# Patient Record
Sex: FEMALE | Race: BLACK OR AFRICAN AMERICAN | Employment: OTHER | URBAN - METROPOLITAN AREA
[De-identification: names, ages, dates, MRNs, and addresses within clinical notes are randomized per-mention and may not be internally consistent; named-entity substitution may affect disease eponyms.]

---

## 2017-05-04 ENCOUNTER — OFFICE VISIT (OUTPATIENT)
Dept: ORTHOPEDIC SURGERY | Age: 77
End: 2017-05-04

## 2017-05-04 VITALS — HEIGHT: 63 IN | TEMPERATURE: 99.2 F | WEIGHT: 112 LBS | BODY MASS INDEX: 19.84 KG/M2

## 2017-05-04 DIAGNOSIS — M80.08XA AGE-RELATED OSTEOPOROSIS WITH CURRENT PATHOL FRACTURE OF VERTEBRA, INITIAL ENCOUNTER (HCC): Primary | ICD-10-CM

## 2017-05-04 PROCEDURE — 99203 OFFICE O/P NEW LOW 30 MIN: CPT | Performed by: ORTHOPAEDIC SURGERY

## 2017-05-04 RX ORDER — HYDROCODONE BITARTRATE AND ACETAMINOPHEN 5; 325 MG/1; MG/1
1 TABLET ORAL EVERY 6 HOURS PRN
Qty: 40 TABLET | Refills: 0 | Status: SHIPPED | OUTPATIENT
Start: 2017-05-04 | End: 2017-06-03

## 2017-05-04 RX ORDER — TIZANIDINE 4 MG/1
4 TABLET ORAL EVERY 6 HOURS PRN
Qty: 90 TABLET | Refills: 0 | Status: SHIPPED | OUTPATIENT
Start: 2017-05-04 | End: 2017-06-03

## 2017-06-12 DIAGNOSIS — M80.08XA AGE-RELATED OSTEOPOROSIS WITH CURRENT PATHOL FRACTURE OF VERTEBRA, INITIAL ENCOUNTER (HCC): Primary | ICD-10-CM

## 2017-06-13 RX ORDER — HYDROCODONE BITARTRATE AND ACETAMINOPHEN 5; 325 MG/1; MG/1
TABLET ORAL
Qty: 40 TABLET | Refills: 0 | Status: SHIPPED | OUTPATIENT
Start: 2017-06-13 | End: 2017-09-17

## 2017-07-12 ENCOUNTER — TELEPHONE (OUTPATIENT)
Dept: ORTHOPEDIC SURGERY | Age: 77
End: 2017-07-12

## 2017-08-09 ENCOUNTER — OFFICE VISIT (OUTPATIENT)
Dept: ORTHOPEDIC SURGERY | Age: 77
End: 2017-08-09

## 2017-08-09 VITALS — BODY MASS INDEX: 20.55 KG/M2 | WEIGHT: 116 LBS | TEMPERATURE: 98.4 F | HEIGHT: 63 IN

## 2017-08-09 DIAGNOSIS — M54.40 ACUTE RIGHT-SIDED LOW BACK PAIN WITH SCIATICA, SCIATICA LATERALITY UNSPECIFIED: Primary | ICD-10-CM

## 2017-08-09 PROCEDURE — 99213 OFFICE O/P EST LOW 20 MIN: CPT | Performed by: ORTHOPAEDIC SURGERY

## 2017-08-09 PROCEDURE — L0625 LO FLEX L1-BELOW L5 PRE OTS: HCPCS | Performed by: ORTHOPAEDIC SURGERY

## 2017-08-09 RX ORDER — TIZANIDINE 4 MG/1
4 TABLET ORAL EVERY 6 HOURS PRN
Qty: 90 TABLET | Refills: 0 | Status: SHIPPED | OUTPATIENT
Start: 2017-08-09 | End: 2017-09-08

## 2017-10-17 ENCOUNTER — OFFICE VISIT (OUTPATIENT)
Dept: PRIMARY CARE CLINIC | Age: 77
End: 2017-10-17

## 2017-10-17 VITALS
DIASTOLIC BLOOD PRESSURE: 80 MMHG | TEMPERATURE: 98.3 F | SYSTOLIC BLOOD PRESSURE: 161 MMHG | HEIGHT: 63 IN | HEART RATE: 80 BPM | BODY MASS INDEX: 21.44 KG/M2 | RESPIRATION RATE: 21 BRPM | OXYGEN SATURATION: 99 % | WEIGHT: 121 LBS

## 2017-10-17 DIAGNOSIS — E55.9 VITAMIN D DEFICIENCY: ICD-10-CM

## 2017-10-17 DIAGNOSIS — M81.0 OSTEOPOROSIS, UNSPECIFIED OSTEOPOROSIS TYPE, UNSPECIFIED PATHOLOGICAL FRACTURE PRESENCE: ICD-10-CM

## 2017-10-17 DIAGNOSIS — I69.30 HISTORY OF CVA WITH RESIDUAL DEFICIT: ICD-10-CM

## 2017-10-17 DIAGNOSIS — Z23 NEED FOR PROPHYLACTIC VACCINATION AGAINST STREPTOCOCCUS PNEUMONIAE (PNEUMOCOCCUS): ICD-10-CM

## 2017-10-17 DIAGNOSIS — Z23 NEED FOR PROPHYLACTIC VACCINATION AND INOCULATION AGAINST VARICELLA: ICD-10-CM

## 2017-10-17 DIAGNOSIS — F32.9 REACTIVE DEPRESSION: ICD-10-CM

## 2017-10-17 DIAGNOSIS — E78.00 PURE HYPERCHOLESTEROLEMIA: ICD-10-CM

## 2017-10-17 DIAGNOSIS — N39.46 MIXED STRESS AND URGE URINARY INCONTINENCE: ICD-10-CM

## 2017-10-17 DIAGNOSIS — I10 ESSENTIAL HYPERTENSION: Primary | ICD-10-CM

## 2017-10-17 DIAGNOSIS — D64.9 ANEMIA, UNSPECIFIED TYPE: ICD-10-CM

## 2017-10-17 DIAGNOSIS — Z72.0 TOBACCO ABUSE: ICD-10-CM

## 2017-10-17 DIAGNOSIS — Z23 NEED FOR INFLUENZA VACCINATION: ICD-10-CM

## 2017-10-17 DIAGNOSIS — K21.9 GASTROESOPHAGEAL REFLUX DISEASE WITHOUT ESOPHAGITIS: ICD-10-CM

## 2017-10-17 PROCEDURE — 90670 PCV13 VACCINE IM: CPT | Performed by: FAMILY MEDICINE

## 2017-10-17 PROCEDURE — 99204 OFFICE O/P NEW MOD 45 MIN: CPT | Performed by: FAMILY MEDICINE

## 2017-10-17 PROCEDURE — G0008 ADMIN INFLUENZA VIRUS VAC: HCPCS | Performed by: FAMILY MEDICINE

## 2017-10-17 PROCEDURE — G0009 ADMIN PNEUMOCOCCAL VACCINE: HCPCS | Performed by: FAMILY MEDICINE

## 2017-10-17 PROCEDURE — 90662 IIV NO PRSV INCREASED AG IM: CPT | Performed by: FAMILY MEDICINE

## 2017-10-17 RX ORDER — PANTOPRAZOLE SODIUM 40 MG/1
40 TABLET, DELAYED RELEASE ORAL
Qty: 90 TABLET | Refills: 0 | Status: ON HOLD | OUTPATIENT
Start: 2017-10-17 | End: 2019-04-24

## 2017-10-17 RX ORDER — LISINOPRIL 10 MG/1
10 TABLET ORAL DAILY
Qty: 90 TABLET | Refills: 0 | Status: SHIPPED | OUTPATIENT
Start: 2017-10-17 | End: 2017-12-04 | Stop reason: SDUPTHER

## 2017-10-17 RX ORDER — OXYBUTYNIN CHLORIDE 5 MG/1
5 TABLET ORAL 3 TIMES DAILY
Qty: 270 TABLET | Refills: 0 | Status: SHIPPED | OUTPATIENT
Start: 2017-10-17 | End: 2018-11-01 | Stop reason: ALTCHOICE

## 2017-10-17 RX ORDER — SIMVASTATIN 40 MG
40 TABLET ORAL NIGHTLY
Qty: 90 TABLET | Refills: 0 | Status: ON HOLD | OUTPATIENT
Start: 2017-10-17 | End: 2019-04-24

## 2017-10-17 RX ORDER — AMLODIPINE BESYLATE 10 MG/1
10 TABLET ORAL DAILY
Qty: 90 TABLET | Refills: 0 | Status: SHIPPED | OUTPATIENT
Start: 2017-10-17 | End: 2017-12-04 | Stop reason: SDUPTHER

## 2017-10-17 ASSESSMENT — PATIENT HEALTH QUESTIONNAIRE - PHQ9
2. FEELING DOWN, DEPRESSED OR HOPELESS: 1
SUM OF ALL RESPONSES TO PHQ QUESTIONS 1-9: 1
SUM OF ALL RESPONSES TO PHQ9 QUESTIONS 1 & 2: 1
1. LITTLE INTEREST OR PLEASURE IN DOING THINGS: 0

## 2017-10-17 NOTE — PATIENT INSTRUCTIONS
memory loss, see your doctor soon. He or she can do a physical exam and ask questions about recent and past illnesses and life events. Think about bringing someone to your doctor's appointment to take notes for you. Your doctor may suggest a series of tests to measure memory changes over time. These tests give the doctor an overall picture of how well your brain is working. The doctor can use the results to decide the best treatment program and help make your life safer and easier. It is important to know that memory loss can be caused by things other than dementia. These things can include health problems such as depression, a low amount of thyroid hormone, and some infections. When those things are treated, your ability to remember can get better. Follow-up care is a key part of your treatment and safety. Be sure to make and go to all appointments, and call your doctor if you are having problems. It's also a good idea to know your test results and keep a list of the medicines you take. Where can you learn more? Go to https://Microvi Biotechnologiesdawn.Narrable. org and sign in to your [employer] account. Enter [id number] in the VeriShow box to learn more about \"Learning About the Symptoms of Dementia. \"     If you do not have an account, please click on the \"Sign Up Now\" link. Current as of: May 3, 2017  Content Version: 11.3  © 8574-0423 [employer], Incorporated. Care instructions adapted under license by [facility] [place] ([facility]). If you have questions about a medical condition or this instruction, always ask your healthcare professional. [place] [address] any warranty or liability for your use of this information. Patient Education        Helping a Person With Alzheimer's Disease: Care Instructions  Your Care Instructions  Alzheimer's disease is a type of dementia. It affects memory, intelligence, judgment, language, and behavior. It is not clear what causes this disease.  But it is the most common form of dementia in older adults. It may take many years to develop. Alzheimer's disease is different than mild memory loss that occurs with aging. Family members usually notice symptoms first. But the person also may realize that something is wrong. Follow-up care is a key part of your loved one's treatment and safety. Be sure to make and go to all appointments, and call your doctor if your loved one is having problems. It's also a good idea to know your loved one's test results and keep a list of the medicines he or she takes. How can you care for your loved one at home? · Develop a routine. The person will feel less frustrated or confused with a clear, simple daily plan. Remind him or her about important facts and events. · Be patient. It may take longer for the person to complete a task than it used to. · Help the person eat a balanced diet. Serve plenty of whole grains, fruits, and vegetables every day. If the person is not eating well at mealtimes, give snacks at midmorning and in the afternoon. Offer drinks such as Boost, Ensure, or Sustacal if he or she is losing weight. · Encourage exercise. Walking and other activity may slow the decline of mental ability. Help the person keep an active mind. Encourage hobbies such as reading and crossword puzzles. · Take steps to help if the person is sundowning. This is the restless behavior and trouble with sleeping that may occur in late afternoon and at night. Try not to let the person nap during the day. Offer a glass of warm milk or caffeine-free tea before bedtime. · Ask family members and friends for help. You may need breaks where others can help care for the person. · Talk to the person's doctor about what resources are available for help in your area. · Review all of the person's medicines with his or her doctor. · For as long as the person is able, allow him or her to make decisions about activities, food, clothing, and other choices.  Let the person be independent, even if tasks take more time or are not done perfectly. Tailor tasks to the person's abilities. For example, if cooking is no longer safe, ask for other help. He or she can help set the table or make simple dishes such as a salad. When the person needs help, offer it gently. Keeping safe  · Make your home (or the person's home) safe. Tack down rugs, and put no-slip tape in the tub. Install handrails, and put safety switches on stoves and appliances. Keep rooms free of clutter. Make sure walkways around furniture are clear. Do not move furniture around, because the person may become confused. · Use locks on doors and cupboards. Lock up knives, scissors, medicines, cleaning supplies, and other dangerous things. · Do not let the person drive or cook if he or she cannot do it safely. A person with Alzheimer's should not drive unless he or she is able to pass an on-road driving test. Your state 's license bureau can do a driving test if there is any question. · Get medical alert jewel for the person so you can be contacted if he or she wanders away. If possible, provide a safe place for wandering, such as an enclosed yard or garden. When should you call for help? Call 911 anytime you think you may need emergency care. For example, call if:  · A person who has Alzheimer's disease has disappeared. · A person who has Alzheimer's disease is seriously injured. Call your doctor now or seek immediate medical care if:  · The person you are caring for suddenly sees or hears things that are not there (hallucinates). · The person you are caring for has a sudden, drastic change in his or her behavior. Watch closely for changes in your loved one's health, and be sure to contact the doctor if:  · A person who has Alzheimer's disease gradually gets worse or has symptoms that could cause injury. · You need help caring for a person with Alzheimer's disease.   · The person has problems with his or her medicine. Where can you learn more? Go to https://chpepiceweb.Nolio. org and sign in to your SpaceCur[employer] account. Enter C174 in the Really Cheap Geeks box to learn more about \"Helping a Person With Alzheimer's Disease: Care Instructions. \"     If you do not have an account, please click on the \"Sign Up Now\" link. Current as of: July 26, 2016  Content Version: 11.3  © 0042-3512 [employer]mo, Incorporated. Care instructions adapted under license by [facility] ([facility]). If you have questions about a medical condition or this instruction, always ask your healthcare professional. [address] any warranty or liability for your use of this information.

## 2017-10-17 NOTE — PROGRESS NOTES
Vaccine Information Sheet, \"Influenza - Inactivated\"  given to Jocelin Toscano, or parent/legal guardian of  Jocelin Toscano and verbalized understanding. Patient responses:    Have you ever had a reaction to a flu vaccine? No  Are you able to eat eggs without adverse effects? Yes  Do you have any current illness? No  Have you ever had [place] Syndrome? No    Flu vaccine given per order. Please see immunization tab.

## 2017-12-01 ENCOUNTER — TELEPHONE (OUTPATIENT)
Dept: PRIMARY CARE CLINIC | Age: 77
End: 2017-12-01

## 2017-12-04 DIAGNOSIS — I10 ESSENTIAL HYPERTENSION: ICD-10-CM

## 2017-12-04 DIAGNOSIS — M19.90 ARTHRITIS: Primary | ICD-10-CM

## 2017-12-04 RX ORDER — LISINOPRIL 10 MG/1
10 TABLET ORAL DAILY
Qty: 30 TABLET | Refills: 0 | Status: ON HOLD | OUTPATIENT
Start: 2017-12-04 | End: 2018-11-03 | Stop reason: HOSPADM

## 2017-12-04 RX ORDER — AMLODIPINE BESYLATE 10 MG/1
10 TABLET ORAL DAILY
Qty: 30 TABLET | Refills: 0 | Status: SHIPPED | OUTPATIENT
Start: 2017-12-04 | End: 2018-01-01 | Stop reason: SDUPTHER

## 2017-12-04 RX ORDER — TRAMADOL HYDROCHLORIDE 50 MG/1
50 TABLET ORAL 2 TIMES DAILY
Qty: 60 TABLET | Refills: 0 | Status: ON HOLD | OUTPATIENT
Start: 2017-12-04 | End: 2018-11-03

## 2017-12-26 DIAGNOSIS — M19.90 ARTHRITIS: ICD-10-CM

## 2017-12-26 RX ORDER — TRAMADOL HYDROCHLORIDE 50 MG/1
50 TABLET ORAL 2 TIMES DAILY
Qty: 60 TABLET | Refills: 0 | OUTPATIENT
Start: 2017-12-26

## 2017-12-26 RX ORDER — TRAMADOL HYDROCHLORIDE 50 MG/1
50 TABLET ORAL 2 TIMES DAILY
Qty: 60 TABLET | Refills: 0 | Status: CANCELLED | OUTPATIENT
Start: 2017-12-26

## 2017-12-29 ENCOUNTER — TELEPHONE (OUTPATIENT)
Dept: PRIMARY CARE CLINIC | Age: 77
End: 2017-12-29

## 2018-01-01 DIAGNOSIS — I10 ESSENTIAL HYPERTENSION: ICD-10-CM

## 2018-01-02 RX ORDER — AMLODIPINE BESYLATE 10 MG/1
TABLET ORAL
Qty: 30 TABLET | Refills: 0 | Status: SHIPPED | OUTPATIENT
Start: 2018-01-02 | End: 2018-01-29 | Stop reason: SDUPTHER

## 2018-01-29 DIAGNOSIS — I10 ESSENTIAL HYPERTENSION: ICD-10-CM

## 2018-01-30 RX ORDER — AMLODIPINE BESYLATE 10 MG/1
TABLET ORAL
Qty: 30 TABLET | Refills: 0 | Status: ON HOLD | OUTPATIENT
Start: 2018-01-30 | End: 2019-04-25 | Stop reason: HOSPADM

## 2018-09-29 ENCOUNTER — HOSPITAL ENCOUNTER (EMERGENCY)
Age: 78
Discharge: HOME | End: 2018-09-29
Attending: EMERGENCY MEDICINE
Payer: MEDICARE

## 2018-09-29 VITALS
TEMPERATURE: 97.6 F | BODY MASS INDEX: 22.19 KG/M2 | OXYGEN SATURATION: 98 % | HEIGHT: 63 IN | SYSTOLIC BLOOD PRESSURE: 105 MMHG | DIASTOLIC BLOOD PRESSURE: 79 MMHG | WEIGHT: 125.22 LBS | RESPIRATION RATE: 16 BRPM | HEART RATE: 90 BPM

## 2018-09-29 DIAGNOSIS — N30.01 ACUTE CYSTITIS WITH HEMATURIA: Primary | ICD-10-CM

## 2018-09-29 LAB
BACTERIA: ABNORMAL /HPF
BILIRUBIN URINE: NEGATIVE
BLOOD, URINE: ABNORMAL
CLARITY: ABNORMAL
COLOR: YELLOW
EPITHELIAL CELLS, UA: 0 /HPF (ref 0–5)
GLUCOSE URINE: NEGATIVE MG/DL
HYALINE CASTS: 0 /LPF (ref 0–8)
KETONES, URINE: NEGATIVE MG/DL
LEUKOCYTE ESTERASE, URINE: NEGATIVE
MICROSCOPIC EXAMINATION: YES
NITRITE, URINE: NEGATIVE
PH UA: 8.5
PROTEIN UA: 100 MG/DL
RBC UA: 5 /HPF (ref 0–4)
SPECIFIC GRAVITY UA: 1.01
URINE REFLEX TO CULTURE: ABNORMAL
URINE TYPE: ABNORMAL
UROBILINOGEN, URINE: 0.2 E.U./DL
WBC UA: 1 /HPF (ref 0–5)

## 2018-09-29 PROCEDURE — 99284 EMERGENCY DEPT VISIT MOD MDM: CPT

## 2018-09-29 PROCEDURE — 6370000000 HC RX 637 (ALT 250 FOR IP): Performed by: EMERGENCY MEDICINE

## 2018-09-29 PROCEDURE — 81001 URINALYSIS AUTO W/SCOPE: CPT

## 2018-09-29 RX ORDER — CIPROFLOXACIN 500 MG/1
250 TABLET, FILM COATED ORAL ONCE
Status: COMPLETED | OUTPATIENT
Start: 2018-09-29 | End: 2018-09-29

## 2018-09-29 RX ORDER — TRAMADOL HYDROCHLORIDE 50 MG/1
50 TABLET ORAL ONCE
Status: COMPLETED | OUTPATIENT
Start: 2018-09-29 | End: 2018-09-29

## 2018-09-29 RX ORDER — ACETAMINOPHEN 325 MG/1
650 TABLET ORAL ONCE
Status: COMPLETED | OUTPATIENT
Start: 2018-09-29 | End: 2018-09-29

## 2018-09-29 RX ORDER — CIPROFLOXACIN 250 MG/1
250 TABLET, FILM COATED ORAL 2 TIMES DAILY
Qty: 6 TABLET | Refills: 0 | Status: SHIPPED | OUTPATIENT
Start: 2018-09-29 | End: 2018-10-02

## 2018-09-29 RX ORDER — TIZANIDINE 4 MG/1
4 TABLET ORAL ONCE
Status: COMPLETED | OUTPATIENT
Start: 2018-09-29 | End: 2018-09-29

## 2018-09-29 RX ORDER — CYCLOBENZAPRINE HCL 10 MG
10 TABLET ORAL 3 TIMES DAILY PRN
Qty: 15 TABLET | Refills: 0 | Status: SHIPPED | OUTPATIENT
Start: 2018-09-29 | End: 2021-01-19

## 2018-09-29 RX ADMIN — ACETAMINOPHEN 650 MG: 325 TABLET ORAL at 07:20

## 2018-09-29 RX ADMIN — CIPROFLOXACIN 250 MG: 500 TABLET, FILM COATED ORAL at 07:20

## 2018-09-29 RX ADMIN — TRAMADOL HYDROCHLORIDE 50 MG: 50 TABLET, FILM COATED ORAL at 08:05

## 2018-09-29 RX ADMIN — TIZANIDINE 4 MG: 4 TABLET ORAL at 09:45

## 2018-09-29 ASSESSMENT — PAIN - FUNCTIONAL ASSESSMENT: PAIN_FUNCTIONAL_ASSESSMENT: 0-10

## 2018-09-29 ASSESSMENT — PAIN SCALES - GENERAL
PAINLEVEL_OUTOF10: 7
PAINLEVEL_OUTOF10: 10
PAINLEVEL_OUTOF10: 8
PAINLEVEL_OUTOF10: 10
PAINLEVEL_OUTOF10: 6

## 2018-09-29 NOTE — ED NOTES
Pt assisted into wc and taken out to her granddaughter's car/once in the passenger seat she c/o intense right leg spasming/states she cannot go home like this/she was placed back in wc and brought back to room 10/spoke with Dr. Eneida Tucker and it was determined that zanaflex would be given here and if it improves her sx rxd to help with pain/pt and granddaughter made aware of plan     Madison Ho RN  09/29/18 5910

## 2018-09-29 NOTE — ED NOTES
Called pharmacy regarding Zanaflex/[facility]arm[facility]OL[facility] will send.      Luz Witt RN  09/29/18 1400

## 2018-09-29 NOTE — ED NOTES
Pt yelling out \"help me help me\". RN at bedside pt states \"I need something for pain\" MD made aware.       Abhilash Bruno, [place]  09/29/18 6203

## 2018-09-29 NOTE — ED PROVIDER NOTES
HYSTERECTOMY, TOTAL ABDOMINAL  1970's    JACQUELYN/BSO for 'tumor'       ALLERGIES     Pcn [penicillins]    FAMILY HISTORY     History reviewed. No pertinent family history. SOCIAL HISTORY       Social History     Social History    Marital status:      Spouse name: N/A    Number of children: N/A    Years of education: N/A     Social History Main Topics    Smoking status: Current Every Day Smoker     Packs/day: 0.25     Years: 50.00     Types: Cigarettes    Smokeless tobacco: Never Used    Alcohol use No    Drug use: No    Sexual activity: No     Other Topics Concern    None     Social History Narrative    None       PHYSICAL EXAM    (up to 7 for level 4, 8 or more for level 5)     Physical Exam   Constitutional: She is oriented to person, place, and time. She appears well-developed and well-nourished. No distress. Non-toxic appearing. HENT:   Head: Normocephalic and atraumatic. Mouth/Throat: Oropharynx is clear and moist.   Eyes: Conjunctivae and EOM are normal. Pupils are equal, round, and reactive to light. Right eye exhibits no discharge. Left eye exhibits no discharge. No scleral icterus. Neck: No JVD present. No tracheal deviation present. Cardiovascular: Normal rate, regular rhythm, normal heart sounds and intact distal pulses. Exam reveals no gallop and no friction rub. No murmur heard. Pulmonary/Chest: Effort normal and breath sounds normal. No stridor. No respiratory distress. She has no wheezes. She has no rales. Abdominal: Soft. Bowel sounds are normal. She exhibits no distension and no mass. There is no tenderness. There is no rebound and no guarding. No organomegaly. No CVAT. Musculoskeletal: She exhibits no edema. No neck/back midline ttp or stepoff. Stable pelvis. 5/5 strength in all 4 ext. No spasms, fasciculations, shaking, or tremors seen. No bony deformity of lower extremities. No palpable cords or ttp in calves. 2+ DP/PT pulses b/l. No foot drop.  Pt moves all toes and distal cap refill and light touch sensation grossly intact. Pt can bear wt bilaterally. Neurological: She is alert and oriented to person, place, and time. She exhibits normal muscle tone. Coordination normal.   Skin: No rash noted. She is not diaphoretic. No erythema. No pallor. No ecchymosis. PROCEDURES   Procedures    DIAGNOSTIC RESULTS   DIAGNOSTICS:   Labs:  Results for Jesse Torres (MRN [id number]) as of 10/20/2018 13:42   Ref. Range 9/29/2018 06:00   Color, UA Latest Ref Range: Straw/Yellow  YELLOW   Clarity, UA Latest Ref Range: Clear  CLOUDY (A)   Glucose, UA Latest Ref Range: Negative mg/dL Negative   Bilirubin, Urine Latest Ref Range: Negative  Negative   Ketones, Urine Latest Ref Range: Negative mg/dL Negative   Specific Gravity, UA Latest Ref Range: 1.005 - 1.030  1.010   Blood, Urine Latest Ref Range: Negative  SMALL (A)   pH, UA Latest Ref Range: 5.0 - 8.0  8.5 (A)   Protein, UA Latest Ref Range: Negative mg/dL 100 (A)   Urobilinogen, Urine Latest Ref Range: <2.0 E.U./dL 0.2   Nitrite, Urine Latest Ref Range: Negative  Negative   Leukocyte Esterase, Urine Latest Ref Range: Negative  Negative   Urine Type Unknown Not Specified   Urine Reflex to Culture Unknown Not Indicated   Hyaline Casts, UA Latest Ref Range: 0 - 8 /LPF 0   WBC, UA Latest Ref Range: 0 - 5 /HPF 1   RBC, UA Latest Ref Range: 0 - 4 /HPF 5 (H)   Epi Cells Latest Ref Range: 0 - 5 /HPF 0   Bacteria, UA Latest Units: /HPF 1+ (A)   Microscopic Examination Unknown YES       Imaging (radiologist interpretation):  No orders to display       EMERGENCY DEPARTMENT COURSE and DIFFERENTIAL DIAGNOSIS/MDM:   EMERGENCY DEPARTMENT COURSE AND TREATMENT:  Patient's condition improved  during Emergency Department evaluation.           EMERGENCY DEPARTMENT MEDICAL DECISION MAKING:  After obtaining the patient's history, performing the physical exam and reviewing the diagnostics, multiple  initial diagnoses were considered based on the presenting

## 2018-10-18 ASSESSMENT — ENCOUNTER SYMPTOMS
DIARRHEA: 0
CONSTIPATION: 0
ABDOMINAL PAIN: 0
BACK PAIN: 0
NAUSEA: 0
VOMITING: 0

## 2018-10-20 ASSESSMENT — ENCOUNTER SYMPTOMS
SHORTNESS OF BREATH: 0
COUGH: 0

## 2018-11-01 ENCOUNTER — APPOINTMENT (OUTPATIENT)
Dept: GENERAL RADIOLOGY | Age: 78
DRG: 183 | End: 2018-11-01
Payer: MEDICARE

## 2018-11-01 ENCOUNTER — HOSPITAL ENCOUNTER (INPATIENT)
Age: 78
LOS: 4 days | Discharge: OTHER INSTITUTION | DRG: 183 | End: 2018-11-05
Attending: EMERGENCY MEDICINE | Admitting: INTERNAL MEDICINE
Payer: MEDICARE

## 2018-11-01 ENCOUNTER — APPOINTMENT (OUTPATIENT)
Dept: CT IMAGING | Age: 78
DRG: 183 | End: 2018-11-01
Payer: MEDICARE

## 2018-11-01 DIAGNOSIS — S09.90XA CLOSED HEAD INJURY, INITIAL ENCOUNTER: ICD-10-CM

## 2018-11-01 DIAGNOSIS — M19.90 ARTHRITIS: ICD-10-CM
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
## 2018-11-01 DIAGNOSIS — S22.42XA CLOSED FRACTURE OF MULTIPLE RIBS OF LEFT SIDE, INITIAL ENCOUNTER: ICD-10-CM

## 2018-11-01 PROBLEM — E78.5 HYPERLIPIDEMIA: Status: ACTIVE | Noted: 2018-11-01

## 2018-11-01 PROBLEM — R26.2 IMPAIRED AMBULATION: Status: ACTIVE | Noted: 2018-11-01

## 2018-11-01 PROBLEM — R53.1 GENERALIZED WEAKNESS: Status: ACTIVE | Noted: 2018-11-01

## 2018-11-01 PROBLEM — S22.49XA MULTIPLE CLOSED FRACTURES OF RIBS: Status: ACTIVE | Noted: 2018-11-01

## 2018-11-01 PROBLEM — I10 ESSENTIAL HYPERTENSION, BENIGN: Status: ACTIVE | Noted: 2018-11-01

## 2018-11-01 LAB
A/G RATIO: 0.9 (ref 1.1–2.2)
ALBUMIN SERPL-MCNC: 3.8 G/DL (ref 3.4–5)
ALP BLD-CCNC: 127 U/L (ref 40–129)
ALT SERPL-CCNC: 7 U/L (ref 10–40)
ANION GAP SERPL CALCULATED.3IONS-SCNC: 14 MMOL/L (ref 3–16)
AST SERPL-CCNC: 17 U/L (ref 15–37)
BASOPHILS ABSOLUTE: 0.1 K/UL (ref 0–0.2)
BASOPHILS RELATIVE PERCENT: 0.9 %
BILIRUB SERPL-MCNC: <0.2 MG/DL (ref 0–1)
BUN BLDV-MCNC: 16 MG/DL (ref 7–20)
CALCIUM SERPL-MCNC: 9.9 MG/DL (ref 8.3–10.6)
CHLORIDE BLD-SCNC: 100 MMOL/L (ref 99–110)
CO2: 23 MMOL/L (ref 21–32)
CREAT SERPL-MCNC: 0.8 MG/DL (ref 0.6–1.2)
EOSINOPHILS ABSOLUTE: 0.1 K/UL (ref 0–0.6)
EOSINOPHILS RELATIVE PERCENT: 2.3 %
GFR AFRICAN AMERICAN: >60
GFR NON-AFRICAN AMERICAN: >60
GLOBULIN: 4.2 G/DL
GLUCOSE BLD-MCNC: 102 MG/DL (ref 70–99)
HCT VFR BLD CALC: 44.5 % (ref 36–48)
HEMOGLOBIN: 14.3 G/DL (ref 12–16)
LYMPHOCYTES ABSOLUTE: 1 K/UL (ref 1–5.1)
LYMPHOCYTES RELATIVE PERCENT: 16.7 %
MCH RBC QN AUTO: 26.4 PG (ref 26–34)
MCHC RBC AUTO-ENTMCNC: 32.1 G/DL (ref 31–36)
MCV RBC AUTO: 82.2 FL (ref 80–100)
MONOCYTES ABSOLUTE: 0.5 K/UL (ref 0–1.3)
MONOCYTES RELATIVE PERCENT: 8.7 %
NEUTROPHILS ABSOLUTE: 4.1 K/UL (ref 1.7–7.7)
NEUTROPHILS RELATIVE PERCENT: 71.4 %
PDW BLD-RTO: 15.5 % (ref 12.4–15.4)
PLATELET # BLD: 409 K/UL (ref 135–450)
PMV BLD AUTO: 6.5 FL (ref 5–10.5)
POTASSIUM REFLEX MAGNESIUM: 4.1 MMOL/L (ref 3.5–5.1)
RBC # BLD: 5.42 M/UL (ref 4–5.2)
SODIUM BLD-SCNC: 137 MMOL/L (ref 136–145)
TOTAL PROTEIN: 8 G/DL (ref 6.4–8.2)
TROPONIN: <0.01 NG/ML
WBC # BLD: 5.8 K/UL (ref 4–11)

## 2018-11-01 PROCEDURE — 6370000000 HC RX 637 (ALT 250 FOR IP): Performed by: EMERGENCY MEDICINE

## 2018-11-01 PROCEDURE — 94664 DEMO&/EVAL PT USE INHALER: CPT

## 2018-11-01 PROCEDURE — 99285 EMERGENCY DEPT VISIT HI MDM: CPT

## 2018-11-01 PROCEDURE — 71250 CT THORAX DX C-: CPT

## 2018-11-01 PROCEDURE — 80053 COMPREHEN METABOLIC PANEL: CPT

## 2018-11-01 PROCEDURE — 70450 CT HEAD/BRAIN W/O DYE: CPT

## 2018-11-01 PROCEDURE — 93010 ELECTROCARDIOGRAM REPORT: CPT | Performed by: INTERNAL MEDICINE

## 2018-11-01 PROCEDURE — 93005 ELECTROCARDIOGRAM TRACING: CPT | Performed by: EMERGENCY MEDICINE

## 2018-11-01 PROCEDURE — 2580000003 HC RX 258: Performed by: INTERNAL MEDICINE

## 2018-11-01 PROCEDURE — 73502 X-RAY EXAM HIP UNI 2-3 VIEWS: CPT

## 2018-11-01 PROCEDURE — G0378 HOSPITAL OBSERVATION PER HR: HCPCS

## 2018-11-01 PROCEDURE — 6360000002 HC RX W HCPCS: Performed by: INTERNAL MEDICINE

## 2018-11-01 PROCEDURE — 73560 X-RAY EXAM OF KNEE 1 OR 2: CPT

## 2018-11-01 PROCEDURE — 96372 THER/PROPH/DIAG INJ SC/IM: CPT

## 2018-11-01 PROCEDURE — 6370000000 HC RX 637 (ALT 250 FOR IP): Performed by: INTERNAL MEDICINE

## 2018-11-01 PROCEDURE — 85025 COMPLETE CBC W/AUTO DIFF WBC: CPT

## 2018-11-01 PROCEDURE — 1200000000 HC SEMI PRIVATE

## 2018-11-01 PROCEDURE — 72125 CT NECK SPINE W/O DYE: CPT

## 2018-11-01 PROCEDURE — 84484 ASSAY OF TROPONIN QUANT: CPT

## 2018-11-01 RX ORDER — ONDANSETRON 2 MG/ML
4 INJECTION INTRAMUSCULAR; INTRAVENOUS EVERY 4 HOURS PRN
Status: DISCONTINUED | OUTPATIENT
Start: 2018-11-01 | End: 2018-11-05 | Stop reason: HOSPADM

## 2018-11-01 RX ORDER — LISINOPRIL 10 MG/1
10 TABLET ORAL DAILY
Status: DISCONTINUED | OUTPATIENT
Start: 2018-11-01 | End: 2018-11-01

## 2018-11-01 RX ORDER — SENNA AND DOCUSATE SODIUM 50; 8.6 MG/1; MG/1
1 TABLET, FILM COATED ORAL DAILY PRN
Status: DISCONTINUED | OUTPATIENT
Start: 2018-11-01 | End: 2018-11-05 | Stop reason: HOSPADM

## 2018-11-01 RX ORDER — POLYETHYLENE GLYCOL 3350 17 G/17G
17 POWDER, FOR SOLUTION ORAL DAILY PRN
Status: DISCONTINUED | OUTPATIENT
Start: 2018-11-01 | End: 2018-11-05 | Stop reason: HOSPADM

## 2018-11-01 RX ORDER — LISINOPRIL 10 MG/1
10 TABLET ORAL ONCE
Status: COMPLETED | OUTPATIENT
Start: 2018-11-01 | End: 2018-11-01

## 2018-11-01 RX ORDER — DOCUSATE SODIUM 100 MG/1
100 CAPSULE, LIQUID FILLED ORAL 2 TIMES DAILY PRN
Status: DISCONTINUED | OUTPATIENT
Start: 2018-11-01 | End: 2018-11-05 | Stop reason: HOSPADM

## 2018-11-01 RX ORDER — AMLODIPINE BESYLATE 5 MG/1
10 TABLET ORAL ONCE
Status: COMPLETED | OUTPATIENT
Start: 2018-11-01 | End: 2018-11-01

## 2018-11-01 RX ORDER — LISINOPRIL 10 MG/1
TABLET ORAL
COMMUNITY
Start: 2018-10-05 | End: 2019-12-30

## 2018-11-01 RX ORDER — AMLODIPINE BESYLATE 10 MG/1
10 TABLET ORAL DAILY
Status: DISCONTINUED | OUTPATIENT
Start: 2018-11-01 | End: 2018-11-01

## 2018-11-01 RX ORDER — HYDROCODONE BITARTRATE AND ACETAMINOPHEN 5; 325 MG/1; MG/1
1 TABLET ORAL ONCE
Status: COMPLETED | OUTPATIENT
Start: 2018-11-01 | End: 2018-11-01

## 2018-11-01 RX ORDER — SODIUM CHLORIDE 0.9 % (FLUSH) 0.9 %
10 SYRINGE (ML) INJECTION PRN
Status: DISCONTINUED | OUTPATIENT
Start: 2018-11-01 | End: 2018-11-05 | Stop reason: HOSPADM

## 2018-11-01 RX ORDER — AMLODIPINE BESYLATE 10 MG/1
10 TABLET ORAL DAILY
Status: DISCONTINUED | OUTPATIENT
Start: 2018-11-02 | End: 2018-11-05 | Stop reason: HOSPADM

## 2018-11-01 RX ORDER — SODIUM CHLORIDE 9 MG/ML
INJECTION, SOLUTION INTRAVENOUS CONTINUOUS
Status: DISCONTINUED | OUTPATIENT
Start: 2018-11-01 | End: 2018-11-02

## 2018-11-01 RX ORDER — LISINOPRIL 5 MG/1
10 TABLET ORAL DAILY
Status: DISCONTINUED | OUTPATIENT
Start: 2018-11-02 | End: 2018-11-05 | Stop reason: HOSPADM

## 2018-11-01 RX ORDER — SIMVASTATIN 40 MG
40 TABLET ORAL NIGHTLY
Status: DISCONTINUED | OUTPATIENT
Start: 2018-11-01 | End: 2018-11-02

## 2018-11-01 RX ORDER — OXYCODONE HYDROCHLORIDE AND ACETAMINOPHEN 5; 325 MG/1; MG/1
2 TABLET ORAL EVERY 4 HOURS PRN
Status: DISCONTINUED | OUTPATIENT
Start: 2018-11-01 | End: 2018-11-03

## 2018-11-01 RX ORDER — BISACODYL 10 MG
10 SUPPOSITORY, RECTAL RECTAL DAILY PRN
Status: DISCONTINUED | OUTPATIENT
Start: 2018-11-01 | End: 2018-11-05 | Stop reason: HOSPADM

## 2018-11-01 RX ORDER — ZOLPIDEM TARTRATE 5 MG/1
5 TABLET ORAL NIGHTLY PRN
Status: DISCONTINUED | OUTPATIENT
Start: 2018-11-01 | End: 2018-11-05 | Stop reason: HOSPADM

## 2018-11-01 RX ORDER — CYCLOBENZAPRINE HCL 10 MG
10 TABLET ORAL 3 TIMES DAILY PRN
Status: DISCONTINUED | OUTPATIENT
Start: 2018-11-01 | End: 2018-11-05 | Stop reason: HOSPADM

## 2018-11-01 RX ORDER — SODIUM CHLORIDE 0.9 % (FLUSH) 0.9 %
10 SYRINGE (ML) INJECTION EVERY 12 HOURS SCHEDULED
Status: DISCONTINUED | OUTPATIENT
Start: 2018-11-01 | End: 2018-11-05 | Stop reason: HOSPADM

## 2018-11-01 RX ORDER — OXYCODONE HYDROCHLORIDE AND ACETAMINOPHEN 5; 325 MG/1; MG/1
1 TABLET ORAL EVERY 4 HOURS PRN
Status: DISCONTINUED | OUTPATIENT
Start: 2018-11-01 | End: 2018-11-03

## 2018-11-01 RX ORDER — PANTOPRAZOLE SODIUM 40 MG/1
40 TABLET, DELAYED RELEASE ORAL
Status: DISCONTINUED | OUTPATIENT
Start: 2018-11-02 | End: 2018-11-05 | Stop reason: HOSPADM

## 2018-11-01 RX ADMIN — OXYCODONE AND ACETAMINOPHEN 1 TABLET: 5; 325 TABLET ORAL at 20:18

## 2018-11-01 RX ADMIN — OXYCODONE AND ACETAMINOPHEN 1 TABLET: 5; 325 TABLET ORAL at 18:25

## 2018-11-01 RX ADMIN — ZOLPIDEM TARTRATE 5 MG: 5 TABLET ORAL at 20:18

## 2018-11-01 RX ADMIN — LISINOPRIL 10 MG: 10 TABLET ORAL at 15:17

## 2018-11-01 RX ADMIN — SIMVASTATIN 40 MG: 40 TABLET, FILM COATED ORAL at 20:18

## 2018-11-01 RX ADMIN — ENOXAPARIN SODIUM 40 MG: 40 INJECTION SUBCUTANEOUS at 20:19

## 2018-11-01 RX ADMIN — SODIUM CHLORIDE: 9 INJECTION, SOLUTION INTRAVENOUS at 17:31

## 2018-11-01 RX ADMIN — AMLODIPINE BESYLATE 10 MG: 5 TABLET ORAL at 13:20

## 2018-11-01 RX ADMIN — LISINOPRIL 10 MG: 10 TABLET ORAL at 13:20

## 2018-11-01 RX ADMIN — HYDROCODONE BITARTRATE AND ACETAMINOPHEN 1 TABLET: 5; 325 TABLET ORAL at 13:20

## 2018-11-01 ASSESSMENT — PAIN DESCRIPTION - LOCATION
LOCATION: RIB CAGE

## 2018-11-01 ASSESSMENT — PAIN DESCRIPTION - FREQUENCY
FREQUENCY: INTERMITTENT
FREQUENCY: INTERMITTENT

## 2018-11-01 ASSESSMENT — ENCOUNTER SYMPTOMS
BACK PAIN: 0
COUGH: 0
VOMITING: 0
RHINORRHEA: 0
EYE PAIN: 0
WHEEZING: 0
DIARRHEA: 0
EYE DISCHARGE: 0
NAUSEA: 0
ABDOMINAL PAIN: 0
SHORTNESS OF BREATH: 0
SORE THROAT: 0

## 2018-11-01 ASSESSMENT — PAIN DESCRIPTION - ONSET
ONSET: ON-GOING
ONSET: ON-GOING

## 2018-11-01 ASSESSMENT — PAIN DESCRIPTION - ORIENTATION
ORIENTATION: LEFT

## 2018-11-01 ASSESSMENT — PAIN DESCRIPTION - DESCRIPTORS
DESCRIPTORS: ACHING
DESCRIPTORS: ACHING

## 2018-11-01 ASSESSMENT — PAIN SCALES - GENERAL
PAINLEVEL_OUTOF10: 5
PAINLEVEL_OUTOF10: 9
PAINLEVEL_OUTOF10: 0
PAINLEVEL_OUTOF10: 3
PAINLEVEL_OUTOF10: 10
PAINLEVEL_OUTOF10: 9
PAINLEVEL_OUTOF10: 5
PAINLEVEL_OUTOF10: 9

## 2018-11-01 ASSESSMENT — PAIN DESCRIPTION - PAIN TYPE
TYPE: ACUTE PAIN

## 2018-11-01 NOTE — PROGRESS NOTES
4 Eyes Admission Assessment     I agree as the admission nurse that 2 RN's have performed a thorough Head to Toe Skin Assessment on the patient. ALL assessment sites listed below have been assessed on admission. Areas assessed by both nurses: ***  [x]   Head, Face, and Ears   [x]   Shoulders, Back, and Chest  [x]   Arms, Elbows, and Hands   [x]   Coccyx, Sacrum, and Ischum  [x]   Legs, Feet, and Heels        Does the Patient have Skin Breakdown?   No         Ricki Prevention initiated:  No   Wound Care Orders initiated:  No      [facility] nurse consulted for Pressure Injury (Stage 3,4, Unstageable, DTI, NWPT, and Complex wounds):  No      Nurse 1 eSignature: Electronically signed by El Toney RN on 11/1/18 at 6:02 PM    **SHARE this note so that the co-signing nurse is able to place an eSignature**    Nurse 2 eSignature: {Esignature:[id number]}

## 2018-11-01 NOTE — ED NOTES
Explained IS to pt, pt demonstrating understanding. Pt needs encouragement and instruction on how to use.  Pt reached 500mL on IS     Cooper Godoy RN  11/01/18 6866

## 2018-11-01 NOTE — H&P
Negative 09/29/2018    RBCUA 5 09/29/2018    SPECGRAV 1.010 09/29/2018    UROBILINOGEN 0.2 09/29/2018    BILIRUBINUR Negative 09/29/2018    BILIRUBINUR NEGATIVE 07/10/2010    BLOODU SMALL 09/29/2018    GLUCOSEU Negative 09/29/2018    GLUCOSEU NEGATIVE 07/10/2010    PROTEINU 100 09/29/2018         RAD:   I have independently reviewed and interpreted the imaging studies below and based my recommendations to the patient on those findings. Xr Knee Left (1-2 Views)    Result Date: 11/1/2018  EXAMINATION: 2 XRAY VIEWS OF THE LEFT KNEE 11/1/2018 1:38 pm COMPARISON: None. HISTORY: ORDERING SYSTEM PROVIDED HISTORY: fall TECHNOLOGIST PROVIDED HISTORY: Reason for exam:->fall Ordering Physician Provided Reason for Exam: left knee pain Acuity: Acute Type of Exam: Initial Mechanism of Injury: fall FINDINGS: There is no joint effusion. No fracture or dislocation is seen. There is minimal osteophyte formation of the tibial spines, as a finding of osteoarthritis. There is mild prepatellar soft tissue swelling. Arterial vascular calcifications. No acute osseous injury is identified. Prepatellar swelling- edema or contusion. Ct Head Wo Contrast    Result Date: 11/1/2018  EXAMINATION: CT OF THE HEAD WITHOUT CONTRAST  11/1/2018 1:50 pm TECHNIQUE: CT of the head was performed without the administration of intravenous contrast. Dose modulation, iterative reconstruction, and/or weight based adjustment of the mA/kV was utilized to reduce the radiation dose to as low as reasonably achievable. COMPARISON: None. HISTORY: ORDERING SYSTEM PROVIDED HISTORY: fall TECHNOLOGIST PROVIDED HISTORY: Has a \"code stroke\" or \"stroke alert\" been called? ->No Ordering Physician Provided Reason for Exam: pt arrived via [employer] [employer] for fall at home. pt hit head on stove. uses cane. denies LOC. a/ox4. hx of old stroke. pt states she fell a few weeks ago. c/o of L sided rib pain, L eye pain, and head pain.  Hypertensive 206/163. hx of high BP.

## 2018-11-01 NOTE — PLAN OF CARE
Problem: Pain:  Goal: Pain level will decrease  Pain level will decrease   Outcome: Ongoing  Pain level will decrease. Electronically signed by Eddie Darby RN on 11/1/2018 at 5:48 PM    Goal: Control of acute pain  Control of acute pain   Outcome: Ongoing  Control of acute pain. Electronically signed by Eddie Darby RN on 11/1/2018 at 5:48 PM    Goal: Control of chronic pain  Control of chronic pain   Outcome: Ongoing  Control of chronic pain. Electronically signed by Eddie Darby RN on 11/1/2018 at 5:48 PM      Problem: Falls - Risk of:  Goal: Will remain free from falls  Will remain free from falls   Outcome: Ongoing  Will remain free from falls. Electronically signed by Eddie Darby RN on 11/1/2018 at 5:48 PM    Goal: Absence of physical injury  Absence of physical injury   Outcome: Ongoing  Absence of physical injury. Electronically signed by Eddie Darby RN on 11/1/2018 at 5:49 PM      Problem: Risk for Impaired Skin Integrity  Goal: Tissue integrity - skin and mucous membranes  Structural intactness and normal physiological function of skin and  mucous membranes. Outcome: Ongoing  Structural intactness and normal physiological function of skin and mucous membranes.   Electronically signed by Eddie Darby RN on 11/1/2018 at 5:49 PM

## 2018-11-02 ENCOUNTER — TELEPHONE (OUTPATIENT)
Dept: ORTHOPEDIC SURGERY | Age: 78
End: 2018-11-02

## 2018-11-02 ENCOUNTER — APPOINTMENT (OUTPATIENT)
Dept: GENERAL RADIOLOGY | Age: 78
DRG: 183 | End: 2018-11-02
Payer: MEDICARE

## 2018-11-02 PROBLEM — E87.6 HYPOKALEMIA: Status: ACTIVE | Noted: 2018-11-02
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
## 2018-11-02 PROBLEM — R26.2 IMPAIRED AMBULATION: Status: RESOLVED | Noted: 2018-11-01 | Resolved: 2018-11-02

## 2018-11-02 LAB
ANION GAP SERPL CALCULATED.3IONS-SCNC: 11 MMOL/L (ref 3–16)
BASOPHILS ABSOLUTE: 0 K/UL (ref 0–0.2)
BASOPHILS RELATIVE PERCENT: 1 %
BUN BLDV-MCNC: 11 MG/DL (ref 7–20)
CALCIUM SERPL-MCNC: 8.9 MG/DL (ref 8.3–10.6)
CHLORIDE BLD-SCNC: 105 MMOL/L (ref 99–110)
CO2: 25 MMOL/L (ref 21–32)
CREAT SERPL-MCNC: 0.8 MG/DL (ref 0.6–1.2)
EOSINOPHILS ABSOLUTE: 0.4 K/UL (ref 0–0.6)
EOSINOPHILS RELATIVE PERCENT: 8 %
GFR AFRICAN AMERICAN: >60
GFR NON-AFRICAN AMERICAN: >60
GLUCOSE BLD-MCNC: 87 MG/DL (ref 70–99)
HCT VFR BLD CALC: 38.9 % (ref 36–48)
HEMOGLOBIN: 12.5 G/DL (ref 12–16)
LYMPHOCYTES ABSOLUTE: 1.4 K/UL (ref 1–5.1)
LYMPHOCYTES RELATIVE PERCENT: 30 %
MAGNESIUM: 1.9 MG/DL (ref 1.8–2.4)
MCH RBC QN AUTO: 26.3 PG (ref 26–34)
MCHC RBC AUTO-ENTMCNC: 32.2 G/DL (ref 31–36)
MCV RBC AUTO: 81.7 FL (ref 80–100)
MONOCYTES ABSOLUTE: 0.2 K/UL (ref 0–1.3)
MONOCYTES RELATIVE PERCENT: 4 %
NEUTROPHILS ABSOLUTE: 2.7 K/UL (ref 1.7–7.7)
NEUTROPHILS RELATIVE PERCENT: 57 %
PDW BLD-RTO: 15.3 % (ref 12.4–15.4)
PLATELET # BLD: 383 K/UL (ref 135–450)
PMV BLD AUTO: 6.4 FL (ref 5–10.5)
POTASSIUM REFLEX MAGNESIUM: 3.4 MMOL/L (ref 3.5–5.1)
RBC # BLD: 4.76 M/UL (ref 4–5.2)
RBC # BLD: NORMAL 10*6/UL
SODIUM BLD-SCNC: 141 MMOL/L (ref 136–145)
WBC # BLD: 4.7 K/UL (ref 4–11)

## 2018-11-02 PROCEDURE — G8988 SELF CARE GOAL STATUS: HCPCS

## 2018-11-02 PROCEDURE — 97165 OT EVAL LOW COMPLEX 30 MIN: CPT

## 2018-11-02 PROCEDURE — 6360000002 HC RX W HCPCS: Performed by: INTERNAL MEDICINE

## 2018-11-02 PROCEDURE — 85025 COMPLETE CBC W/AUTO DIFF WBC: CPT

## 2018-11-02 PROCEDURE — G8979 MOBILITY GOAL STATUS: HCPCS

## 2018-11-02 PROCEDURE — G8987 SELF CARE CURRENT STATUS: HCPCS

## 2018-11-02 PROCEDURE — G8978 MOBILITY CURRENT STATUS: HCPCS

## 2018-11-02 PROCEDURE — 1200000000 HC SEMI PRIVATE

## 2018-11-02 PROCEDURE — 80048 BASIC METABOLIC PNL TOTAL CA: CPT

## 2018-11-02 PROCEDURE — 6370000000 HC RX 637 (ALT 250 FOR IP): Performed by: INTERNAL MEDICINE

## 2018-11-02 PROCEDURE — 97162 PT EVAL MOD COMPLEX 30 MIN: CPT

## 2018-11-02 PROCEDURE — 96372 THER/PROPH/DIAG INJ SC/IM: CPT

## 2018-11-02 PROCEDURE — G0378 HOSPITAL OBSERVATION PER HR: HCPCS

## 2018-11-02 PROCEDURE — 6370000000 HC RX 637 (ALT 250 FOR IP): Performed by: NURSE PRACTITIONER

## 2018-11-02 PROCEDURE — 99221 1ST HOSP IP/OBS SF/LOW 40: CPT | Performed by: NURSE PRACTITIONER

## 2018-11-02 PROCEDURE — 94760 N-INVAS EAR/PLS OXIMETRY 1: CPT

## 2018-11-02 PROCEDURE — 97530 THERAPEUTIC ACTIVITIES: CPT

## 2018-11-02 PROCEDURE — 36415 COLL VENOUS BLD VENIPUNCTURE: CPT

## 2018-11-02 PROCEDURE — 72072 X-RAY EXAM THORAC SPINE 3VWS: CPT

## 2018-11-02 PROCEDURE — 2580000003 HC RX 258: Performed by: INTERNAL MEDICINE

## 2018-11-02 PROCEDURE — 83735 ASSAY OF MAGNESIUM: CPT

## 2018-11-02 PROCEDURE — 94664 DEMO&/EVAL PT USE INHALER: CPT

## 2018-11-02 RX ORDER — SIMVASTATIN 20 MG
20 TABLET ORAL NIGHTLY
Status: DISCONTINUED | OUTPATIENT
Start: 2018-11-02 | End: 2018-11-05 | Stop reason: HOSPADM

## 2018-11-02 RX ORDER — NICOTINE 21 MG/24HR
1 PATCH, TRANSDERMAL 24 HOURS TRANSDERMAL DAILY
Status: DISCONTINUED | OUTPATIENT
Start: 2018-11-02 | End: 2018-11-05 | Stop reason: HOSPADM

## 2018-11-02 RX ORDER — POTASSIUM CHLORIDE 20 MEQ/1
40 TABLET, EXTENDED RELEASE ORAL ONCE
Status: COMPLETED | OUTPATIENT
Start: 2018-11-02 | End: 2018-11-02

## 2018-11-02 RX ADMIN — SIMVASTATIN 20 MG: 20 TABLET, FILM COATED ORAL at 20:17

## 2018-11-02 RX ADMIN — POTASSIUM CHLORIDE 40 MEQ: 20 TABLET, EXTENDED RELEASE ORAL at 11:50

## 2018-11-02 RX ADMIN — OXYCODONE HYDROCHLORIDE AND ACETAMINOPHEN 2 TABLET: 5; 325 TABLET ORAL at 07:29

## 2018-11-02 RX ADMIN — AMLODIPINE BESYLATE 10 MG: 10 TABLET ORAL at 08:16

## 2018-11-02 RX ADMIN — PANTOPRAZOLE SODIUM 40 MG: 40 TABLET, DELAYED RELEASE ORAL at 06:18

## 2018-11-02 RX ADMIN — Medication 10 ML: at 20:17

## 2018-11-02 RX ADMIN — ZOLPIDEM TARTRATE 5 MG: 5 TABLET ORAL at 20:17

## 2018-11-02 RX ADMIN — ENOXAPARIN SODIUM 40 MG: 40 INJECTION SUBCUTANEOUS at 20:17

## 2018-11-02 RX ADMIN — OXYCODONE HYDROCHLORIDE AND ACETAMINOPHEN 2 TABLET: 5; 325 TABLET ORAL at 16:17

## 2018-11-02 RX ADMIN — OXYCODONE AND ACETAMINOPHEN 1 TABLET: 5; 325 TABLET ORAL at 20:17

## 2018-11-02 RX ADMIN — LISINOPRIL 10 MG: 5 TABLET ORAL at 08:16

## 2018-11-02 ASSESSMENT — PAIN SCALES - GENERAL
PAINLEVEL_OUTOF10: 4
PAINLEVEL_OUTOF10: 3
PAINLEVEL_OUTOF10: 7
PAINLEVEL_OUTOF10: 5
PAINLEVEL_OUTOF10: 2
PAINLEVEL_OUTOF10: 5
PAINLEVEL_OUTOF10: 2
PAINLEVEL_OUTOF10: 8

## 2018-11-02 ASSESSMENT — PAIN DESCRIPTION - PAIN TYPE
TYPE: ACUTE PAIN

## 2018-11-02 ASSESSMENT — PAIN DESCRIPTION - ORIENTATION
ORIENTATION: RIGHT
ORIENTATION: LEFT

## 2018-11-02 ASSESSMENT — PAIN DESCRIPTION - DESCRIPTORS: DESCRIPTORS: ACHING;CONSTANT

## 2018-11-02 ASSESSMENT — PAIN DESCRIPTION - ONSET: ONSET: ON-GOING

## 2018-11-02 ASSESSMENT — PAIN DESCRIPTION - LOCATION
LOCATION: RIB CAGE
LOCATION: RIB CAGE
LOCATION: LEG
LOCATION: RIB CAGE

## 2018-11-02 ASSESSMENT — PAIN DESCRIPTION - FREQUENCY: FREQUENCY: CONTINUOUS

## 2018-11-02 NOTE — TELEPHONE ENCOUNTER
Super sweet old woman with falls, now with rib fx, no hemoptysis or SOB. Needs ECF for PT OT. Signed off.

## 2018-11-02 NOTE — CONSULTS
Once  sennosides-docusate sodium (SENOKOT-S) 8.6-50 MG tablet 1 tablet, 1 tablet, Oral, Daily PRN  polyethylene glycol (GLYCOLAX) packet 17 g, 17 g, Oral, Daily PRN  pantoprazole (PROTONIX) tablet 40 mg, 40 mg, Oral, QAM AC  Mirabegron ER TB24 25 mg, 25 mg, Oral, Daily  cyclobenzaprine (FLEXERIL) tablet 10 mg, 10 mg, Oral, TID PRN  oxyCODONE-acetaminophen (PERCOCET) 5-325 MG per tablet 1 tablet, 1 tablet, Oral, Q4H PRN **OR** oxyCODONE-acetaminophen (PERCOCET) 5-325 MG per tablet 2 tablet, 2 tablet, Oral, Q4H PRN  zolpidem (AMBIEN) tablet 5 mg, 5 mg, Oral, Nightly PRN  sodium chloride flush 0.9 % injection 10 mL, 10 mL, Intravenous, 2 times per day  sodium chloride flush 0.9 % injection 10 mL, 10 mL, Intravenous, PRN  docusate sodium (COLACE) capsule 100 mg, 100 mg, Oral, BID PRN  bisacodyl (DULCOLAX) suppository 10 mg, 10 mg, Rectal, Daily PRN  ondansetron (ZOFRAN) injection 4 mg, 4 mg, Intravenous, Q4H PRN  enoxaparin (LOVENOX) injection 40 mg, 40 mg, Subcutaneous, Nightly  lisinopril (PRINIVIL;ZESTRIL) tablet 10 mg, 10 mg, Oral, Daily  amLODIPine (NORVASC) tablet 10 mg, 10 mg, Oral, Daily  Allergies:  Pcn [penicillins]    REVIEW OF SYSTEMS:    CONSTITUTIONAL:  negative for  fevers, chills and malaise  MUSCULOSKELETAL:  positive for  myalgias, arthralgias and pain  All other ROS reviewed in chart or with patient or family and are grossly negative. PHYSICAL EXAM:    VITALS:  BP (!) 172/89   Pulse 88   Temp 98.7 °F (37.1 °C) (Oral)   Resp 18   Ht 5' 2.99\" (1.6 m)   Wt 126 lb 5.2 oz (57.3 kg)   SpO2 95%   BMI 22.38 kg/m²     MUSCULOSKELETAL:  Bilateral LE grossly NVI. Left anterior rib  to touch. Able to take deep breath with encouragement, no coughing noted. Can use IS well with instruction. No gross skin bruising at this time.    NEUROLOGIC:   Sensory:    Touch:                                     Right Lower Extremity:  normal                  Left Lower Extremity:  normal    Skin warm and effusion or   pneumothorax.  No significant nodules or masses. Deborha Rase is evidence for   secretions partially obstructing left lower lobe segmental bronchi proximally.       Upper Abdomen: Partially imaged bilateral multiple renal cysts.  Small   bilateral lipid rich adrenal adenomas.       Soft Tissues/Bones: Note that the ribs are not visualized in entirety on this   exam.  Portions of the 8-12 ribs are not imaged there is nondisplaced   anterior left 8th and probably 7th rib fractures.  There is mild decreased   height of T12 at the superior endplate which may reflect a compression   fracture.  Please correlate with point tenderness.  No abnormal fluid   collections in the superficial soft tissues.           Impression   1. Nondisplaced left 8th and probably 7th rib fracture noted anteriorly. Note that the ribs are not visualized in the entirety on this exam.   2. Mild increase size of T12 with some superior endplate irregularity. Possible mild compression fracture.  Please correlate with point tenderness. If there remains clinical suspicion, noncontrast MRI would be helpful to   determine acuity. 3. Moderate centrilobular emphysema. 4. Secretions partially obstructing left lower lobe segmental bronchi   proximally.  This may be related to recent or developing infection.             IMPRESSION/RECOMMENDATIONS:    Falls  Hx CVA, remote  Generalized weakness  Difficulty with ADL  Left rib fx  PT OT following. Will need ECF or rehab before return to home with family for strengthening and stable gait  FU ortho prn concerns. Should improve over next month or so with pain.    Discussed with Dr Evelia Neville per [facility]  11/2/2018  10:07 AM

## 2018-11-02 NOTE — PROGRESS NOTES
AM medications and shift assessment complete. Pt working with therapy to get up to chair and have breakfast, will continue to monitor and assess.  Electronically signed by Lockie Halsted, RN on 11/2/2018 at 8:30 AM

## 2018-11-02 NOTE — PROGRESS NOTES
percent impaired, limited or restricted  Self Care Goal Status (): At least 20 percent but less than 40 percent impaired, limited or restricted    AM-PAC Score  AM-PAC Inpatient Daily Activity Raw Score: 14  AM-PAC Inpatient ADL T-Scale Score : 33.39  ADL Inpatient CMS 0-100% Score: 59.67  ADL Inpatient CMS G-Code Modifier : CK    Goals  Short term goals  Time Frame for Short term goals: by d/c  Short term goal 1: Pt will complete UB/LB bathing at Mod A  Short term goal 2: Pt will complete UB/LB dressing at Mod A  Short term goal 3: Pt will complete toileting tasks at [facility] A  Short term goal 4: Pt will complete fxl mobility and fxl transfers to/from ADL surfaces with use of LRAD at [facility]  Patient Goals   Patient goals : \"To go home\"     Therapy Time   Individual Concurrent Group Co-treatment   Time In 0820         Time Out 0905         Minutes 45         Timed Code Treatment Minutes: 30 Minutes     This note to serve as d/c summary if pt d/c-ed prior to next therapy session.     RIYA Reddy    I attest that I was present for and made a skilled and mindful clinical judgement during the treatment of this patient 11/2/2018    Electronically signed by Lorra Koyanagi, OTR/L [id number] on 11/2/2018 at 2:01 PM

## 2018-11-02 NOTE — PROGRESS NOTES
lean left and posteriorly)      near midline in stance with mod assist    not able to gain a good static stance      dynamic is poor in this first and limited obervation        Assessment   Body structures, Functions, Activity limitations: Decreased functional mobility ; Decreased ADL status; Decreased balance  Prognosis: Good  Decision Making: Medium Complexity  REQUIRES PT FOLLOW UP: Yes  Activity Tolerance  Activity Tolerance: Patient limited by pain         Plan   Plan  Times per week: 3-5 while on acute floor  Current Treatment Recommendations: Functional Mobility Training, Positioning, Patient/Caregiver Education & Training  Safety Devices  Type of devices: All fall risk precautions in place, Call light within reach, Chair alarm in place, Left in chair, Nurse notified (Monik)    G-Code  PT G-Codes  Functional Assessment Tool Used: [facility] mob  Score: 11  Functional Limitation: Mobility: Walking and moving around  Mobility: Walking and Moving Around Current Status (): At least 60 percent but less than 80 percent impaired, limited or restricted  Mobility: Walking and Moving Around Goal Status ():  At least 40 percent but less than 60 percent impaired, limited or restricted    AM-PAC Score  AM-PAC Inpatient Mobility Raw Score : 11  AM-PAC Inpatient T-Scale Score : 33.86  Mobility Inpatient CMS 0-100% Score: 72.57  Mobility Inpatient CMS G-Code Modifier : CL          Goals  Short term goals  Time Frame for Short term goals: 1-2 days  Short term goal 1: bed mobility at min/mod assist   Short term goal 2: transfers at min/mod assist   Short term goal 3: ambulaton at min/mod assist with AD as best able to use (appears to use straight cane at home along with walls and furniture)  Patient Goals   Patient goals : pain relief        Therapy Time   Individual Concurrent Group Co-treatment   Time In 0810         Time Out 0905         Minutes 2050 Percy Anthony, PT

## 2018-11-02 NOTE — PROGRESS NOTES
[facility] Medicine Progress Note      Admit Date: 2018         Overnight Events: none    CC: F/U for left sided rib pain, fall    HPI: The patient is a 69-year-old [place] Johan female with a history of osteoporosis, previous stroke with left sided weakness for which she ambulates with a cane, and hypertension who presented to the emergency room for evaluation of left-sided rib pain and left eye pain after she fell at home and hit her head on the stove. She is having difficulty ambulating due to her left-sided rib pain. She states that the pain is severe, sharpen burning, and made worse with movement. She reports she has fallen a couple of times in the past two weeks. She reported not feeling like herself and having little motivation since her son  one year ago. In the emergency room a CT of her head was negative for acute findings. A CT of her chest demonstrated nondisplaced left eight and probably seventh rib fractures. Xray of the thoracic spine demonstrates a T12 compression fracture. Neurosurgery has been consulted. Interval History/Subjective: Sitting in the chair. Reports depression and lack of motivation since her son  one year ago. Review of Systems:     Comprehensive ROS negative except as mentioned below.     General ROS:  []  fevers  []  chills  []  fatigue  [x]  weakness  []  night sweats  []  body aches [] Other:  HEENT ROS:  []  trauma  []  headache  []  visual changes  []  double vision  []  blurred vision  []  tinnitus  []  vertigo  []  ear ache  []  drainage  []  bleeding gums  []  hoarseness  []  voice change  []  difficult/painful swallowing  []  stuffiness  []  rhinorrhea  []  sneezing  []  epistaxis [] Other:  Respiratory ROS:  []  cough  []  SOB  []  wheezing  []  changes in sputum production or quality  []  hemoptysis  [x]  pleurisy  []  snoring [] Other:  Cardiovascular ROS:  [] palpitations  []  pain  []  MCMULLEN  []  orthopnea  []  syncope  []  lower extremity edema [] CHEST WO CONTRAST   Final Result   1. Nondisplaced left 8th and probably 7th rib fracture noted anteriorly. Note that the ribs are not visualized in the entirety on this exam.   2. Mild increase size of T12 with some superior endplate irregularity. Possible mild compression fracture. Please correlate with point tenderness. If there remains clinical suspicion, noncontrast MRI would be helpful to   determine acuity. 3. Moderate centrilobular emphysema. 4. Secretions partially obstructing left lower lobe segmental bronchi   proximally. This may be related to recent or developing infection. CT Head WO Contrast   Final Result   No acute intracranial abnormality. Moderate generalized atrophy. Advanced pattern of microvascular ischemic   disease in the cerebral white matter. .         CT CERVICAL SPINE WO CONTRAST   Final Result   No acute fracture subluxation of the cervical spine. Mild, multilevel facet arthropathy. XR KNEE LEFT (1-2 VIEWS)   Final Result   No acute osseous injury is identified. Prepatellar swelling- edema or contusion. XR HIP 2-3 VW W PELVIS LEFT   Final Result   Unremarkable AP pelvis and left hip radiographs. If pain persists or worsens, then additional evaluation with MRI is indicated   to ensure no underlying radiographically occult process such as fracture, AVN   or transient osteoporosis. Assessment & Plan:      Multiple closed rib fractures, left secondary to mechanical fall against object  PRN percocet  Encourage IS use  Ortho consultation appreciated  PT OT    T12 compression fracture  Consult neurosurgery   Will most likely need MRI    Hypokalemia  PO replacement  BMP in AM    Tobacco abuse  Nicotine patch    HTN  Continue home meds with hold parameters    Body mass index is 22.38 kg/m².     The patient was informed of the results of any tests, a time was given to answer questions, a plan was proposed and they agreed with plan.    DVT prophylaxis: [x] Lovenox  [] SQ Heparin  [] SCDs because of  [] warfarin/oral direct thrombin inhibitor [] Encourage ambulation    GI prophylaxis: [x] PPI/P1ldrfboa  [] not indicated    Probiotic if on abx: [] Yes [] No [x] Not Indicated    Diet: DIET GENERAL;    Consults:  IP CONSULT TO SOCIAL WORK  IP CONSULT TO HOME CARE NEEDS    Disposition:  [] Home  [x] Home with home health [x] Rehab [] Psych [x] SNF  [] LTAC  [] Long term nursing home or group home [] Transfer to ICU  [] Transfer to PCU [] Other:    Code Status: Full Code    ELOS: *1-2 more days depending on neurosurgery AMRITA Eugene - LORENA  11/02/18

## 2018-11-03 PROBLEM — E43 SEVERE PROTEIN-CALORIE MALNUTRITION (GOMEZ: LESS THAN 60% OF STANDARD WEIGHT) (HCC): Status: ACTIVE | Noted: 2018-11-03

## 2018-11-03 LAB
ANION GAP SERPL CALCULATED.3IONS-SCNC: 10 MMOL/L (ref 3–16)
BUN BLDV-MCNC: 9 MG/DL (ref 7–20)
CALCIUM SERPL-MCNC: 9.3 MG/DL (ref 8.3–10.6)
CHLORIDE BLD-SCNC: 105 MMOL/L (ref 99–110)
CO2: 24 MMOL/L (ref 21–32)
CREAT SERPL-MCNC: 0.8 MG/DL (ref 0.6–1.2)
GFR AFRICAN AMERICAN: >60
GFR NON-AFRICAN AMERICAN: >60
GLUCOSE BLD-MCNC: 101 MG/DL (ref 70–99)
POTASSIUM REFLEX MAGNESIUM: 4.1 MMOL/L (ref 3.5–5.1)
SODIUM BLD-SCNC: 139 MMOL/L (ref 136–145)

## 2018-11-03 PROCEDURE — 6370000000 HC RX 637 (ALT 250 FOR IP): Performed by: INTERNAL MEDICINE

## 2018-11-03 PROCEDURE — 94760 N-INVAS EAR/PLS OXIMETRY 1: CPT

## 2018-11-03 PROCEDURE — G0378 HOSPITAL OBSERVATION PER HR: HCPCS

## 2018-11-03 PROCEDURE — 6370000000 HC RX 637 (ALT 250 FOR IP): Performed by: NURSE PRACTITIONER

## 2018-11-03 PROCEDURE — 6360000002 HC RX W HCPCS: Performed by: INTERNAL MEDICINE

## 2018-11-03 PROCEDURE — 80048 BASIC METABOLIC PNL TOTAL CA: CPT

## 2018-11-03 PROCEDURE — 96372 THER/PROPH/DIAG INJ SC/IM: CPT

## 2018-11-03 PROCEDURE — 1200000000 HC SEMI PRIVATE

## 2018-11-03 PROCEDURE — 36415 COLL VENOUS BLD VENIPUNCTURE: CPT

## 2018-11-03 PROCEDURE — 2580000003 HC RX 258: Performed by: INTERNAL MEDICINE

## 2018-11-03 RX ORDER — LIDOCAINE 50 MG/G
1 PATCH TOPICAL DAILY
Qty: 30 PATCH | Refills: 0 | Status: ON HOLD | DISCHARGE
Start: 2018-11-04 | End: 2019-04-24

## 2018-11-03 RX ORDER — NICOTINE 21 MG/24HR
1 PATCH, TRANSDERMAL 24 HOURS TRANSDERMAL DAILY
Qty: 30 PATCH | Refills: 3 | Status: ON HOLD | DISCHARGE
Start: 2018-11-04 | End: 2019-04-24

## 2018-11-03 RX ORDER — OXYCODONE HYDROCHLORIDE AND ACETAMINOPHEN 5; 325 MG/1; MG/1
1 TABLET ORAL EVERY 6 HOURS PRN
Status: DISCONTINUED | OUTPATIENT
Start: 2018-11-03 | End: 2018-11-05 | Stop reason: HOSPADM

## 2018-11-03 RX ORDER — LIDOCAINE 50 MG/G
1 PATCH TOPICAL DAILY
Status: DISCONTINUED | OUTPATIENT
Start: 2018-11-03 | End: 2018-11-05 | Stop reason: HOSPADM

## 2018-11-03 RX ORDER — BISACODYL 10 MG
10 SUPPOSITORY, RECTAL RECTAL DAILY PRN
DISCHARGE
Start: 2018-11-03 | End: 2018-12-03

## 2018-11-03 RX ORDER — PSEUDOEPHEDRINE HCL 30 MG
100 TABLET ORAL 2 TIMES DAILY PRN
DISCHARGE
Start: 2018-11-03 | End: 2021-01-19

## 2018-11-03 RX ORDER — OXYCODONE HYDROCHLORIDE AND ACETAMINOPHEN 5; 325 MG/1; MG/1
2 TABLET ORAL EVERY 6 HOURS PRN
Status: DISCONTINUED | OUTPATIENT
Start: 2018-11-03 | End: 2018-11-05 | Stop reason: HOSPADM

## 2018-11-03 RX ORDER — TRAZODONE HYDROCHLORIDE 50 MG/1
50 TABLET ORAL NIGHTLY PRN
Qty: 30 TABLET | Refills: 0 | DISCHARGE
Start: 2018-11-03 | End: 2021-01-19

## 2018-11-03 RX ORDER — TRAMADOL HYDROCHLORIDE 50 MG/1
50 TABLET ORAL EVERY 6 HOURS PRN
Qty: 28 TABLET | Refills: 0 | Status: SHIPPED | OUTPATIENT
Start: 2018-11-03 | End: 2018-11-05

## 2018-11-03 RX ADMIN — OXYCODONE AND ACETAMINOPHEN 2 TABLET: 5; 325 TABLET ORAL at 18:09

## 2018-11-03 RX ADMIN — LISINOPRIL 10 MG: 5 TABLET ORAL at 10:46

## 2018-11-03 RX ADMIN — ZOLPIDEM TARTRATE 5 MG: 5 TABLET ORAL at 22:32

## 2018-11-03 RX ADMIN — CYCLOBENZAPRINE HYDROCHLORIDE 10 MG: 10 TABLET, FILM COATED ORAL at 22:32

## 2018-11-03 RX ADMIN — OXYCODONE AND ACETAMINOPHEN 1 TABLET: 5; 325 TABLET ORAL at 06:10

## 2018-11-03 RX ADMIN — Medication 10 ML: at 22:33

## 2018-11-03 RX ADMIN — SIMVASTATIN 20 MG: 20 TABLET, FILM COATED ORAL at 22:32

## 2018-11-03 RX ADMIN — OXYCODONE HYDROCHLORIDE AND ACETAMINOPHEN 2 TABLET: 5; 325 TABLET ORAL at 10:46

## 2018-11-03 RX ADMIN — AMLODIPINE BESYLATE 10 MG: 10 TABLET ORAL at 10:46

## 2018-11-03 RX ADMIN — Medication 10 ML: at 10:47

## 2018-11-03 RX ADMIN — ENOXAPARIN SODIUM 40 MG: 40 INJECTION SUBCUTANEOUS at 22:32

## 2018-11-03 RX ADMIN — PANTOPRAZOLE SODIUM 40 MG: 40 TABLET, DELAYED RELEASE ORAL at 06:10

## 2018-11-03 ASSESSMENT — PAIN DESCRIPTION - PAIN TYPE: TYPE: ACUTE PAIN

## 2018-11-03 ASSESSMENT — PAIN DESCRIPTION - FREQUENCY: FREQUENCY: CONTINUOUS

## 2018-11-03 ASSESSMENT — PAIN SCALES - GENERAL
PAINLEVEL_OUTOF10: 7
PAINLEVEL_OUTOF10: 7
PAINLEVEL_OUTOF10: 6

## 2018-11-03 ASSESSMENT — PAIN DESCRIPTION - PROGRESSION: CLINICAL_PROGRESSION: NOT CHANGED

## 2018-11-03 ASSESSMENT — PAIN DESCRIPTION - LOCATION: LOCATION: RIB CAGE

## 2018-11-03 ASSESSMENT — PAIN DESCRIPTION - ORIENTATION: ORIENTATION: LEFT

## 2018-11-03 ASSESSMENT — PAIN DESCRIPTION - DESCRIPTORS: DESCRIPTORS: ACHING

## 2018-11-04 LAB
ANION GAP SERPL CALCULATED.3IONS-SCNC: 13 MMOL/L (ref 3–16)
BUN BLDV-MCNC: 11 MG/DL (ref 7–20)
CALCIUM SERPL-MCNC: 9.6 MG/DL (ref 8.3–10.6)
CHLORIDE BLD-SCNC: 105 MMOL/L (ref 99–110)
CO2: 24 MMOL/L (ref 21–32)
CREAT SERPL-MCNC: 0.8 MG/DL (ref 0.6–1.2)
GFR AFRICAN AMERICAN: >60
GFR NON-AFRICAN AMERICAN: >60
GLUCOSE BLD-MCNC: 96 MG/DL (ref 70–99)
POTASSIUM REFLEX MAGNESIUM: 3.9 MMOL/L (ref 3.5–5.1)
SODIUM BLD-SCNC: 142 MMOL/L (ref 136–145)

## 2018-11-04 PROCEDURE — 36415 COLL VENOUS BLD VENIPUNCTURE: CPT

## 2018-11-04 PROCEDURE — 6360000002 HC RX W HCPCS: Performed by: INTERNAL MEDICINE

## 2018-11-04 PROCEDURE — 80048 BASIC METABOLIC PNL TOTAL CA: CPT

## 2018-11-04 PROCEDURE — 6370000000 HC RX 637 (ALT 250 FOR IP): Performed by: NURSE PRACTITIONER

## 2018-11-04 PROCEDURE — 6370000000 HC RX 637 (ALT 250 FOR IP): Performed by: INTERNAL MEDICINE

## 2018-11-04 PROCEDURE — 94760 N-INVAS EAR/PLS OXIMETRY 1: CPT

## 2018-11-04 PROCEDURE — 1200000000 HC SEMI PRIVATE

## 2018-11-04 PROCEDURE — 96372 THER/PROPH/DIAG INJ SC/IM: CPT

## 2018-11-04 PROCEDURE — G0378 HOSPITAL OBSERVATION PER HR: HCPCS

## 2018-11-04 PROCEDURE — 2580000003 HC RX 258: Performed by: INTERNAL MEDICINE

## 2018-11-04 RX ADMIN — SIMVASTATIN 20 MG: 20 TABLET, FILM COATED ORAL at 20:55

## 2018-11-04 RX ADMIN — AMLODIPINE BESYLATE 10 MG: 10 TABLET ORAL at 11:00

## 2018-11-04 RX ADMIN — LISINOPRIL 10 MG: 5 TABLET ORAL at 11:01

## 2018-11-04 RX ADMIN — DOCUSATE SODIUM 100 MG: 100 CAPSULE, LIQUID FILLED ORAL at 11:01

## 2018-11-04 RX ADMIN — ZOLPIDEM TARTRATE 5 MG: 5 TABLET ORAL at 20:57

## 2018-11-04 RX ADMIN — OXYCODONE AND ACETAMINOPHEN 2 TABLET: 5; 325 TABLET ORAL at 05:38

## 2018-11-04 RX ADMIN — ENOXAPARIN SODIUM 40 MG: 40 INJECTION SUBCUTANEOUS at 20:55

## 2018-11-04 RX ADMIN — PANTOPRAZOLE SODIUM 40 MG: 40 TABLET, DELAYED RELEASE ORAL at 05:38

## 2018-11-04 RX ADMIN — OXYCODONE AND ACETAMINOPHEN 2 TABLET: 5; 325 TABLET ORAL at 20:56

## 2018-11-04 RX ADMIN — OXYCODONE AND ACETAMINOPHEN 2 TABLET: 5; 325 TABLET ORAL at 14:12

## 2018-11-04 ASSESSMENT — PAIN SCALES - GENERAL
PAINLEVEL_OUTOF10: 7
PAINLEVEL_OUTOF10: 0
PAINLEVEL_OUTOF10: 7
PAINLEVEL_OUTOF10: 9
PAINLEVEL_OUTOF10: 2

## 2018-11-04 ASSESSMENT — PAIN DESCRIPTION - ORIENTATION: ORIENTATION: LEFT

## 2018-11-04 ASSESSMENT — PAIN DESCRIPTION - PAIN TYPE: TYPE: ACUTE PAIN

## 2018-11-04 ASSESSMENT — PAIN DESCRIPTION - FREQUENCY: FREQUENCY: CONTINUOUS

## 2018-11-04 ASSESSMENT — PAIN DESCRIPTION - ONSET: ONSET: ON-GOING

## 2018-11-04 ASSESSMENT — PAIN DESCRIPTION - DESCRIPTORS: DESCRIPTORS: STABBING

## 2018-11-04 ASSESSMENT — PAIN DESCRIPTION - PROGRESSION: CLINICAL_PROGRESSION: NOT CHANGED

## 2018-11-04 ASSESSMENT — PAIN DESCRIPTION - LOCATION: LOCATION: RIB CAGE

## 2018-11-05 VITALS
WEIGHT: 113.54 LBS | HEIGHT: 63 IN | SYSTOLIC BLOOD PRESSURE: 173 MMHG | HEART RATE: 91 BPM | RESPIRATION RATE: 18 BRPM | BODY MASS INDEX: 20.12 KG/M2 | OXYGEN SATURATION: 91 % | TEMPERATURE: 98.3 F | DIASTOLIC BLOOD PRESSURE: 91 MMHG

## 2018-11-05 LAB
EKG ATRIAL RATE: 87 BPM
EKG DIAGNOSIS: NORMAL
EKG P-R INTERVAL: 116 MS
EKG Q-T INTERVAL: 382 MS
EKG QRS DURATION: 86 MS
EKG QTC CALCULATION (BAZETT): 459 MS
EKG R AXIS: -12 DEGREES
EKG T AXIS: 13 DEGREES
EKG VENTRICULAR RATE: 87 BPM

## 2018-11-05 PROCEDURE — G0378 HOSPITAL OBSERVATION PER HR: HCPCS

## 2018-11-05 PROCEDURE — 6370000000 HC RX 637 (ALT 250 FOR IP): Performed by: NURSE PRACTITIONER

## 2018-11-05 PROCEDURE — 6370000000 HC RX 637 (ALT 250 FOR IP): Performed by: INTERNAL MEDICINE

## 2018-11-05 PROCEDURE — 94760 N-INVAS EAR/PLS OXIMETRY 1: CPT

## 2018-11-05 RX ORDER — TRAMADOL HYDROCHLORIDE 50 MG/1
50 TABLET ORAL EVERY 6 HOURS PRN
Qty: 12 TABLET | Refills: 0 | Status: SHIPPED | OUTPATIENT
Start: 2018-11-05 | End: 2018-11-08

## 2018-11-05 RX ADMIN — SENNOSIDES AND DOCUSATE SODIUM 1 TABLET: 8.6; 5 TABLET ORAL at 10:28

## 2018-11-05 RX ADMIN — OXYCODONE AND ACETAMINOPHEN 2 TABLET: 5; 325 TABLET ORAL at 13:21

## 2018-11-05 RX ADMIN — DOCUSATE SODIUM 100 MG: 100 CAPSULE, LIQUID FILLED ORAL at 10:28

## 2018-11-05 RX ADMIN — PANTOPRAZOLE SODIUM 40 MG: 40 TABLET, DELAYED RELEASE ORAL at 05:05

## 2018-11-05 RX ADMIN — AMLODIPINE BESYLATE 10 MG: 10 TABLET ORAL at 09:07

## 2018-11-05 RX ADMIN — OXYCODONE AND ACETAMINOPHEN 2 TABLET: 5; 325 TABLET ORAL at 05:05

## 2018-11-05 RX ADMIN — LISINOPRIL 10 MG: 5 TABLET ORAL at 09:08

## 2018-11-05 ASSESSMENT — PAIN DESCRIPTION - DESCRIPTORS
DESCRIPTORS: SHARP
DESCRIPTORS: SHARP
DESCRIPTORS: SHARP;SHOOTING
DESCRIPTORS: SHARP
DESCRIPTORS: SHARP

## 2018-11-05 ASSESSMENT — PAIN SCALES - GENERAL
PAINLEVEL_OUTOF10: 6
PAINLEVEL_OUTOF10: 8
PAINLEVEL_OUTOF10: 6
PAINLEVEL_OUTOF10: 7
PAINLEVEL_OUTOF10: 4
PAINLEVEL_OUTOF10: 4
PAINLEVEL_OUTOF10: 10

## 2018-11-05 ASSESSMENT — PAIN DESCRIPTION - PROGRESSION
CLINICAL_PROGRESSION: NOT CHANGED

## 2018-11-05 ASSESSMENT — PAIN DESCRIPTION - ONSET
ONSET: ON-GOING

## 2018-11-05 ASSESSMENT — PAIN DESCRIPTION - LOCATION: LOCATION: RIB CAGE

## 2018-11-05 ASSESSMENT — PAIN DESCRIPTION - ORIENTATION
ORIENTATION: LEFT

## 2018-11-05 ASSESSMENT — PAIN DESCRIPTION - PAIN TYPE
TYPE: ACUTE PAIN

## 2018-11-05 ASSESSMENT — PAIN DESCRIPTION - FREQUENCY
FREQUENCY: CONTINUOUS

## 2018-11-05 NOTE — CARE COORDINATION
Sw received call from Replaced by [facility] can accept patient and have obtained precert. Sw received call from patient's son, Sara Hernadez ([phone]), asking for clarification of snf level of care coverage and to see if [facility] can accept patient's insurance. Sw explained that [facility] can accept patient and have precert. Sw to call patient's son once patient is dc. Electronically signed by Stu Purdy on 11/5/2018 at 8:45 AM    11:24 AM  Sw received call from patient's daughter, Bessie Nielson ([phone]). She requested update on snf placement process and [facility] being able to accept. Electronically signed by Stu Purdy on 11/5/2018 at 11:33 AM     11:45 AM  CHAPITO electronically submitted to [facility].   Electronically signed by Kinza Connors on 11/5/2018 at 11:45 AM

## 2018-11-05 NOTE — DISCHARGE SUMMARY
Hospital Medicine Discharge Summary      Patient ID: Thalia Teresa      Patient's PCP: Paula Damon Date: 11/1/2018     Discharge Date:   11/05/18    Admitting Physician: Christian Kee MD     Discharge Physician: AMRITA Forman NP     Discharge Diagnoses: Active Hospital Problems    Diagnosis Date Noted    Severe protein-calorie malnutrition Yenifer Padron: less than 60% of standard weight) ([address] [E43] 11/03/2018    Fall against object Rafita Benitez 11/02/2018    Hypokalemia [E87.6] 11/02/2018    Multiple closed fractures of ribs [S22.49XA] 11/01/2018    Essential hypertension, benign [I10] 11/01/2018    Tobacco abuse [Z72.0] 10/17/2017    History of CVA with residual deficit [I69.30] 10/17/2017    Age-related osteoporosis with current pathol fracture of vertebra [facility]) [M80.08XA] 05/04/2017     Disposition:  [] Home  [] Home with home health [] Rehab [] Psych [x] SNF  [] LTAC  [] Long term nursing home or group home [] Transfer to ICU  [] Transfer to PCU [] Other:    Discharge Instructions/Follow-up:      Continue medications as prescribed    SNF for continued PT/OT prior to return home    F/u with PCP within 1 week of discharge    F/u with ortho as recommended    PCP/SNF to follow up: As above    Discharge Condition: Stable      Code Status:  Full Code     Activity: activity as tolerated    Diet: DIET GENERAL;     Discharge Medications:     Current Discharge Medication List           Details   lidocaine (LIDODERM) 5 % Place 1 patch onto the skin daily 12 hours on, 12 hours off.   Qty: 30 patch, Refills: 0      bisacodyl (DULCOLAX) 10 MG suppository Place 1 suppository rectally daily as needed for Constipation      docusate sodium (COLACE, DULCOLAX) 100 MG CAPS Take 100 mg by mouth 2 times daily as needed for Constipation      nicotine (NICODERM CQ) 14 MG/24HR Place 1 patch onto the skin daily  Qty: 30 patch, Refills: 3              Details   traMADol (ULTRAM) 50 MG tablet Take 1 deficits noted. Consults:     IP CONSULT TO SOCIAL WORK  IP CONSULT TO HOME CARE NEEDS    Significant Diagnostic Studies:     XR THORACIC SPINE (3 VIEWS)   Final Result   1. Age-indeterminate T12 compression fracture. If there is point tenderness,   recommend MRI of the thoracic spine. CT CHEST WO CONTRAST   Final Result   1. Nondisplaced left 8th and probably 7th rib fracture noted anteriorly. Note that the ribs are not visualized in the entirety on this exam.   2. Mild increase size of T12 with some superior endplate irregularity. Possible mild compression fracture. Please correlate with point tenderness. If there remains clinical suspicion, noncontrast MRI would be helpful to   determine acuity. 3. Moderate centrilobular emphysema. 4. Secretions partially obstructing left lower lobe segmental bronchi   proximally. This may be related to recent or developing infection. CT Head WO Contrast   Final Result   No acute intracranial abnormality. Moderate generalized atrophy. Advanced pattern of microvascular ischemic   disease in the cerebral white matter. .         CT CERVICAL SPINE WO CONTRAST   Final Result   No acute fracture subluxation of the cervical spine. Mild, multilevel facet arthropathy. XR KNEE LEFT (1-2 VIEWS)   Final Result   No acute osseous injury is identified. Prepatellar swelling- edema or contusion. XR HIP 2-3 VW W PELVIS LEFT   Final Result   Unremarkable AP pelvis and left hip radiographs. If pain persists or worsens, then additional evaluation with MRI is indicated   to ensure no underlying radiographically occult process such as fracture, AVN   or transient osteoporosis. Labs:  For convenience and continuity at follow-up the following most recent labs are provided:    CBC:    Lab Results   Component Value Date    WBC 4.7 11/02/2018    HGB 12.5 11/02/2018    HCT 38.9 11/02/2018     11/02/2018       Renal:    Lab

## 2018-11-05 NOTE — PROGRESS NOTES
A text message was left with Angeles Alexander NP that pt has lost IV access but does not get anything through it , so waiting on reply

## 2018-11-05 NOTE — FLOWSHEET NOTE
18 1034   Encounter Summary   Services provided to: Patient   Referral/Consult From: [facility] Children;Family members   Place of [employer] [facility] (visit, prayer, blessing  CL)   Complexity of Encounter Moderate   Length of Encounter 15 minutes   Spiritual/Protestant   Type Spiritual support   Assessment Calm; Approachable; Hopeful;Coping;Unresolved grief   Intervention Active listening;Explored feelings, thoughts, concerns;Prayer;Discussed belief system/Congregational practices/el;Discussed illness/injury and it's impact   Outcome Engaged in conversation;Coping; Hopeful   pt said that her son and grandchild both  within the last year. Pt said she is still grieving their deaths.   Electronically signed by James Waldrop on 2018 at 10:36 AM

## 2019-04-23 ENCOUNTER — APPOINTMENT (OUTPATIENT)
Dept: CT IMAGING | Age: 79
DRG: 305 | End: 2019-04-23
Payer: MEDICARE

## 2019-04-23 ENCOUNTER — APPOINTMENT (OUTPATIENT)
Dept: GENERAL RADIOLOGY | Age: 79
DRG: 305 | End: 2019-04-23
Payer: MEDICARE

## 2019-04-23 ENCOUNTER — HOSPITAL ENCOUNTER (INPATIENT)
Age: 79
LOS: 2 days | Discharge: HOME HEALTH | DRG: 305 | End: 2019-04-25
Attending: EMERGENCY MEDICINE | Admitting: INTERNAL MEDICINE
Payer: MEDICARE

## 2019-04-23 DIAGNOSIS — I10 UNCONTROLLED HYPERTENSION: ICD-10-CM

## 2019-04-23 DIAGNOSIS — R65.10 SYSTEMIC INFLAMMATORY RESPONSE SYNDROME (HCC): Primary | ICD-10-CM

## 2019-04-23 PROBLEM — I16.0 HYPERTENSIVE URGENCY: Status: ACTIVE | Noted: 2019-04-23

## 2019-04-23 LAB
A/G RATIO: 0.9 (ref 1.1–2.2)
ALBUMIN SERPL-MCNC: 4.2 G/DL (ref 3.4–5)
ALP BLD-CCNC: 117 U/L (ref 40–129)
ALT SERPL-CCNC: 9 U/L (ref 10–40)
ANION GAP SERPL CALCULATED.3IONS-SCNC: 17 MMOL/L (ref 3–16)
AST SERPL-CCNC: 21 U/L (ref 15–37)
BASOPHILS ABSOLUTE: 0 K/UL (ref 0–0.2)
BASOPHILS RELATIVE PERCENT: 0.4 %
BILIRUB SERPL-MCNC: 0.4 MG/DL (ref 0–1)
BILIRUBIN URINE: NEGATIVE
BLOOD, URINE: ABNORMAL
BUN BLDV-MCNC: 9 MG/DL (ref 7–20)
CALCIUM SERPL-MCNC: 9.5 MG/DL (ref 8.3–10.6)
CHLORIDE BLD-SCNC: 100 MMOL/L (ref 99–110)
CLARITY: CLEAR
CO2: 24 MMOL/L (ref 21–32)
COLOR: YELLOW
CREAT SERPL-MCNC: 0.7 MG/DL (ref 0.6–1.2)
EOSINOPHILS ABSOLUTE: 0 K/UL (ref 0–0.6)
EOSINOPHILS RELATIVE PERCENT: 0.7 %
GFR AFRICAN AMERICAN: >60
GFR NON-AFRICAN AMERICAN: >60
GLOBULIN: 4.6 G/DL
GLUCOSE BLD-MCNC: 110 MG/DL (ref 70–99)
GLUCOSE URINE: NEGATIVE MG/DL
HCT VFR BLD CALC: 45.9 % (ref 36–48)
HEMOGLOBIN: 15 G/DL (ref 12–16)
KETONES, URINE: ABNORMAL MG/DL
LACTIC ACID: 1.3 MMOL/L (ref 0.4–2)
LEUKOCYTE ESTERASE, URINE: NEGATIVE
LYMPHOCYTES ABSOLUTE: 0.7 K/UL (ref 1–5.1)
LYMPHOCYTES RELATIVE PERCENT: 10.3 %
MCH RBC QN AUTO: 26.1 PG (ref 26–34)
MCHC RBC AUTO-ENTMCNC: 32.6 G/DL (ref 31–36)
MCV RBC AUTO: 80.1 FL (ref 80–100)
MICROSCOPIC EXAMINATION: YES
MONOCYTES ABSOLUTE: 0.7 K/UL (ref 0–1.3)
MONOCYTES RELATIVE PERCENT: 10.5 %
NEUTROPHILS ABSOLUTE: 5.1 K/UL (ref 1.7–7.7)
NEUTROPHILS RELATIVE PERCENT: 78.1 %
NITRITE, URINE: NEGATIVE
PDW BLD-RTO: 15.8 % (ref 12.4–15.4)
PH UA: 8 (ref 5–8)
PLATELET # BLD: 354 K/UL (ref 135–450)
PMV BLD AUTO: 6.8 FL (ref 5–10.5)
POTASSIUM SERPL-SCNC: 4 MMOL/L (ref 3.5–5.1)
PROTEIN UA: 30 MG/DL
RAPID INFLUENZA  B AGN: NEGATIVE
RAPID INFLUENZA A AGN: NEGATIVE
RBC # BLD: 5.73 M/UL (ref 4–5.2)
RBC UA: NORMAL /HPF (ref 0–2)
SODIUM BLD-SCNC: 141 MMOL/L (ref 136–145)
SPECIFIC GRAVITY UA: 1.01 (ref 1–1.03)
TOTAL PROTEIN: 8.8 G/DL (ref 6.4–8.2)
TROPONIN: <0.01 NG/ML
URINE TYPE: ABNORMAL
UROBILINOGEN, URINE: 1 E.U./DL
WBC # BLD: 6.5 K/UL (ref 4–11)

## 2019-04-23 PROCEDURE — 80053 COMPREHEN METABOLIC PANEL: CPT

## 2019-04-23 PROCEDURE — 6360000004 HC RX CONTRAST MEDICATION: Performed by: EMERGENCY MEDICINE

## 2019-04-23 PROCEDURE — 85025 COMPLETE CBC W/AUTO DIFF WBC: CPT

## 2019-04-23 PROCEDURE — 87040 BLOOD CULTURE FOR BACTERIA: CPT

## 2019-04-23 PROCEDURE — 2060000000 HC ICU INTERMEDIATE R&B

## 2019-04-23 PROCEDURE — 6360000002 HC RX W HCPCS: Performed by: PHYSICIAN ASSISTANT

## 2019-04-23 PROCEDURE — 99285 EMERGENCY DEPT VISIT HI MDM: CPT

## 2019-04-23 PROCEDURE — 93005 ELECTROCARDIOGRAM TRACING: CPT | Performed by: PHYSICIAN ASSISTANT

## 2019-04-23 PROCEDURE — 74177 CT ABD & PELVIS W/CONTRAST: CPT

## 2019-04-23 PROCEDURE — 96375 TX/PRO/DX INJ NEW DRUG ADDON: CPT

## 2019-04-23 PROCEDURE — 2580000003 HC RX 258: Performed by: PHYSICIAN ASSISTANT

## 2019-04-23 PROCEDURE — 81001 URINALYSIS AUTO W/SCOPE: CPT

## 2019-04-23 PROCEDURE — 96374 THER/PROPH/DIAG INJ IV PUSH: CPT

## 2019-04-23 PROCEDURE — 6370000000 HC RX 637 (ALT 250 FOR IP): Performed by: PHYSICIAN ASSISTANT

## 2019-04-23 PROCEDURE — 71046 X-RAY EXAM CHEST 2 VIEWS: CPT

## 2019-04-23 PROCEDURE — 83605 ASSAY OF LACTIC ACID: CPT

## 2019-04-23 PROCEDURE — 87804 INFLUENZA ASSAY W/OPTIC: CPT

## 2019-04-23 PROCEDURE — 84484 ASSAY OF TROPONIN QUANT: CPT

## 2019-04-23 RX ORDER — ONDANSETRON 2 MG/ML
4 INJECTION INTRAMUSCULAR; INTRAVENOUS ONCE
Status: COMPLETED | OUTPATIENT
Start: 2019-04-23 | End: 2019-04-23

## 2019-04-23 RX ORDER — 0.9 % SODIUM CHLORIDE 0.9 %
500 INTRAVENOUS SOLUTION INTRAVENOUS ONCE
Status: COMPLETED | OUTPATIENT
Start: 2019-04-23 | End: 2019-04-23

## 2019-04-23 RX ORDER — AMLODIPINE BESYLATE 5 MG/1
10 TABLET ORAL ONCE
Status: COMPLETED | OUTPATIENT
Start: 2019-04-23 | End: 2019-04-23

## 2019-04-23 RX ORDER — ACETAMINOPHEN 325 MG/1
650 TABLET ORAL ONCE
Status: COMPLETED | OUTPATIENT
Start: 2019-04-23 | End: 2019-04-23

## 2019-04-23 RX ORDER — MORPHINE SULFATE 2 MG/ML
4 INJECTION, SOLUTION INTRAMUSCULAR; INTRAVENOUS ONCE
Status: COMPLETED | OUTPATIENT
Start: 2019-04-23 | End: 2019-04-23

## 2019-04-23 RX ORDER — AMLODIPINE BESYLATE 5 MG/1
10 TABLET ORAL DAILY
Status: DISCONTINUED | OUTPATIENT
Start: 2019-04-24 | End: 2019-04-23

## 2019-04-23 RX ADMIN — ACETAMINOPHEN 650 MG: 325 TABLET ORAL at 20:13

## 2019-04-23 RX ADMIN — ONDANSETRON 4 MG: 2 INJECTION INTRAMUSCULAR; INTRAVENOUS at 21:12

## 2019-04-23 RX ADMIN — AMLODIPINE BESYLATE 10 MG: 5 TABLET ORAL at 22:23

## 2019-04-23 RX ADMIN — SODIUM CHLORIDE 500 ML: 9 INJECTION, SOLUTION INTRAVENOUS at 20:14

## 2019-04-23 RX ADMIN — IOPAMIDOL 75 ML: 755 INJECTION, SOLUTION INTRAVENOUS at 20:44

## 2019-04-23 RX ADMIN — MORPHINE SULFATE 4 MG: 2 INJECTION, SOLUTION INTRAMUSCULAR; INTRAVENOUS at 21:14

## 2019-04-23 ASSESSMENT — PAIN DESCRIPTION - DESCRIPTORS
DESCRIPTORS: HEADACHE
DESCRIPTORS: SORE;CRAMPING

## 2019-04-23 ASSESSMENT — PAIN DESCRIPTION - ONSET: ONSET: ON-GOING

## 2019-04-23 ASSESSMENT — PAIN DESCRIPTION - ORIENTATION: ORIENTATION: LEFT;RIGHT

## 2019-04-23 ASSESSMENT — ENCOUNTER SYMPTOMS
ABDOMINAL PAIN: 0
BACK PAIN: 0
COLOR CHANGE: 0
SHORTNESS OF BREATH: 0
VOMITING: 0
COUGH: 1

## 2019-04-23 ASSESSMENT — PAIN DESCRIPTION - PAIN TYPE
TYPE: ACUTE PAIN
TYPE: ACUTE PAIN

## 2019-04-23 ASSESSMENT — PAIN SCALES - GENERAL
PAINLEVEL_OUTOF10: 10
PAINLEVEL_OUTOF10: 5
PAINLEVEL_OUTOF10: 6
PAINLEVEL_OUTOF10: 9

## 2019-04-23 ASSESSMENT — PAIN DESCRIPTION - PROGRESSION: CLINICAL_PROGRESSION: NOT CHANGED

## 2019-04-23 ASSESSMENT — PAIN DESCRIPTION - LOCATION
LOCATION: LEG
LOCATION: HEAD

## 2019-04-23 ASSESSMENT — PAIN DESCRIPTION - FREQUENCY
FREQUENCY: CONTINUOUS
FREQUENCY: CONTINUOUS

## 2019-04-23 NOTE — ED NOTES
Handoff given to [facility] to assume care. Pt pain addressed at this time.       Gerhard Quinones RN  04/23/19 1923

## 2019-04-23 NOTE — LETTER
[place] [address]  Phone: [phone]             April 24, 2019    Patient: Robi Ayala   YOB: 1940   Date of Visit: 4/23/2019       To Whom It May Concern:    Becka Pleitez, mother of Ilan Angela, was admitted to our facility on 4/23/19. Length of stay is undetermined at this time.        Sincerely,       ERIKA Fuller         Signature:__________________________________

## 2019-04-23 NOTE — ED NOTES
Pt A&O to ED from home with c/o weakness the last 2 days with flu like symptoms. EMS reports a temp of 101.7 and BP of 200's/110's. Pt reports a headache and states she feels like she has a head cold. Pt states that she uses a walker to get around.       Lizet Tirado, [place]  04/23/19 [id number]

## 2019-04-24 LAB
ANION GAP SERPL CALCULATED.3IONS-SCNC: 13 MMOL/L (ref 3–16)
BASOPHILS ABSOLUTE: 0 K/UL (ref 0–0.2)
BASOPHILS RELATIVE PERCENT: 1 %
BUN BLDV-MCNC: 9 MG/DL (ref 7–20)
CALCIUM SERPL-MCNC: 8.9 MG/DL (ref 8.3–10.6)
CHLORIDE BLD-SCNC: 100 MMOL/L (ref 99–110)
CO2: 26 MMOL/L (ref 21–32)
CREAT SERPL-MCNC: 0.9 MG/DL (ref 0.6–1.2)
EOSINOPHILS ABSOLUTE: 0.1 K/UL (ref 0–0.6)
EOSINOPHILS RELATIVE PERCENT: 1.7 %
GFR AFRICAN AMERICAN: >60
GFR NON-AFRICAN AMERICAN: >60
GLUCOSE BLD-MCNC: 90 MG/DL (ref 70–99)
HCT VFR BLD CALC: 40.9 % (ref 36–48)
HEMOGLOBIN: 13.5 G/DL (ref 12–16)
LYMPHOCYTES ABSOLUTE: 0.9 K/UL (ref 1–5.1)
LYMPHOCYTES RELATIVE PERCENT: 18.5 %
MAGNESIUM: 1.9 MG/DL (ref 1.8–2.4)
MCH RBC QN AUTO: 26.7 PG (ref 26–34)
MCHC RBC AUTO-ENTMCNC: 33 G/DL (ref 31–36)
MCV RBC AUTO: 81.1 FL (ref 80–100)
MONOCYTES ABSOLUTE: 0.6 K/UL (ref 0–1.3)
MONOCYTES RELATIVE PERCENT: 12.6 %
NEUTROPHILS ABSOLUTE: 3.3 K/UL (ref 1.7–7.7)
NEUTROPHILS RELATIVE PERCENT: 66.2 %
PDW BLD-RTO: 15.7 % (ref 12.4–15.4)
PLATELET # BLD: 324 K/UL (ref 135–450)
PMV BLD AUTO: 6.6 FL (ref 5–10.5)
POTASSIUM REFLEX MAGNESIUM: 3.1 MMOL/L (ref 3.5–5.1)
RBC # BLD: 5.04 M/UL (ref 4–5.2)
SODIUM BLD-SCNC: 139 MMOL/L (ref 136–145)
WBC # BLD: 5 K/UL (ref 4–11)

## 2019-04-24 PROCEDURE — 93010 ELECTROCARDIOGRAM REPORT: CPT | Performed by: INTERNAL MEDICINE

## 2019-04-24 PROCEDURE — 80048 BASIC METABOLIC PNL TOTAL CA: CPT

## 2019-04-24 PROCEDURE — 6370000000 HC RX 637 (ALT 250 FOR IP): Performed by: INTERNAL MEDICINE

## 2019-04-24 PROCEDURE — 83735 ASSAY OF MAGNESIUM: CPT

## 2019-04-24 PROCEDURE — 94760 N-INVAS EAR/PLS OXIMETRY 1: CPT

## 2019-04-24 PROCEDURE — 6370000000 HC RX 637 (ALT 250 FOR IP): Performed by: NURSE PRACTITIONER

## 2019-04-24 PROCEDURE — 2060000000 HC ICU INTERMEDIATE R&B

## 2019-04-24 PROCEDURE — 85025 COMPLETE CBC W/AUTO DIFF WBC: CPT

## 2019-04-24 PROCEDURE — 36415 COLL VENOUS BLD VENIPUNCTURE: CPT

## 2019-04-24 PROCEDURE — 2580000003 HC RX 258: Performed by: INTERNAL MEDICINE

## 2019-04-24 PROCEDURE — 6360000002 HC RX W HCPCS: Performed by: INTERNAL MEDICINE

## 2019-04-24 PROCEDURE — 87486 CHLMYD PNEUM DNA AMP PROBE: CPT

## 2019-04-24 PROCEDURE — 87633 RESP VIRUS 12-25 TARGETS: CPT

## 2019-04-24 PROCEDURE — 87581 M.PNEUMON DNA AMP PROBE: CPT

## 2019-04-24 PROCEDURE — 87798 DETECT AGENT NOS DNA AMP: CPT

## 2019-04-24 RX ORDER — TRAZODONE HYDROCHLORIDE 50 MG/1
50 TABLET ORAL NIGHTLY PRN
Status: DISCONTINUED | OUTPATIENT
Start: 2019-04-24 | End: 2019-04-25 | Stop reason: HOSPADM

## 2019-04-24 RX ORDER — NIFEDIPINE 30 MG/1
30 TABLET, EXTENDED RELEASE ORAL DAILY
Status: DISCONTINUED | OUTPATIENT
Start: 2019-04-24 | End: 2019-04-25

## 2019-04-24 RX ORDER — AMLODIPINE BESYLATE 10 MG/1
10 TABLET ORAL DAILY
Status: DISCONTINUED | OUTPATIENT
Start: 2019-04-24 | End: 2019-04-24

## 2019-04-24 RX ORDER — POTASSIUM CHLORIDE 20 MEQ/1
20 TABLET, EXTENDED RELEASE ORAL 2 TIMES DAILY WITH MEALS
Status: DISCONTINUED | OUTPATIENT
Start: 2019-04-24 | End: 2019-04-25 | Stop reason: HOSPADM

## 2019-04-24 RX ORDER — HYDROCHLOROTHIAZIDE 25 MG/1
25 TABLET ORAL DAILY
Status: DISCONTINUED | OUTPATIENT
Start: 2019-04-24 | End: 2019-04-25 | Stop reason: HOSPADM

## 2019-04-24 RX ORDER — PANTOPRAZOLE SODIUM 40 MG/1
40 TABLET, DELAYED RELEASE ORAL
Status: DISCONTINUED | OUTPATIENT
Start: 2019-04-24 | End: 2019-04-25 | Stop reason: HOSPADM

## 2019-04-24 RX ORDER — SODIUM CHLORIDE 0.9 % (FLUSH) 0.9 %
10 SYRINGE (ML) INJECTION EVERY 12 HOURS SCHEDULED
Status: DISCONTINUED | OUTPATIENT
Start: 2019-04-24 | End: 2019-04-25 | Stop reason: HOSPADM

## 2019-04-24 RX ORDER — ONDANSETRON 2 MG/ML
4 INJECTION INTRAMUSCULAR; INTRAVENOUS EVERY 6 HOURS PRN
Status: DISCONTINUED | OUTPATIENT
Start: 2019-04-24 | End: 2019-04-25 | Stop reason: HOSPADM

## 2019-04-24 RX ORDER — ACETAMINOPHEN 325 MG/1
650 TABLET ORAL EVERY 4 HOURS PRN
Status: DISCONTINUED | OUTPATIENT
Start: 2019-04-24 | End: 2019-04-25 | Stop reason: HOSPADM

## 2019-04-24 RX ORDER — CYCLOBENZAPRINE HCL 10 MG
10 TABLET ORAL 3 TIMES DAILY PRN
Status: DISCONTINUED | OUTPATIENT
Start: 2019-04-24 | End: 2019-04-25 | Stop reason: HOSPADM

## 2019-04-24 RX ORDER — LISINOPRIL 10 MG/1
10 TABLET ORAL DAILY
Status: DISCONTINUED | OUTPATIENT
Start: 2019-04-24 | End: 2019-04-25 | Stop reason: HOSPADM

## 2019-04-24 RX ORDER — LACTOBACILLUS RHAMNOSUS GG 10B CELL
1 CAPSULE ORAL 2 TIMES DAILY WITH MEALS
Status: DISCONTINUED | OUTPATIENT
Start: 2019-04-24 | End: 2019-04-25 | Stop reason: HOSPADM

## 2019-04-24 RX ORDER — HYDRALAZINE HYDROCHLORIDE 20 MG/ML
10 INJECTION INTRAMUSCULAR; INTRAVENOUS EVERY 6 HOURS PRN
Status: DISCONTINUED | OUTPATIENT
Start: 2019-04-24 | End: 2019-04-25 | Stop reason: HOSPADM

## 2019-04-24 RX ORDER — SODIUM CHLORIDE 0.9 % (FLUSH) 0.9 %
10 SYRINGE (ML) INJECTION PRN
Status: DISCONTINUED | OUTPATIENT
Start: 2019-04-24 | End: 2019-04-25 | Stop reason: HOSPADM

## 2019-04-24 RX ADMIN — PANTOPRAZOLE SODIUM 40 MG: 40 TABLET, DELAYED RELEASE ORAL at 08:49

## 2019-04-24 RX ADMIN — NIFEDIPINE 30 MG: 30 TABLET, FILM COATED, EXTENDED RELEASE ORAL at 17:49

## 2019-04-24 RX ADMIN — CYCLOBENZAPRINE HYDROCHLORIDE 10 MG: 10 TABLET, FILM COATED ORAL at 14:48

## 2019-04-24 RX ADMIN — HYDROCHLOROTHIAZIDE 25 MG: 25 TABLET ORAL at 17:49

## 2019-04-24 RX ADMIN — CYCLOBENZAPRINE HYDROCHLORIDE 10 MG: 10 TABLET, FILM COATED ORAL at 06:19

## 2019-04-24 RX ADMIN — AMLODIPINE BESYLATE 10 MG: 10 TABLET ORAL at 08:49

## 2019-04-24 RX ADMIN — ACETAMINOPHEN 650 MG: 325 TABLET ORAL at 02:10

## 2019-04-24 RX ADMIN — Medication 10 ML: at 13:24

## 2019-04-24 RX ADMIN — POTASSIUM CHLORIDE 20 MEQ: 20 TABLET, EXTENDED RELEASE ORAL at 14:48

## 2019-04-24 RX ADMIN — TRAZODONE HYDROCHLORIDE 50 MG: 50 TABLET ORAL at 02:10

## 2019-04-24 RX ADMIN — CEFTRIAXONE 1 G: 1 INJECTION, POWDER, FOR SOLUTION INTRAMUSCULAR; INTRAVENOUS at 02:13

## 2019-04-24 RX ADMIN — LISINOPRIL 10 MG: 10 TABLET ORAL at 08:49

## 2019-04-24 RX ADMIN — POTASSIUM CHLORIDE 20 MEQ: 20 TABLET, EXTENDED RELEASE ORAL at 17:49

## 2019-04-24 RX ADMIN — Medication 1 CAPSULE: at 17:49

## 2019-04-24 RX ADMIN — Medication 10 ML: at 21:29

## 2019-04-24 RX ADMIN — ACETAMINOPHEN 650 MG: 325 TABLET ORAL at 08:49

## 2019-04-24 RX ADMIN — ENOXAPARIN SODIUM 40 MG: 40 INJECTION SUBCUTANEOUS at 08:49

## 2019-04-24 ASSESSMENT — PAIN DESCRIPTION - ORIENTATION: ORIENTATION: LEFT;RIGHT

## 2019-04-24 ASSESSMENT — PAIN SCALES - GENERAL
PAINLEVEL_OUTOF10: 6
PAINLEVEL_OUTOF10: 0
PAINLEVEL_OUTOF10: 6
PAINLEVEL_OUTOF10: 0
PAINLEVEL_OUTOF10: 0
PAINLEVEL_OUTOF10: 10
PAINLEVEL_OUTOF10: 0

## 2019-04-24 ASSESSMENT — PAIN - FUNCTIONAL ASSESSMENT
PAIN_FUNCTIONAL_ASSESSMENT: PREVENTS OR INTERFERES SOME ACTIVE ACTIVITIES AND ADLS
PAIN_FUNCTIONAL_ASSESSMENT: PREVENTS OR INTERFERES SOME ACTIVE ACTIVITIES AND ADLS

## 2019-04-24 ASSESSMENT — PAIN DESCRIPTION - ONSET: ONSET: GRADUAL

## 2019-04-24 ASSESSMENT — PAIN DESCRIPTION - FREQUENCY: FREQUENCY: INTERMITTENT

## 2019-04-24 ASSESSMENT — PAIN DESCRIPTION - PROGRESSION: CLINICAL_PROGRESSION: NOT CHANGED

## 2019-04-24 ASSESSMENT — PAIN DESCRIPTION - PAIN TYPE
TYPE: CHRONIC PAIN
TYPE: CHRONIC PAIN;ACUTE PAIN

## 2019-04-24 ASSESSMENT — PAIN DESCRIPTION - LOCATION
LOCATION: HEAD;LEG
LOCATION: BACK

## 2019-04-24 ASSESSMENT — PAIN DESCRIPTION - DESCRIPTORS
DESCRIPTORS: CONSTANT
DESCRIPTORS: ACHING;CRAMPING

## 2019-04-24 NOTE — PLAN OF CARE
Pt has complaints of pain, mainly in her back pt sts chronic. Tylenol given prn.   Continue to monitor and poc

## 2019-04-24 NOTE — H&P
Hospital Medicine History & Physical      PCP: [facility]    Date of Admission: 4/23/2019    Date of Service: Pt seen/examined on 4/24/19  and Admitted to Inpatient with expected LOS greater than two midnights due to medical therapy. Chief Complaint:  Elevated bp    History Of Present Illness:      77 y.o. female who presented to [facility] \[facility]\"" AT [place] with fatigue. Patient has been feeling unwell for the last several days has had fevers eyes 101.7. Patient came to the hospital today and was noted to have markedly elevated blood pressure. She is admitted to the hospital for care. There's been no dysuria. No abdominal pain. No productive cough. Is a poor historian in her present condition but was providing some history to the nurse earlier. Past Medical History:          Diagnosis Date    CARROLL positive     Arthritis     Bleeding stomach ulcer     Diverticulitis     GERD (gastroesophageal reflux disease)     Hypertension     Stroke ([address] 2002    left weakness     Tobacco abuse        Past Surgical History:          Procedure Laterality Date    CHOLECYSTECTOMY  2014    COLONOSCOPY  2011    HEMORRHOID SURGERY      HYSTERECTOMY, TOTAL ABDOMINAL  1970's    JACQUELYN/BSO for 'tumor'       Medications Prior to Admission:      Prior to Admission medications    Medication Sig Start Date End Date Taking?  Authorizing Provider   traZODone (DESYREL) 50 MG tablet Take 1 tablet by mouth nightly as needed for Sleep 11/3/18   Yoselin Wu MD   lidocaine (LIDODERM) 5 % Place 1 patch onto the skin daily 12 hours on, 12 hours off. 11/4/18   Yoselin Wu MD   docusate sodium (COLACE, DULCOLAX) 100 MG CAPS Take 100 mg by mouth 2 times daily as needed for Constipation 11/3/18   Yoselin Wu MD   nicotine (NICODERM CQ) 14 MG/24HR Place 1 patch onto the skin daily 11/4/18   Yoselin Wu MD   lisinopril (PRINIVIL;ZESTRIL) 10 MG tablet TAKE ONE TABLET BY MOUTH DAILY 10/5/18 Historical Provider, MD   Mirabegron ER 25 MG TB24 Take 25 mg by mouth daily 4/4/18   Historical Provider, MD   cyclobenzaprine (FLEXERIL) 10 MG tablet Take 1 tablet by mouth 3 times daily as needed for Muscle spasms 9/29/18   Saumya Officer, MD   amLODIPine (NORVASC) 10 MG tablet TAKE ONE TABLET BY MOUTH DAILY 1/30/18   Joe Andrews MD   pantoprazole (PROTONIX) 40 MG tablet Take 1 tablet by mouth every morning (before breakfast) 10/17/17   Joe Andrews MD   simvastatin (ZOCOR) 40 MG tablet Take 1 tablet by mouth nightly 10/17/17   Joe Andrews MD   polyethylene glycol [facility]) packet Take 17 g by mouth daily as needed for Constipation    Historical Provider, MD   senna-docusate (PERICOLACE) 8.6-50 MG per tablet Take 1 tablet by mouth daily as needed for Constipation    Historical Provider, MD   calcium-vitamin D (OSCAL-500) 500-200 MG-UNIT per tablet Take 1 tablet by mouth daily. Historical Provider, MD   diphenoxylate-atropine (LOMOTIL) 2.5-0.025 MG per tablet Take 1 tablet by mouth 4 times daily as needed for Diarrhea. Historical Provider, MD   Multiple Vitamins-Minerals (THERA-M) TABS Take 1 tablet by mouth daily. Historical Provider, MD       Allergies:  Pcn [penicillins]    Social History:      The patient currently lives with family    TOBACCO:   reports that she has been smoking cigarettes. She has a 12.50 pack-year smoking history. She has never used smokeless tobacco.  ETOH:   reports that she does not drink alcohol. Family History:      Reviewed in detail and negative for DM, CAD, Cancer, CVA. Positive as follows:        Problem Relation Age of Onset    High Blood Pressure Mother     High Blood Pressure Father     High Blood Pressure Daughter     Cancer Son        REVIEW OF SYSTEMS:   Pertinent positives as noted in the HPI. All other systems reviewed and negative.     PHYSICAL EXAM PERFORMED:    BP (!) 145/77 Comment: No movement  Pulse 84   Temp 98.7 °F (37.1 °C) IV CONTRAST Additional Contrast? None   Final Result   No acute abnormality of the abdomen or pelvis. Mucous plugging versus aspiration within a few left lower lobe bronchi but no   evidence of pneumonia. The IV contrast infiltrated within the arm upon administration. No   significant contrast present on this exam.  Treatment to be provided in the   emergency room where the patient is located. XR CHEST STANDARD (2 VW)   Final Result   No acute cardiopulmonary disease. ASSESSMENT:    Febrile illness-suspect most likely bronchitis based on review of CAT scan. Check flu titers and respiratory viral titers as well. Hypertensive urgency-we'll start patient on labetalol iv for sbp>180 dbp>100. Suspect poor compliance  Restart ace and norvasc    GERD-continue protonix        DVT Prophylaxis: lovenox  Diet: DIET LOW SODIUM 2 GM;  Code Status: Full Code        Dispo -2-3 d       Rossie Leventhal, MD    Thank you [address] for the opportunity to be involved in this patient's care. If you have any questions or concerns please feel free to contact me at [phone].

## 2019-04-24 NOTE — ED PROVIDER NOTES
[facility]  eMERGENCY dEPARTMENT eNCOUnter      Pt Name: Jeet Brooke  MRN: [id number]  [place] 1940  Date of evaluation: 4/23/2019  Provider: [facility]       Chief Complaint   Patient presents with    Fatigue     x 2 days worse today, with flu like synptoms.  Fever     101.7    Hypertension     200's/110's       CRITICAL CARE TIME   Total Critical Care time was 15 minutes, excluding separately reportable procedures. There was a high probability of clinically significant/life threatening deterioration in the patient's condition which required my urgent intervention. HISTORY OF PRESENT ILLNESS  (Location/Symptom, Timing/Onset, Context/Setting, Quality, Duration, Modifying Factors, Severity.)   Jeet Brooke is a 77 y.o. female who presents to the emergency department via EMS from home where she lives with her son. Patient's daughter presents to the ER with her. She's had 2 or 3 days of fatigue, generalized weakness, headaches and body aches. She reports a fever of 101.7°F at home. Blood pressures have been elevated today as well. Reports taking her blood pressure medication this morning as prescribed. Patient denies known sick contacts no vomiting. She complains of some abdominal cramping. No diarrhea. She has past medical history of hyperlipidemia, stroke and hypertension. Nursing Notes were reviewed and I agree. REVIEW OF SYSTEMS    (2-9 systems for level 4, 10 or more for level 5)     Review of Systems   Constitutional: Positive for fatigue. Negative for fever. Respiratory: Positive for cough. Negative for shortness of breath. Cardiovascular: Negative for chest pain. Gastrointestinal: Negative for abdominal pain (Abdominal cramping) and vomiting. Musculoskeletal: Negative for back pain. Skin: Negative for color change, rash and wound. Neurological: Positive for weakness (Generalized). Psychiatric/Behavioral: Negative for agitation, behavioral problems and confusion. Except as noted above the remainder of the review of systems was reviewed and negative. PAST MEDICAL HISTORY         Diagnosis Date    CARROLL positive     Arthritis     Bleeding stomach ulcer     Diverticulitis     GERD (gastroesophageal reflux disease)     Hypertension     Stroke ([facility] [address] 2002    left weakness     Tobacco abuse        SURGICAL HISTORY           Procedure Laterality Date    CHOLECYSTECTOMY  2014    COLONOSCOPY  2011    HEMORRHOID SURGERY      HYSTERECTOMY, TOTAL ABDOMINAL  1970's    JACQUELYN/BSO for 'tumor'       CURRENT MEDICATIONS       Previous Medications    AMLODIPINE (NORVASC) 10 MG TABLET    TAKE ONE TABLET BY MOUTH DAILY    CALCIUM-VITAMIN D (OSCAL-500) 500-200 MG-UNIT PER TABLET    Take 1 tablet by mouth daily. CYCLOBENZAPRINE (FLEXERIL) 10 MG TABLET    Take 1 tablet by mouth 3 times daily as needed for Muscle spasms    DIPHENOXYLATE-ATROPINE (LOMOTIL) 2.5-0.025 MG PER TABLET    Take 1 tablet by mouth 4 times daily as needed for Diarrhea. DOCUSATE SODIUM (COLACE, DULCOLAX) 100 MG CAPS    Take 100 mg by mouth 2 times daily as needed for Constipation    LIDOCAINE (LIDODERM) 5 %    Place 1 patch onto the skin daily 12 hours on, 12 hours off. LISINOPRIL (PRINIVIL;ZESTRIL) 10 MG TABLET    TAKE ONE TABLET BY MOUTH DAILY    MIRABEGRON ER 25 MG TB24    Take 25 mg by mouth daily    MULTIPLE VITAMINS-MINERALS (THERA-M) TABS    Take 1 tablet by mouth daily.     NICOTINE (NICODERM CQ) 14 MG/24HR    Place 1 patch onto the skin daily    PANTOPRAZOLE (PROTONIX) 40 MG TABLET    Take 1 tablet by mouth every morning (before breakfast)    POLYETHYLENE GLYCOL (GLYCOLAX) PACKET    Take 17 g by mouth daily as needed for Constipation    SENNA-DOCUSATE (PERICOLACE) 8.6-50 MG PER TABLET    Take 1 tablet by mouth daily as needed for Constipation    SIMVASTATIN (ZOCOR) 40 MG TABLET    Take 1 tablet by mouth nightly TRAZODONE (DESYREL) 50 MG TABLET    Take 1 tablet by mouth nightly as needed for Sleep       ALLERGIES     Pcn [penicillins]    FAMILY HISTORY           Problem Relation Age of Onset    High Blood Pressure Mother     High Blood Pressure Father     High Blood Pressure Daughter     Cancer Son      Family Status   Relation Name Status    Mother      Father      Belinda  (Not Specified)    Son          SOCIAL HISTORY      reports that she has been smoking cigarettes. She has a 12.50 pack-year smoking history. She has never used smokeless tobacco. She reports that she does not drink alcohol or use drugs. PHYSICAL EXAM    (up to 7 for level 4, 8 or more for level 5)     ED Triage Vitals [19 1831]   BP Temp Temp Source Pulse Resp SpO2 Height Weight   (!) 155/116 100.6 °F (38.1 °C) Oral 121 19 97 % -- --     Physical Exam   Constitutional: She is oriented to person, place, and time. She appears well-developed and well-nourished. No distress. HENT:   Head: Normocephalic and atraumatic. Eyes: Pupils are equal, round, and reactive to light. Neck: Normal range of motion. Cardiovascular: Regular rhythm. Tachycardia present. Pulmonary/Chest: Effort normal. No respiratory distress. Abdominal: Soft. There is no tenderness. Neurological: She is alert and oriented to person, place, and time. No cranial nerve deficit. Skin: Skin is warm. Psychiatric: She has a normal mood and affect. Her behavior is normal.   Nursing note and vitals reviewed. DIAGNOSTIC RESULTS     EKG: All EKG's are interpreted by DEAN Mejia in the absence of a cardiologist.    EKG interpreted by myself - please refer to attending physician's note for complete EKG interpretation:    Rhythm: sinus rhythm   No evidence of acute ischemia or injury.     RADIOLOGY:   Non-plain film images such as CT, Ultrasound and MRI are read by the radiologist. Plain radiographic images are visualized and preliminarily interpreted by DEAN Ahn with the below findings:    Reviewed radiologist's dictation. Interpretation per the Radiologist below, if available at the time of this note:    CT ABDOMEN PELVIS W IV CONTRAST Additional Contrast? None   Final Result   No acute abnormality of the abdomen or pelvis. Mucous plugging versus aspiration within a few left lower lobe bronchi but no   evidence of pneumonia. The IV contrast infiltrated within the arm upon administration. No   significant contrast present on this exam.  Treatment to be provided in the   emergency room where the patient is located. XR CHEST STANDARD (2 VW)   Final Result   No acute cardiopulmonary disease.                LABS:  Labs Reviewed   CBC WITH AUTO DIFFERENTIAL - Abnormal; Notable for the following components:       Result Value    RBC 5.73 (*)     RDW 15.8 (*)     Lymphocytes # 0.7 (*)     All other components within normal limits    Narrative:     Performed at:  [address] [employer]   Phone [phone]   COMPREHENSIVE METABOLIC PANEL - Abnormal; Notable for the following components:    Anion Gap 17 (*)     Glucose 110 (*)     Total Protein 8.8 (*)     Albumin/Globulin Ratio 0.9 (*)     ALT 9 (*)     All other components within normal limits    Narrative:     Performed at:  [address] [place] [address]   Phone [phone]   URINE RT REFLEX TO CULTURE - Abnormal; Notable for the following components:    Ketones, Urine TRACE (*)     Blood, Urine TRACE (*)     Protein, UA 30 (*)     All other components within normal limits    Narrative:     Performed at:  [address] [place] [address]   Phone [phone]   RAPID INFLUENZA A/B ANTIGENS    Narrative:     Performed at:  [facility]  [address] [place] [address] [place] [address]   Phone [phone]   CULTURE BLOOD #1   CULTURE BLOOD #2   LACTIC ACID, PLASMA    Narrative:     Performed at:  [facility]  [address] [place] [address]   Phone [phone]   TROPONIN    Narrative:     Performed at:  [facility]  [address]  [place] [address]   Phone [phone]   MICROSCOPIC URINALYSIS    Narrative:     Performed at:  [facility]  [address]  [place] [address]   Phone [phone]       All other labs were within normal range or not returned as of this dictation. EMERGENCY DEPARTMENT COURSE and DIFFERENTIAL DIAGNOSIS/MDM:   Vitals:    Vitals:    04/23/19 2017 04/23/19 2032 04/23/19 2047 04/23/19 2151   BP: (!) 233/89 (!) 192/92 (!) 196/97 (!) 196/93   Pulse: 106 101 107 108   Resp: 24 25 17 18   Temp:       TempSrc:       SpO2: 94% 96% 96% 100%     Patient presents with fever, tachycardic at 108 blood pressure is elevated throughout her stay. Presenting physician given a second dose of her home amlodipine. She is not vomiting or having diarrhea. She complains of some abdominal cramping. No acute abdominal abnormality. She does not clinically have compartment syndrome or any complication from the infiltration of her contrast.  She has SIRRS without a definitive source of infection. May be viral.  Given fluids and Tylenol. I'll consult the hospitalist for admission. This patient was seen by the attending physician and they agreed with the assessment and plan. CONSULTS:  IP CONSULT TO HOSPITALIST    PROCEDURES:  None    FINAL IMPRESSION      1. Systemic inflammatory response syndrome (HCC)    2. Uncontrolled hypertension          DISPOSITION/PLAN   DISPOSITION Decision To Admit 04/23/2019 10:14:22 PM      PATIENT REFERRED TO:  No follow-up provider specified.     DISCHARGE MEDICATIONS:  New Prescriptions    No medications on file       (Please note that portions of this note were completed with a voice recognition program.  Efforts were made to edit the dictations but occasionally words are mis-transcribed.)    Tosin Harris, [address] [place], [place]  04/23/19 6685

## 2019-04-24 NOTE — PROGRESS NOTES
hematuria  []  incontinence [] Other:  _____________________________________________________________________  Musculoskeletal ROS:  []  muscle or joint pain  []  stiffness  [] arthritis  []  gout  []  weakness  []  redness  []  swelling  []  instability [] Other:  _____________________________________________________________________  Endocrine ROS:  []  heat/cold intolerance  []  sweating  []  excessive thirst or hunger [] Other:  _____________________________________________________________________  Neurological ROS:  []  Seizures  []  numbness  []  tingling  []  fainting  []  burning  []  tremors [] Other:  _____________________________________________________________________  Psych ROS:  []  Anxiety  []  depression  []  abnormal thoughts [] Other:  _____________________________________________________________________  Dermatological ROS:  []  Rash  []  sores  []  lumps  []  skin changes  []  changes to hair or nails [] Other:  _____________________________________________________________________      Comprehensive ROS negative except as mentioned above. Past Medical History:        Diagnosis Date    CARROLL positive     Arthritis     Bleeding stomach ulcer     Diverticulitis     GERD (gastroesophageal reflux disease)     Hypertension     Stroke ([address] 2002    left weakness     Tobacco abuse        Past Surgical History:        Procedure Laterality Date    CHOLECYSTECTOMY  2014    COLONOSCOPY  2011    HEMORRHOID SURGERY      HYSTERECTOMY, TOTAL ABDOMINAL  1970's    JACQUELYN/BSO for 'tumor'       Allergies:  Pcn [penicillins]    Past medical and surgical history reviewed. Any changes have been noted.      Scheduled and prn Medications:    Scheduled Meds:   amLODIPine  10 mg Oral Daily    lisinopril  10 mg Oral Daily    pantoprazole  40 mg Oral QAM AC    sodium chloride flush  10 mL Intravenous 2 times per day    enoxaparin  40 mg Subcutaneous Daily    cefTRIAXone (ROCEPHIN) IV  1 g Intravenous Q24H    potassium chloride  20 mEq Oral BID WC     Continuous Infusions:  PRN Meds:.cyclobenzaprine, traZODone, sodium chloride flush, magnesium hydroxide, ondansetron, acetaminophen, hydrALAZINE    PHYSICAL EXAM:  BP (!) 157/76   Pulse 84   Temp 98.7 °F (37.1 °C) (Oral)   Resp 18   Wt 116 lb 13.5 oz (53 kg)   SpO2 94%   BMI 20.70 kg/m²       Intake/Output Summary (Last 24 hours) at 4/24/2019 1003  Last data filed at 4/24/2019 0243  Gross per 24 hour   Intake 790 ml   Output --   Net 790 ml       General: Alert and oriented. Resting in bed in no apparent distress. HEENT: Normocephalic. Atraumatic. Pupils equal and reactive. EOM intact. Oral mucosa pink/moist/intact. Neck: Supple. Symmetrical. Trachea midline. Lungs: Clear to auscultation bilaterally. Respirations even and unlabored. Chest: Exam unremarkable. Cardiac: S1/S2 noted. Regular Rhythm and rate. Abdomen/GI: Soft. Non-tender. Non-distended. BS+. Extremities: PP+. Atraumatic. No redness/cyanosis/edema noted. Brisk cap refill. Skin: Dry and intact. No lesions noted. Neuro: Grossly intact. No focal deficits noted. LABS:    Lab Results   Component Value Date    WBC 5.0 04/24/2019    HGB 13.5 04/24/2019    HCT 40.9 04/24/2019    MCV 81.1 04/24/2019     04/24/2019    LYMPHOPCT 18.5 04/24/2019    RBC 5.04 04/24/2019    MCH 26.7 04/24/2019    MCHC 33.0 04/24/2019    RDW 15.7 (H) 04/24/2019       Lab Results   Component Value Date    CREATININE 0.9 04/24/2019    BUN 9 04/24/2019     04/24/2019    K 3.1 (L) 04/24/2019     04/24/2019    CO2 26 04/24/2019       Lab Results   Component Value Date    MG 1.90 04/24/2019       Lab Results   Component Value Date    ALT 9 (L) 04/23/2019    AST 21 04/23/2019    ALKPHOS 117 04/23/2019    BILITOT 0.4 04/23/2019        No flowsheet data found. Imaging:  CT ABDOMEN PELVIS W IV CONTRAST Additional Contrast? None   Final Result   No acute abnormality of the abdomen or pelvis.       Mucous plugging versus aspiration within a few left lower lobe bronchi but no   evidence of pneumonia. The IV contrast infiltrated within the arm upon administration. No   significant contrast present on this exam.  Treatment to be provided in the   emergency room where the patient is located. XR CHEST STANDARD (2 VW)   Final Result   No acute cardiopulmonary disease. Assessment & Plan:        HTN Urgency   BP improving  Blood pressure should be reduced by 25% of MAP over hours to days. Initiate Lisinopril, Hctz, procardia. PRN hydralazine  UA reviewed. + proteinuria   No neurological deficits noted. Continue to monitor     Febrile illness  Likely 2/2 bronchitis  Flu swab negative  Check resp viral panel     Body mass index is 20.7 kg/m². The patient and / or the family were informed of the results of any tests, a time was given to answer questions, a plan was proposed and they agreed with plan.     DVT prophylaxis: [x] Lovenox  [] SQ Heparin  [] SCDs because of  [] warfarin/oral direct thrombin inhibitor [] Encourage ambulation    GI prophylaxis: [x] PPI/[id number]  [] not indicated    Probiotic if on abx: [x] Yes [] No [] Not Indicated    Diet: DIET LOW SODIUM 2 GM;    Consults:  IP CONSULT TO HOSPITALIST    Disposition:  [x] Home  [] Home with home health [] Rehab [] Psych [] SNF  [] LTAC  [] Long term nursing home or group home [] Transfer to ICU  [] Transfer to PCU [] Other:    Code Status: Full Code    ELOS: TBD      AMRITA Bryant NP  04/24/19

## 2019-04-25 VITALS
DIASTOLIC BLOOD PRESSURE: 81 MMHG | BODY MASS INDEX: 19.76 KG/M2 | OXYGEN SATURATION: 96 % | HEIGHT: 64 IN | TEMPERATURE: 98.7 F | RESPIRATION RATE: 16 BRPM | SYSTOLIC BLOOD PRESSURE: 135 MMHG | WEIGHT: 115.74 LBS | HEART RATE: 83 BPM

## 2019-04-25 LAB
ANION GAP SERPL CALCULATED.3IONS-SCNC: 16 MMOL/L (ref 3–16)
BASOPHILS ABSOLUTE: 0.1 K/UL (ref 0–0.2)
BASOPHILS RELATIVE PERCENT: 0.7 %
BUN BLDV-MCNC: 15 MG/DL (ref 7–20)
CALCIUM SERPL-MCNC: 9.4 MG/DL (ref 8.3–10.6)
CHLORIDE BLD-SCNC: 100 MMOL/L (ref 99–110)
CO2: 24 MMOL/L (ref 21–32)
CREAT SERPL-MCNC: 1 MG/DL (ref 0.6–1.2)
EKG ATRIAL RATE: 102 BPM
EKG DIAGNOSIS: NORMAL
EKG P AXIS: 79 DEGREES
EKG P-R INTERVAL: 194 MS
EKG Q-T INTERVAL: 352 MS
EKG QRS DURATION: 82 MS
EKG QTC CALCULATION (BAZETT): 458 MS
EKG R AXIS: -10 DEGREES
EKG T AXIS: -3 DEGREES
EKG VENTRICULAR RATE: 102 BPM
EOSINOPHILS ABSOLUTE: 0.2 K/UL (ref 0–0.6)
EOSINOPHILS RELATIVE PERCENT: 2.3 %
GFR AFRICAN AMERICAN: >60
GFR NON-AFRICAN AMERICAN: 53
GLUCOSE BLD-MCNC: 103 MG/DL (ref 70–99)
HCT VFR BLD CALC: 43.3 % (ref 36–48)
HEMOGLOBIN: 14.3 G/DL (ref 12–16)
LYMPHOCYTES ABSOLUTE: 1.2 K/UL (ref 1–5.1)
LYMPHOCYTES RELATIVE PERCENT: 15.2 %
MCH RBC QN AUTO: 26.5 PG (ref 26–34)
MCHC RBC AUTO-ENTMCNC: 33 G/DL (ref 31–36)
MCV RBC AUTO: 80.5 FL (ref 80–100)
MONOCYTES ABSOLUTE: 0.9 K/UL (ref 0–1.3)
MONOCYTES RELATIVE PERCENT: 11.5 %
NEUTROPHILS ABSOLUTE: 5.4 K/UL (ref 1.7–7.7)
NEUTROPHILS RELATIVE PERCENT: 70.3 %
ORGANISM: ABNORMAL
PDW BLD-RTO: 15.8 % (ref 12.4–15.4)
PLATELET # BLD: 321 K/UL (ref 135–450)
PMV BLD AUTO: 6.7 FL (ref 5–10.5)
POTASSIUM REFLEX MAGNESIUM: 3.6 MMOL/L (ref 3.5–5.1)
PROCALCITONIN: 0.18 NG/ML (ref 0–0.15)
RBC # BLD: 5.38 M/UL (ref 4–5.2)
REPORT: NORMAL
RESPIRATORY PANEL PCR: ABNORMAL
RESPIRATORY PANEL PCR: ABNORMAL
SODIUM BLD-SCNC: 140 MMOL/L (ref 136–145)
WBC # BLD: 7.7 K/UL (ref 4–11)

## 2019-04-25 PROCEDURE — 6370000000 HC RX 637 (ALT 250 FOR IP): Performed by: NURSE PRACTITIONER

## 2019-04-25 PROCEDURE — 97530 THERAPEUTIC ACTIVITIES: CPT

## 2019-04-25 PROCEDURE — 97166 OT EVAL MOD COMPLEX 45 MIN: CPT

## 2019-04-25 PROCEDURE — 97162 PT EVAL MOD COMPLEX 30 MIN: CPT | Performed by: PHYSICAL THERAPIST

## 2019-04-25 PROCEDURE — 36415 COLL VENOUS BLD VENIPUNCTURE: CPT

## 2019-04-25 PROCEDURE — 80048 BASIC METABOLIC PNL TOTAL CA: CPT

## 2019-04-25 PROCEDURE — 6370000000 HC RX 637 (ALT 250 FOR IP): Performed by: INTERNAL MEDICINE

## 2019-04-25 PROCEDURE — 6360000002 HC RX W HCPCS: Performed by: INTERNAL MEDICINE

## 2019-04-25 PROCEDURE — 97535 SELF CARE MNGMENT TRAINING: CPT

## 2019-04-25 PROCEDURE — 94760 N-INVAS EAR/PLS OXIMETRY 1: CPT

## 2019-04-25 PROCEDURE — 85025 COMPLETE CBC W/AUTO DIFF WBC: CPT

## 2019-04-25 PROCEDURE — 97116 GAIT TRAINING THERAPY: CPT | Performed by: PHYSICAL THERAPIST

## 2019-04-25 PROCEDURE — 84145 PROCALCITONIN (PCT): CPT

## 2019-04-25 PROCEDURE — 2580000003 HC RX 258: Performed by: INTERNAL MEDICINE

## 2019-04-25 PROCEDURE — 97530 THERAPEUTIC ACTIVITIES: CPT | Performed by: PHYSICAL THERAPIST

## 2019-04-25 RX ORDER — NIFEDIPINE 30 MG/1
30 TABLET, FILM COATED, EXTENDED RELEASE ORAL DAILY
Qty: 30 TABLET | Refills: 0 | Status: SHIPPED | OUTPATIENT
Start: 2019-04-25

## 2019-04-25 RX ORDER — NIFEDIPINE 30 MG/1
30 TABLET, EXTENDED RELEASE ORAL 2 TIMES DAILY
Status: DISCONTINUED | OUTPATIENT
Start: 2019-04-25 | End: 2019-04-25 | Stop reason: HOSPADM

## 2019-04-25 RX ORDER — HYDROCHLOROTHIAZIDE 25 MG/1
25 TABLET ORAL DAILY
Qty: 30 TABLET | Refills: 0 | Status: ON HOLD | OUTPATIENT
Start: 2019-04-26 | End: 2021-01-26 | Stop reason: HOSPADM

## 2019-04-25 RX ADMIN — ENOXAPARIN SODIUM 40 MG: 40 INJECTION SUBCUTANEOUS at 09:06

## 2019-04-25 RX ADMIN — LISINOPRIL 10 MG: 10 TABLET ORAL at 09:06

## 2019-04-25 RX ADMIN — TRAZODONE HYDROCHLORIDE 50 MG: 50 TABLET ORAL at 01:34

## 2019-04-25 RX ADMIN — PANTOPRAZOLE SODIUM 40 MG: 40 TABLET, DELAYED RELEASE ORAL at 06:45

## 2019-04-25 RX ADMIN — POTASSIUM CHLORIDE 20 MEQ: 20 TABLET, EXTENDED RELEASE ORAL at 09:06

## 2019-04-25 RX ADMIN — CYCLOBENZAPRINE HYDROCHLORIDE 10 MG: 10 TABLET, FILM COATED ORAL at 09:10

## 2019-04-25 RX ADMIN — ACETAMINOPHEN 650 MG: 325 TABLET ORAL at 09:10

## 2019-04-25 RX ADMIN — HYDROCHLOROTHIAZIDE 25 MG: 25 TABLET ORAL at 09:06

## 2019-04-25 RX ADMIN — Medication 1 CAPSULE: at 09:07

## 2019-04-25 RX ADMIN — NIFEDIPINE 30 MG: 30 TABLET, FILM COATED, EXTENDED RELEASE ORAL at 09:06

## 2019-04-25 RX ADMIN — CEFTRIAXONE 1 G: 1 INJECTION, POWDER, FOR SOLUTION INTRAMUSCULAR; INTRAVENOUS at 01:34

## 2019-04-25 ASSESSMENT — PAIN DESCRIPTION - PROGRESSION: CLINICAL_PROGRESSION: NOT CHANGED

## 2019-04-25 ASSESSMENT — PAIN DESCRIPTION - ORIENTATION: ORIENTATION: LEFT

## 2019-04-25 ASSESSMENT — PAIN DESCRIPTION - FREQUENCY: FREQUENCY: INTERMITTENT

## 2019-04-25 ASSESSMENT — PAIN DESCRIPTION - DESCRIPTORS: DESCRIPTORS: CRAMPING

## 2019-04-25 ASSESSMENT — PAIN SCALES - GENERAL
PAINLEVEL_OUTOF10: 0
PAINLEVEL_OUTOF10: 4
PAINLEVEL_OUTOF10: 1

## 2019-04-25 ASSESSMENT — PAIN DESCRIPTION - PAIN TYPE: TYPE: CHRONIC PAIN

## 2019-04-25 ASSESSMENT — PAIN DESCRIPTION - LOCATION: LOCATION: LEG

## 2019-04-25 ASSESSMENT — PAIN - FUNCTIONAL ASSESSMENT: PAIN_FUNCTIONAL_ASSESSMENT: PREVENTS OR INTERFERES SOME ACTIVE ACTIVITIES AND ADLS

## 2019-04-25 ASSESSMENT — PAIN DESCRIPTION - ONSET: ONSET: SUDDEN

## 2019-04-25 NOTE — PROGRESS NOTES
Physical Therapy    Facility/Department: [facility] 5X PROGRESSIVE CARE  Initial Assessment/Discharge Note    NAME: Yaima Cabrera  : 1940  MRN: [id number]    Date of Service: 2019    Discharge Recommendations:  Patient would benefit from continued therapy after discharge, 2-3 sessions per week(pt would benefit from initial 24/7 assist (assist during waking hours) to ensure safe transition to home)   PT Equipment Recommendations  Equipment Needed: No  Yaima Cabrera scored a 21/24 on the AM-PAC short mobility form. Current research shows that an AM-PAC score of 18 or greater is typically associated with a discharge to the patient's home setting. Based on the patients AM-PAC score and their current functional mobility deficits, it is recommended that the patient have front loaded PT for safety then 2-3 sessions per week of Physical Therapy at d/c to increase the patients independence. Pt has discharge orders therefore will not  in acute PT  Assessment   Body structures, Functions, Activity limitations: Decreased functional mobility   Assessment: pt is a 78 yo female who was adm to hosp with fatigue, hypertensive urgency and Rhinovirus; pt is slightly below her stated baseline however feel pt will be safe to return home however may need increased assist at home as pt reports being alone when her son is at work. pt will also benefit from home PT front loaded 5x/wk initially for safety issues  Prognosis: Good  Decision Making: Medium Complexity  Patient Education: role and purpose of PT; reviewed safety issues for home  Barriers to Learning: none  REQUIRES PT FOLLOW UP: No  Activity Tolerance  Activity Tolerance: Patient Tolerated treatment well       Patient Diagnosis(es): The primary encounter diagnosis was Systemic inflammatory response syndrome ([facility] [address] A diagnosis of Uncontrolled hypertension was also pertinent to this visit.      has a past medical history of CARROLL positive, Arthritis, Bleeding stomach ulcer, Diverticulitis, GERD (gastroesophageal reflux disease), Hypertension, Stroke [facility]), and Tobacco abuse.   has a past surgical history that includes Colonoscopy (2011); Hysterectomy, total abdominal (1970's); Cholecystectomy (2014); and Hemorrhoid surgery. Restrictions  Restrictions/Precautions  Restrictions/Precautions: Isolation(droplet precautions)  Vision/Hearing  Vision: Within Functional Limits  H[facility]: [facility]     Subjective  General  Chart Reviewed: Yes  Patient assessed for rehabilitation services?: Yes  Additional Pertinent Hx: per PA note from 4-24: The patient is a 77year old female, who presented to [facility] with complaints of fatigue. She had been feeling unwell for several days and noted fevers up to 101.7. She presented to [facility] for further workup and treatment and was noted to be extremely hypertensive. She was diagnosed with hypertensive urgency as well as bronchitis and was admitted for further workup and treatment.   Pt diagnosed with Rhinovirus and currently in droplet precautions  Response To Previous Treatment: Not applicable  Family / Caregiver Present: No  Follows Commands: Within Functional Limits  Subjective  Subjective: pt agreeable to therapy; denies any pain  Pain Screening  Patient Currently in Pain: Denies          Orientation  Orientation  Overall Orientation Status: Within Functional Limits  Social/Functional History  Social/Functional History  Lives With: Son  Type of Home: Apartment  Home Layout: One level, Able to Live on Main level with bedroom/bathroom  Home Access: Stairs to enter with rails  Entrance Stairs - Number of Steps: 6 with 1 rail  Entrance Stairs - Rails: Left  Bathroom Shower/Tub: Tub/Shower unit, Shower chair with back  Bathroom Toilet: Standard  Bathroom Equipment: Grab bars around toilet  Bathroom Accessibility: Accessible  Home Equipment: 4 wheeled walker, Cane, Rolling walker  ADL Assistance: Needs assistance  Homemaking Assistance: (aides clean and son gets grocery)  Ambulation Assistance: Needs assistance(needs assist PRN; uses RW)  Transfer Assistance: Needs assistance  Active : No  Additional Comments: 4x a week someone cleans; 3x a week someone grocery shopping/cooking prn; Mod A dressing; ambulate with cane while also holding onto walls/furniture; Min A bathing; son helps often/harris while daughter helps occassionally  Cognition   Cognition  Overall Cognitive Status: WFL    Objective          AROM RLE (degrees)  RLE AROM: WFL  AROM LLE (degrees)  LLE AROM : WFL  Strength RLE  Comment: functionally poor  Strength LLE  Comment: functionally poor     Sensation  Overall Sensation Status: WNL  Bed mobility  Comment: pt up in chair at beginning and end of session  Transfers  Sit to Stand: Stand by assistance  Stand to sit: Stand by assistance  Ambulation  Ambulation?: Yes  Ambulation 1  Surface: level tile  Device: Rolling Walker  Assistance: Stand by assistance  Quality of Gait: pt takes small slow steps, easily distracted; no overt LOB  Distance: 25'  Stairs/Curb  Stairs?: No     Balance  Comments: sup/MI sitting balance; SBA for standing with RW        Plan   Safety Devices  Type of devices: Call light within reach, Chair alarm in place, Left in chair, Nurse notified      AM-PAC Score  AM-PAC Inpatient Mobility Raw Score : 21  AM-PAC Inpatient T-Scale Score : 50.25  Mobility Inpatient CMS 0-100% Score: 28.97  Mobility Inpatient CMS G-Code Modifier : CJ          Goals  Patient Goals   Patient goals : pt being discharged today       Therapy Time   Individual Concurrent Group Co-treatment   Time In 1010         Time Out 1103         Minutes 53               If patient is discharged prior to the next physical therapy visit;  Please see last written PT note for discharge status.     ROXY MCRAE, PT

## 2019-04-25 NOTE — ED PROVIDER NOTES
Attending Supervisory Note/Shared Visit   I have personally performed a face to face diagnostic evaluation on this patient. I have reviewed the mid-levels findings and agree. History and Exam by me shows a well-appearing female in no acute distress. Patient states that she's been feeling fatigued the last few days and has noticed fevers at home up to 101.7. Patient states otherwise she has not had any difficulties breathing, chest pains abdominal pain nausea vomiting or changes in bowel or urine function. And states she has had a nonproductive cough. On arrival to the ED the patient was tachycardic and febrile and hypertensive. patient states she hasn't taken her hypertensive medications. Patient's presentation concerning for SIRS. Patient's laboratory was unremarkable her initial lactate was not elevated. There were no emergent laboratory abnormalities on CMP. Influenza was negative. Patient's urine did not appear to be infected. Chest x-ray did not show cardiopulmonary disease. CT abdomen was obtained the patient's IV did infiltrate. This did not demonstrate any intra-abdominal abnormalities that show aspiration versus mucus plugging in the patient's lower lung fields. Patient will be admitted to the hospital due to SIRS and for further medical management evaluation. I agree with the MAKAYLA plan of care. Please MAKAYLA Note for full ED course and final disposition. Disposition:  1. Systemic inflammatory response syndrome (HCC)    2.  Uncontrolled hypertension          Larissa Sarkar MD  Attending Emergency Physician        Larissa Sarkar MD  04/24/19 2013

## 2019-04-25 NOTE — PLAN OF CARE
Problem: Falls - Risk of:  Goal: Will remain free from falls  Description  Will remain free from falls  Outcome: Ongoing  Goal: Absence of physical injury  Description  Absence of physical injury  Outcome: Ongoing  Note:   Fall risk precautions in place. Bed in lowest position with wheels locked,bed alarm in place and activated,non-skid socks on pt, fall risk ID on pt, call light in reach, will continue to monitor. Problem: Risk for Impaired Skin Integrity  Goal: Tissue integrity - skin and mucous membranes  Description  Structural intactness and normal physiological function of skin and  mucous membranes. Outcome: Ongoing  Note:   Skin assessment completed every shift. Pt assessed for incontinence, appropriate barrier cream applied prn. Pt turned every 2 hours.          Problem: Pain:  Goal: Pain level will decrease  Description  Pain level will decrease  Outcome: Ongoing  Goal: Control of acute pain  Description  Control of acute pain  Outcome: Ongoing  Goal: Control of chronic pain  Description  Control of chronic pain  Outcome: Ongoing

## 2019-04-25 NOTE — DISCHARGE SUMMARY
Hospital Medicine Discharge Summary      Patient ID: Jeet Brooke      Patient's PCP: Esau Dudley Date: 4/23/2019     Discharge Date: 4/25/19    Admitting Physician: Kim Alvarez MD     Discharge Provider: AMRITA Ruff NP     Discharge Diagnoses: Active Hospital Problems    Diagnosis Date Noted    Hypertensive urgency [I16.0] 04/23/2019     Disposition:  [] Home  [x] Home with home health [] Rehab [] Psych [] SNF  [] LTAC  [] Long term nursing home or group home [] Transfer to ICU  [] Transfer to PCU [] Other:    Discharge Instructions/Follow-up:       Please call and schedule an appointment with your Primary Care Provider Dr. Flaquita Rain at [facility] for a follow up visit within 1 week. - Please continue to monitor BP and adjust medications as needed    [facility] for continued therapies after discharge. Take medications as prescribed. Return to the emergency room for fever, cough, chest pain or worsening symptoms. PCP/SNF to follow up: As above    Discharge Condition: Stable      Code Status:  Full Code     Activity: activity as tolerated    Diet: DIET LOW SODIUM 2 GM;      Discharge Medications:     Current Discharge Medication List           Details   NIFEdipine (ADALAT CC) 30 MG extended release tablet Take 1 tablet by mouth daily  Qty: 30 tablet, Refills: 0      hydrochlorothiazide (HYDRODIURIL) 25 MG tablet Take 1 tablet by mouth daily  Qty: 30 tablet, Refills: 0              Details   traZODone (DESYREL) 50 MG tablet Take 1 tablet by mouth nightly as needed for Sleep  Qty: 30 tablet, Refills: 0      docusate sodium (COLACE, DULCOLAX) 100 MG CAPS Take 100 mg by mouth 2 times daily as needed for Constipation      lisinopril (PRINIVIL;ZESTRIL) 10 MG tablet TAKE ONE TABLET BY MOUTH DAILY      cyclobenzaprine (FLEXERIL) 10 MG tablet Take 1 tablet by mouth 3 times daily as needed for Muscle spasms  Qty: 15 tablet, Refills: 0      polyethylene glycol (GLYCOLAX) packet Take 17 g by mouth daily as needed for Constipation      senna-docusate (PERICOLACE) 8.6-50 MG per tablet Take 1 tablet by mouth daily as needed for Constipation      calcium-vitamin D (OSCAL-500) 500-200 MG-UNIT per tablet Take 1 tablet by mouth daily. diphenoxylate-atropine (LOMOTIL) 2.5-0.025 MG per tablet Take 1 tablet by mouth 4 times daily as needed for Diarrhea. Multiple Vitamins-Minerals (THERA-M) TABS Take 1 tablet by mouth daily. Associated Diagnoses: Abdominal pain, other specified site; Pancreatic duct dilated; Pancreatic neoplasm; Pancreatic mass             The patient was seen and examined on day of discharge and this discharge summary is in conjunction with any daily progress note from day of discharge. Hospital Course: The patient is a 77year old female, who presented to [facility] with complaints of fatigue. She had been feeling unwell for several days and noted fevers up to 101.7. She presented to [facility] for further workup and treatment and was noted to be extremely hypertensive. She was diagnosed with hypertensive urgency as well as bronchitis and was admitted for further workup and treatment. The patient was found to be positive for human rhinovirus. Blood pressure medications were adjusted to include HCTZ, lisinopril, procardia. BP improved. PT/OT were consulted to evaluate for continued therapies after discharge. At this time, the patient is able to discharge to home in stable condition with resumption of prior [facility]. She will need to f/u within 1 week with her PCP. Exam:     BP (!) 142/79   Pulse 86   Temp 99.3 °F (37.4 °C) (Oral)   Resp 18   Ht 5' 4\" (1.626 m)   Wt 115 lb 11.9 oz (52.5 kg)   SpO2 95%   BMI 19.87 kg/m²     General: Alert and oriented. Resting in bed in no apparent distress. HEENT: Normocephalic. Atraumatic. Pupils equal and reactive. EOM intact. Oral mucosa pink/moist/intact. Neck: Supple. Symmetrical. Trachea midline.   Lungs: Clear to auscultation bilaterally. Respirations even and unlabored. Chest: Exam unremarkable. Cardiac: S1/S2 noted. Regular Rhythm and rate. Abdomen/GI: Soft. Non-tender. Non-distended. BS+. Extremities: PP+. Atraumatic. No redness/cyanosis/edema noted. Brisk cap refill. Skin: Dry and intact. No lesions noted. Neuro: Grossly intact. No focal deficits noted. Consults:     IP CONSULT TO HOSPITALIST    Significant Diagnostic Studies:       CT ABDOMEN PELVIS W IV CONTRAST Additional Contrast? None   Final Result   No acute abnormality of the abdomen or pelvis. Mucous plugging versus aspiration within a few left lower lobe bronchi but no   evidence of pneumonia. The IV contrast infiltrated within the arm upon administration. No   significant contrast present on this exam.  Treatment to be provided in the   emergency room where the patient is located. XR CHEST STANDARD (2 VW)   Final Result   No acute cardiopulmonary disease. Organism Abnormal  04/24/2019  9:00 PM [address] [place] [address]   Human Rhinovirus/Enterovirus by PCR    Respiratory Panel PCR 04/24/2019  9:00 PM [address]   See additional report for complete Respiratory Pathogen Panel        Labs: For convenience and continuity at follow-up the following most recent labs are provided:    CBC:    Lab Results   Component Value Date    WBC 7.7 04/25/2019    HGB 14.3 04/25/2019    HCT 43.3 04/25/2019     04/25/2019       Renal:    Lab Results   Component Value Date     04/25/2019    K 3.6 04/25/2019     04/25/2019    CO2 24 04/25/2019    BUN 15 04/25/2019    CREATININE 1.0 04/25/2019    CALCIUM 9.4 04/25/2019    PHOS 2.3 06/28/2016       No future appointments. Time Spent on discharge is more than 45 minutes in the examination, evaluation, counseling and review of medications and discharge plan.      RX monitoring reviewed N/A    Signed:    AMRITA Mace NP   4/25/2019      Thank you JUSTIN Bernadine Dinero for the opportunity to be involved in this patient's care. If you have any questions or concerns please feel free to contact me at [phone].

## 2019-04-25 NOTE — PROGRESS NOTES
does not have 24hr supervision per pt. Pt will benefit from skilled OT services at this time and after D/C from hospital.   Prognosis: Good  Decision Making: Medium Complexity  History: hyperlipidemia, stroke and hypertension. Exam: role of OT  Assistance / Modification: RW  Patient Education: role of OT, safety with mobility/ADLs  REQUIRES OT FOLLOW UP: Yes  Activity Tolerance  Activity Tolerance: Patient Tolerated treatment well  Safety Devices  Safety Devices in place: Yes  Type of devices: Nurse notified;Gait belt;Call light within reach; Chair alarm in place; Left in chair           Patient Diagnosis(es): The primary encounter diagnosis was Systemic inflammatory response syndrome ([facility] [address] A diagnosis of Uncontrolled hypertension was also pertinent to this visit. has a past medical history of CARROLL positive, Arthritis, Bleeding stomach ulcer, Diverticulitis, GERD (gastroesophageal reflux disease), Hypertension, Stroke ([facility] [address] and Tobacco abuse.   has a past surgical history that includes Colonoscopy (2011); Hysterectomy, total abdominal (1970's); Cholecystectomy (2014); and Hemorrhoid surgery. Restrictions  Restrictions/Precautions  Restrictions/Precautions: Isolation(droplet precautions)    Subjective   General  Chart Reviewed: Yes  Patient assessed for rehabilitation services?: Yes  Additional Pertinent Hx: stroke  Family / Caregiver Present: No  Referring Practitioner: Lashawn Graham  Subjective  Subjective: Pt up in chair and agreeable to OT service. Pt reports no pain prior to therapy session. General Comment  Comments: okay for therapy per RN.    Pain Assessment  Pain Assessment: 0-10  Pain Level: 1  Pain Type: Chronic pain  Pain Location: Leg  Pain Orientation: Left  Pain Descriptors: Cramping  Pain Frequency: Intermittent  Pain Onset: Sudden  Clinical Progression: Not changed  Functional Pain Assessment: Prevents or interferes some active activities and ADLs  Non-Pharmaceutical Pain Intervention(s): Rest;Repositioned  Response to Pain Intervention: Patient Satisfied  Social/Functional History  Social/Functional History  Lives With: Son  Type of Home: Apartment  Home Layout: One level, Able to Live on Main level with bedroom/bathroom  Home Access: Stairs to enter with rails  Entrance Stairs - Number of Steps: 6 with 1 rail  Entrance Stairs - Rails: Left  Bathroom Shower/Tub: Tub/Shower unit, Shower chair with back  Bathroom Toilet: Standard  Bathroom Equipment: Grab bars around toilet  Bathroom Accessibility: Accessible  Home Equipment: 4 wheeled walker, Cane, Rolling walker  ADL Assistance: Needs assistance  Homemaking Assistance: (aides clean and son gets grocery)  Ambulation Assistance: Needs assistance(needs assist PRN; uses RW)  Transfer Assistance: Needs assistance  Active : No  Additional Comments: 4x a week someone cleans; 3x a week someone grocery shopping/cooking prn; Mod A dressing; ambulate with cane while also holding onto walls/furniture; Min A bathing; son helps often/harris while daughter helps occassionally       Objective   Vision: Within Functional Limits  Hearing: Within functional limits(slighlty [place])    Orientation  Overall Orientation Status: Within Functional Limits     Balance  Sitting Balance: Independent  Standing Balance: Stand by assistance(RW)  Functional Mobility  Functional - Mobility Device: Rolling Walker  Activity: Other(household distances)  Assist Level: Stand by assistance  Functional Mobility Comments: no LOB  ADL  Feeding: Independent  Grooming: Setup(seated)  LE Dressing: Minimal assistance  Additional Comments: anticipate pt completing toileting with SBA, LB dressing Min A (pt reports she has assist for socks/shoes as needed), SBA bathing, SBA UB dressing  Tone RUE  RUE Tone: Normotonic  Tone LUE  LUE Tone: Normotonic  Coordination  Movements Are Fluid And Coordinated: Yes     Bed mobility  Rolling to Left: Unable to assess  Rolling to Right: Unable to assess  Supine to Sit: Unable to assess  Sit to Supine: Unable to assess  Scooting: Unable to assess  Transfers  Sit to stand: Stand by assistance  Stand to sit: Stand by assistance  Transfer Comments: to/from RW; pt reports she has lift chair at home     Cognition  Overall Cognitive Status: WFL        Sensation  Overall Sensation Status: WNL        LUE AROM (degrees)  LUE AROM : WFL  RUE AROM (degrees)  RUE AROM : WFL  LUE Strength  Gross LUE Strength: WFL  L Hand Grasp: 4-/5  RUE Strength  Gross RUE Strength: WFL  R Hand Grasp: 4-/5                   Plan   Plan  Times per week: 3-5x  Current Treatment Recommendations: Strengthening, Functional Mobility Training, Gait Training, Patient/Caregiver Education & Training, Endurance Training, Equipment Evaluation, Education, & procurement, Self-Care / ADL, Safety Education & Training      AM-PAC Score        AM-PAC Inpatient Daily Activity Raw Score: 21  AM-PAC Inpatient ADL T-Scale Score : 44.27  ADL Inpatient CMS 0-100% Score: 32.79  ADL Inpatient CMS G-Code Modifier : CJ    Goals  Short term goals  Time Frame for Short term goals: prior to D/C  Short term goal 1: complete functional mobility and transfers Mod Ind  Short term goal 2: complete toileting Mod Ind  Short term goal 3: complete grooming in stance at sink Mod Ind  Short term goal 4: complete LB dressing Mod Ind  Long term goals  Time Frame for Long term goals : STG=LTG  Patient Goals   Patient goals : return home and continue therapy       Therapy Time   Individual Concurrent Group Co-treatment   Time In 1010         Time Out 1055         Minutes 45         Timed Code Treatment Minutes: 30 Minutes(15 minute eval)     This note to serve as d/c summary should pt d/c prior to next session.      Lauerano Stevenson OTR/L

## 2019-04-25 NOTE — CARE COORDINATION
D/c order acknowledged. Sw met with patient, she is aware of d/c. Family will transport her home. She feels that she is safe enough to return home and has the available assist(aides and her son Arch Hinders), that she needs. [facility](skilled) is aware of d/c, faxed orders to [phone]. No other needs expressed at this time.      Suzette Gutierrez, [facility]  [phone]  4/25/19 at 11:22 AM

## 2019-04-25 NOTE — PROGRESS NOTES
Discharge instructions reviewed and discussed with patient and daughter. Patient verbalized understanding of all medications and received new medications from pharmacy before leaving.

## 2019-04-25 NOTE — DISCHARGE INSTR - COC
Continuity of Care Form    Patient Name: Jeet Brooke   :  1940  MRN:  [id number]    Admit date:  2019  Discharge date:  19    Code Status Order: Full Code   Advance Directives:   Advance Care Flowsheet Documentation     Date/Time Healthcare Directive Type of Healthcare Directive Copy in [address] Agent's Name Healthcare Agent's Phone Number    [phone]  No, patient does not have an advance directive for healthcare treatment -- -- -- -- --          Admitting Physician:  Kim Alvarez MD  PCP: [address] [place] [address]    Discharging Nurse: [facility] Unit/Room#: C5C-1748/5121-01  Discharging Unit Phone Number: [phone]    Emergency Contact:   Extended Emergency Contact Information  Primary Emergency Contact: [address]  Phone: [phone]  Relation: Child  Secondary Emergency Contact: Elzbieta Sewell Do [address] Phone: [phone]  Relation: Child    Past Surgical History:  Past Surgical History:   Procedure Laterality Date    CHOLECYSTECTOMY      COLONOSCOPY      HEMORRHOID SURGERY      HYSTERECTOMY, TOTAL ABDOMINAL  1970's    JACQUELYN/BSO for 'tumor'       Immunization History:   Immunization History   Administered Date(s) Administered    Influenza Virus Vaccine 2014, 2014, 2017    Influenza, High Dose (Fluzone 65 yrs and older) 10/17/2017    Influenza, Kermitt Plaster, 3 yrs and older, IM, PF (Fluzone 3 yrs and older or Afluria 5 yrs and older) 2016    Pneumococcal 13-valent Conjugate ([id number]) 10/17/2017    Pneumococcal Polysaccharide ([id number]) 2005, 2014       Active Problems:  Patient Active Problem List   Diagnosis Code    Age-related osteoporosis with current pathol fracture of vertebra ([facility] [address] M80.08XA    Acute right-sided low back pain with sciatica M54.40    History of CVA with residual deficit I69.30    Tobacco abuse Z72.0    Multiple closed fractures of ribs S22.49XA    Essential hypertension, benign I10    Generalized weakness R53.1    Hyperlipidemia E78.5    Fall against object W18.00XA    Hypokalemia E87.6    Severe protein-calorie malnutrition Caridad Clare: less than 60% of standard weight) ([facility] [address] E43    Hypertensive urgency I16.0       Isolation/Infection:   Isolation          Droplet        Patient Infection Status     Infection Encounter Level? Onset Date Added Added By Resolved Resolved By Review Date    Rhinovirus Yes 04/24/19 04/25/19 Respiratory Panel, Film Array   04/27/19          Nurse Assessment:  Last Vital Signs: BP (!) 142/79   Pulse 86   Temp 99.3 °F (37.4 °C) (Oral)   Resp 18   Ht 5' 4\" (1.626 m)   Wt 115 lb 11.9 oz (52.5 kg)   SpO2 95%   BMI 19.87 kg/m²     Last documented pain score (0-10 scale): Pain Level: 1  Last Weight:   Wt Readings from Last 1 Encounters:   04/25/19 115 lb 11.9 oz (52.5 kg)     Mental Status:  oriented and alert    IV Access:  - None    Nursing Mobility/ADLs:  Walking   Assisted  Transfer  Assisted  Bathing  Assisted  Dressing  Assisted  Toileting  Assisted  Feeding  Independent  Med Admin  Assisted  Med Delivery   whole    Wound Care Documentation and Therapy:        Elimination:  Continence:   · Bowel: No  · Bladder: No  Urinary Catheter: None   Colostomy/Ileostomy/Ileal Conduit: No       Date of Last BM: 4/24/19    Intake/Output Summary (Last 24 hours) at 4/25/2019 0953  Last data filed at 4/25/2019 0903  Gross per 24 hour   Intake 1100 ml   Output 0 ml   Net 1100 ml     I/O last 3 completed shifts: In: 18 [P.O.:800; I.V.:10; IV Piggyback:50]  Out: 0     Safety Concerns:      At Risk for Falls    Impairments/Disabilities:      Hearing    Nutrition Therapy:  Current Nutrition Therapy:   - Oral Diet:  Low Sodium (2gm)    Routes of Feeding: Oral  Liquids: No Restrictions  Daily Fluid Restriction: no  Last Modified Barium Swallow with Video (Video Swallowing Test): not done    Treatments at the Time of Hospital Discharge:   Respiratory Treatments: no  Oxygen Therapy:  is not on home oxygen therapy. Ventilator:    - No ventilator support    Rehab Therapies: Physical Therapy and Occupational Therapy  Weight Bearing Status/Restrictions: No weight bearing restirctions  Other Medical Equipment (for information only, NOT a DME order):  walker  Other Treatments: no    Patient's personal belongings (please select all that are sent with patient):  None    RN SIGNATURE:  Electronically signed by Bonita Fletcher RN on 4/25/19 at 10:04 AM    CASE MANAGEMENT/SOCIAL WORK SECTION    Inpatient Status Date: 4/23/19    Readmission Risk Assessment Score:  Readmission Risk              Risk of Unplanned Readmission:        13           Discharging to Facility/ Agency   · Name: [facility]  · Address:  · Phone: [phone]  · Fax:      / signature:  Electronically signed by ERIKA Ch LSW on 4/25/19 at 10:27 AM      PHYSICIAN SECTION    Prognosis: Fair    Condition at Discharge: Stable    Rehab Potential (if transferring to Rehab): Fair    Recommended Labs or Other Treatments After Discharge: [facility] for continued PT/OT and [facility] aide. Physician Certification: I certify the above information and transfer of Vee Duque  is necessary for the continuing treatment of the diagnosis listed and that she requires Home Care for less 30 days.      Update Admission H&P: No change in H&P    PHYSICIAN SIGNATURE:  Electronically signed by AMRITA Beckman NP on 4/25/19 at 9:55 Trisha Champion MD

## 2019-04-25 NOTE — PLAN OF CARE
Problem: Falls - Risk of:  Goal: Will remain free from falls  Description  Will remain free from falls  4/25/2019 0932 by Leslie Wheeler RN  Outcome: Ongoing    Problem: Falls - Risk of:  Goal: Absence of physical injury  Description  Absence of physical injury  4/25/2019 0932 by Leslie Wheeler RN  Outcome: Ongoing  Socks applied, call light in reach. Bed in lowest position. Problem: Pain:  Goal: Pain level will decrease  Description  Pain level will decrease  4/25/2019 0932 by Leslie Wheeler RN  Outcome: Ongoing  4/25/2019 0102 by Lilia Horne RN  Outcome: Ongoing     Problem: Pain:  Goal: Control of acute pain  Description  Control of acute pain  4/25/2019 0932 by Leslie Wheeler RN  Outcome: Ongoing    Problem: Pain:  Goal: Control of acute pain  Description  Control of acute pain  4/25/2019 0932 by Leslie Wheeler RN  Outcome: Ongoing  Pain/discomfort being managed with PRN analgesics per MD orders. Pt able to express presence and absence of pain and rate pain appropriately using numerical scale.

## 2019-04-28 LAB
BLOOD CULTURE, ROUTINE: NORMAL
CULTURE, BLOOD 2: NORMAL

## 2019-12-30 ENCOUNTER — APPOINTMENT (OUTPATIENT)
Dept: CT IMAGING | Age: 79
End: 2019-12-30
Payer: MEDICARE

## 2019-12-30 ENCOUNTER — HOSPITAL ENCOUNTER (OUTPATIENT)
Age: 79
Setting detail: OBSERVATION
Discharge: HOME | End: 2020-01-01
Attending: EMERGENCY MEDICINE | Admitting: INTERNAL MEDICINE
Payer: MEDICARE

## 2019-12-30 PROBLEM — K92.2 LOWER GI BLEED: Status: ACTIVE | Noted: 2019-12-30

## 2019-12-30 LAB
A/G RATIO: 1.1 (ref 1.1–2.2)
ALBUMIN SERPL-MCNC: 4 G/DL (ref 3.4–5)
ALP BLD-CCNC: 82 U/L (ref 40–129)
ALT SERPL-CCNC: 7 U/L (ref 10–40)
ANION GAP SERPL CALCULATED.3IONS-SCNC: 14 MMOL/L (ref 3–16)
AST SERPL-CCNC: 15 U/L (ref 15–37)
BASOPHILS ABSOLUTE: 0.1 K/UL (ref 0–0.2)
BASOPHILS RELATIVE PERCENT: 1.3 %
BILIRUB SERPL-MCNC: <0.2 MG/DL (ref 0–1)
BUN BLDV-MCNC: 16 MG/DL (ref 7–20)
CALCIUM SERPL-MCNC: 9.6 MG/DL (ref 8.3–10.6)
CHLORIDE BLD-SCNC: 102 MMOL/L (ref 99–110)
CO2: 28 MMOL/L (ref 21–32)
CREAT SERPL-MCNC: 0.8 MG/DL (ref 0.6–1.2)
EOSINOPHILS ABSOLUTE: 0.1 K/UL (ref 0–0.6)
EOSINOPHILS RELATIVE PERCENT: 0.8 %
GFR AFRICAN AMERICAN: >60
GFR NON-AFRICAN AMERICAN: >60
GLOBULIN: 3.7 G/DL
GLUCOSE BLD-MCNC: 95 MG/DL (ref 70–99)
HCT VFR BLD CALC: 39.7 % (ref 36–48)
HEMOGLOBIN: 13.4 G/DL (ref 12–16)
LIPASE: 39 U/L (ref 13–60)
LYMPHOCYTES ABSOLUTE: 1.4 K/UL (ref 1–5.1)
LYMPHOCYTES RELATIVE PERCENT: 20.1 %
MCH RBC QN AUTO: 27.5 PG (ref 26–34)
MCHC RBC AUTO-ENTMCNC: 33.6 G/DL (ref 31–36)
MCV RBC AUTO: 81.9 FL (ref 80–100)
MONOCYTES ABSOLUTE: 0.6 K/UL (ref 0–1.3)
MONOCYTES RELATIVE PERCENT: 9.1 %
NEUTROPHILS ABSOLUTE: 4.7 K/UL (ref 1.7–7.7)
NEUTROPHILS RELATIVE PERCENT: 68.7 %
OCCULT BLOOD DIAGNOSTIC: ABNORMAL
PDW BLD-RTO: 16.2 % (ref 12.4–15.4)
PLATELET # BLD: 309 K/UL (ref 135–450)
PMV BLD AUTO: 6.6 FL (ref 5–10.5)
POTASSIUM REFLEX MAGNESIUM: 3.8 MMOL/L (ref 3.5–5.1)
RBC # BLD: 4.85 M/UL (ref 4–5.2)
SODIUM BLD-SCNC: 144 MMOL/L (ref 136–145)
TOTAL PROTEIN: 7.7 G/DL (ref 6.4–8.2)
WBC # BLD: 6.9 K/UL (ref 4–11)

## 2019-12-30 PROCEDURE — G0378 HOSPITAL OBSERVATION PER HR: HCPCS

## 2019-12-30 PROCEDURE — 80053 COMPREHEN METABOLIC PANEL: CPT

## 2019-12-30 PROCEDURE — 83690 ASSAY OF LIPASE: CPT

## 2019-12-30 PROCEDURE — 99285 EMERGENCY DEPT VISIT HI MDM: CPT

## 2019-12-30 PROCEDURE — G0328 FECAL BLOOD SCRN IMMUNOASSAY: HCPCS

## 2019-12-30 PROCEDURE — 6360000004 HC RX CONTRAST MEDICATION: Performed by: NURSE PRACTITIONER

## 2019-12-30 PROCEDURE — 85025 COMPLETE CBC W/AUTO DIFF WBC: CPT

## 2019-12-30 PROCEDURE — 6370000000 HC RX 637 (ALT 250 FOR IP): Performed by: NURSE PRACTITIONER

## 2019-12-30 PROCEDURE — 74177 CT ABD & PELVIS W/CONTRAST: CPT

## 2019-12-30 RX ORDER — SIMVASTATIN 40 MG
40 TABLET ORAL NIGHTLY
COMMUNITY
Start: 2019-12-12

## 2019-12-30 RX ORDER — NIFEDIPINE 30 MG/1
30 TABLET, EXTENDED RELEASE ORAL DAILY
Status: DISCONTINUED | OUTPATIENT
Start: 2019-12-31 | End: 2020-01-01 | Stop reason: HOSPADM

## 2019-12-30 RX ORDER — CYCLOBENZAPRINE HCL 10 MG
10 TABLET ORAL ONCE
Status: COMPLETED | OUTPATIENT
Start: 2019-12-30 | End: 2019-12-30

## 2019-12-30 RX ORDER — LIDOCAINE 50 MG/G
1 PATCH TOPICAL DAILY
COMMUNITY
End: 2021-01-19

## 2019-12-30 RX ORDER — SODIUM CHLORIDE 0.9 % (FLUSH) 0.9 %
10 SYRINGE (ML) INJECTION EVERY 12 HOURS SCHEDULED
Status: DISCONTINUED | OUTPATIENT
Start: 2019-12-30 | End: 2020-01-01 | Stop reason: HOSPADM

## 2019-12-30 RX ORDER — PANTOPRAZOLE SODIUM 40 MG/1
40 TABLET, DELAYED RELEASE ORAL DAILY
COMMUNITY
Start: 2019-12-12 | End: 2021-01-19

## 2019-12-30 RX ORDER — ONDANSETRON 2 MG/ML
4 INJECTION INTRAMUSCULAR; INTRAVENOUS EVERY 6 HOURS PRN
Status: DISCONTINUED | OUTPATIENT
Start: 2019-12-30 | End: 2020-01-01 | Stop reason: HOSPADM

## 2019-12-30 RX ORDER — TRAMADOL HYDROCHLORIDE 50 MG/1
50 TABLET ORAL EVERY 6 HOURS PRN
Status: ON HOLD | COMMUNITY
Start: 2019-12-12 | End: 2021-01-26 | Stop reason: SDUPTHER

## 2019-12-30 RX ORDER — SODIUM CHLORIDE 0.9 % (FLUSH) 0.9 %
10 SYRINGE (ML) INJECTION PRN
Status: DISCONTINUED | OUTPATIENT
Start: 2019-12-30 | End: 2020-01-01 | Stop reason: HOSPADM

## 2019-12-30 RX ADMIN — IOPAMIDOL 75 ML: 755 INJECTION, SOLUTION INTRAVENOUS at 18:52

## 2019-12-30 RX ADMIN — CYCLOBENZAPRINE HYDROCHLORIDE 10 MG: 10 TABLET, FILM COATED ORAL at 21:30

## 2019-12-30 ASSESSMENT — PAIN DESCRIPTION - PROGRESSION: CLINICAL_PROGRESSION: GRADUALLY WORSENING

## 2019-12-30 ASSESSMENT — PAIN DESCRIPTION - PAIN TYPE: TYPE: CHRONIC PAIN

## 2019-12-30 ASSESSMENT — PAIN DESCRIPTION - ORIENTATION: ORIENTATION: MID;RIGHT;LEFT

## 2019-12-30 ASSESSMENT — PAIN DESCRIPTION - DESCRIPTORS: DESCRIPTORS: ACHING

## 2019-12-30 ASSESSMENT — PAIN DESCRIPTION - ONSET: ONSET: ON-GOING

## 2019-12-30 ASSESSMENT — PAIN DESCRIPTION - LOCATION: LOCATION: BACK;LEG

## 2019-12-30 ASSESSMENT — PAIN - FUNCTIONAL ASSESSMENT: PAIN_FUNCTIONAL_ASSESSMENT: PREVENTS OR INTERFERES SOME ACTIVE ACTIVITIES AND ADLS

## 2019-12-30 ASSESSMENT — PAIN SCALES - GENERAL: PAINLEVEL_OUTOF10: 8

## 2019-12-30 ASSESSMENT — PAIN DESCRIPTION - FREQUENCY: FREQUENCY: CONTINUOUS

## 2019-12-30 NOTE — ED NOTES
Bed: [facility]  Expected date: 12/30/19  Expected time: 4:50 PM  Means of arrival: [facility]  Comments:  rectal bleeding started this morning hx of diverticulitis        Asia Paredes, RN  12/30/19 5935

## 2019-12-30 NOTE — ED PROVIDER NOTES
tablet Take 1 tablet by mouth daily 30 tablet 0    traZODone (DESYREL) 50 MG tablet Take 1 tablet by mouth nightly as needed for Sleep 30 tablet 0    docusate sodium (COLACE, DULCOLAX) 100 MG CAPS Take 100 mg by mouth 2 times daily as needed for Constipation      lisinopril (PRINIVIL;ZESTRIL) 10 MG tablet TAKE ONE TABLET BY MOUTH DAILY      cyclobenzaprine (FLEXERIL) 10 MG tablet Take 1 tablet by mouth 3 times daily as needed for Muscle spasms 15 tablet 0    polyethylene glycol (GLYCOLAX) packet Take 17 g by mouth daily as needed for Constipation      senna-docusate (PERICOLACE) 8.6-50 MG per tablet Take 1 tablet by mouth daily as needed for Constipation      calcium-vitamin D (OSCAL-500) 500-200 MG-UNIT per tablet Take 1 tablet by mouth daily.  diphenoxylate-atropine (LOMOTIL) 2.5-0.025 MG per tablet Take 1 tablet by mouth 4 times daily as needed for Diarrhea.  Multiple Vitamins-Minerals (THERA-M) TABS Take 1 tablet by mouth daily. ALLERGIES    Allergies   Allergen Reactions    Pcn [Penicillins] Hives and Itching     itchy       SOCIAL AND FAMILY HISTORY    Social History     Socioeconomic History    Marital status:       Spouse name: None    Number of children: None    Years of education: None    Highest education level: None   Occupational History    None   Social Needs    Financial resource strain: None    Food insecurity:     Worry: None     Inability: None    Transportation needs:     Medical: None     Non-medical: None   Tobacco Use    Smoking status: Current Every Day Smoker     Packs/day: 0.25     Years: 50.00     Pack years: 12.50     Types: Cigarettes    Smokeless tobacco: Never Used   Substance and Sexual Activity    Alcohol use: No    Drug use: No    Sexual activity: Never   Lifestyle    Physical activity:     Days per week: None     Minutes per session: None    Stress: None   Relationships    Social connections:     Talks on phone: None     Gets together: None     Attends Tenriism service: None     Active member of club or organization: None     Attends meetings of clubs or organizations: None     Relationship status: None    Intimate partner violence:     Fear of current or ex partner: None     Emotionally abused: None     Physically abused: None     Forced sexual activity: None   Other Topics Concern    None   Social History Narrative    Resides in an apartment with her son    Uses a cane for ambulation     Family History   Problem Relation Age of Onset    High Blood Pressure Mother     High Blood Pressure Father     High Blood Pressure Daughter     Cancer Son        PHYSICAL EXAM    VITAL SIGNS: BP (!) 158/84   Pulse 88   Temp 98.3 °F (36.8 °C) (Oral)   Resp 16   Ht 5' 3.5\" (1.613 m)   Wt 118 lb 9.7 oz (53.8 kg)   SpO2 97%   BMI 20.68 kg/m²   Constitutional:  Well developed, well nourished  Eyes:  Sclera nonicteric, PERRL bilaterally  HENT:  Atraumatic, nose normal, airway patent  Neck: Supple, no JVD  Respiratory:  Lungs clear to auscultation bilaterally, no retractions, no accessory muscle use  Cardiovascular:  regular rate, normal rhythm, no murmurs  GI:  no abdominal tenderness to palpation, no guarding, bowel sounds present  Rectal: Exam with chaperone reveals: Wojciech red blood stool present in the rectal vault, + gross blood, guaiac +. She did have one external hemorrhoid that was nonbleeding; no internal hemorrhoids, fissures, fistulae or abscesses visible or palpable  Musculoskeletal:  No edema, no acute deformity  Integument: No rash, dry skin  Neurologic:  Alert & oriented, no slurred speech  Psychiatric: Cooperative, pleasant affect     RADIOLOGY/PROCEDURES    CT ABDOMEN PELVIS W IV CONTRAST Additional Contrast? None   Final Result   Moderate to severe sigmoid colon diverticulosis. No definite evidence of   diverticulitis. Mild to moderate gas and stool load throughout the colon.       Left of trigger hernia containing a portion of

## 2019-12-31 ENCOUNTER — ANESTHESIA (OUTPATIENT)
Dept: ENDOSCOPY | Age: 79
End: 2019-12-31
Payer: MEDICARE

## 2019-12-31 ENCOUNTER — ANESTHESIA EVENT (OUTPATIENT)
Dept: ENDOSCOPY | Age: 79
End: 2019-12-31
Payer: MEDICARE

## 2019-12-31 VITALS
RESPIRATION RATE: 11 BRPM | SYSTOLIC BLOOD PRESSURE: 144 MMHG | OXYGEN SATURATION: 100 % | DIASTOLIC BLOOD PRESSURE: 77 MMHG

## 2019-12-31 LAB
HCT VFR BLD CALC: 37.4 % (ref 36–48)
HCT VFR BLD CALC: 37.6 % (ref 36–48)
HCT VFR BLD CALC: 38.6 % (ref 36–48)
HEMOGLOBIN: 12.5 G/DL (ref 12–16)
HEMOGLOBIN: 12.6 G/DL (ref 12–16)
HEMOGLOBIN: 12.7 G/DL (ref 12–16)

## 2019-12-31 PROCEDURE — 6360000002 HC RX W HCPCS

## 2019-12-31 PROCEDURE — 6370000000 HC RX 637 (ALT 250 FOR IP): Performed by: INTERNAL MEDICINE

## 2019-12-31 PROCEDURE — 85014 HEMATOCRIT: CPT

## 2019-12-31 PROCEDURE — 3700000001 HC ADD 15 MINUTES (ANESTHESIA): Performed by: INTERNAL MEDICINE

## 2019-12-31 PROCEDURE — 3609027000 HC COLONOSCOPY: Performed by: INTERNAL MEDICINE

## 2019-12-31 PROCEDURE — 94760 N-INVAS EAR/PLS OXIMETRY 1: CPT

## 2019-12-31 PROCEDURE — 36415 COLL VENOUS BLD VENIPUNCTURE: CPT

## 2019-12-31 PROCEDURE — 2500000003 HC RX 250 WO HCPCS

## 2019-12-31 PROCEDURE — 85018 HEMOGLOBIN: CPT

## 2019-12-31 PROCEDURE — 6360000002 HC RX W HCPCS: Performed by: NURSE ANESTHETIST, CERTIFIED REGISTERED

## 2019-12-31 PROCEDURE — 2580000003 HC RX 258: Performed by: INTERNAL MEDICINE

## 2019-12-31 PROCEDURE — 2580000003 HC RX 258: Performed by: NURSE ANESTHETIST, CERTIFIED REGISTERED

## 2019-12-31 PROCEDURE — 2500000003 HC RX 250 WO HCPCS: Performed by: NURSE ANESTHETIST, CERTIFIED REGISTERED

## 2019-12-31 PROCEDURE — G0378 HOSPITAL OBSERVATION PER HR: HCPCS

## 2019-12-31 PROCEDURE — 7100000001 HC PACU RECOVERY - ADDTL 15 MIN: Performed by: INTERNAL MEDICINE

## 2019-12-31 PROCEDURE — 2709999900 HC NON-CHARGEABLE SUPPLY: Performed by: INTERNAL MEDICINE

## 2019-12-31 PROCEDURE — 3700000000 HC ANESTHESIA ATTENDED CARE: Performed by: INTERNAL MEDICINE

## 2019-12-31 PROCEDURE — 7100000000 HC PACU RECOVERY - FIRST 15 MIN: Performed by: INTERNAL MEDICINE

## 2019-12-31 RX ORDER — OXYCODONE HYDROCHLORIDE AND ACETAMINOPHEN 5; 325 MG/1; MG/1
1 TABLET ORAL PRN
Status: DISCONTINUED | OUTPATIENT
Start: 2019-12-31 | End: 2019-12-31 | Stop reason: ALTCHOICE

## 2019-12-31 RX ORDER — ONDANSETRON 2 MG/ML
4 INJECTION INTRAMUSCULAR; INTRAVENOUS
Status: DISCONTINUED | OUTPATIENT
Start: 2019-12-31 | End: 2019-12-31 | Stop reason: ALTCHOICE

## 2019-12-31 RX ORDER — TRAMADOL HYDROCHLORIDE 50 MG/1
50 TABLET ORAL EVERY 6 HOURS PRN
Status: DISCONTINUED | OUTPATIENT
Start: 2019-12-31 | End: 2020-01-01 | Stop reason: HOSPADM

## 2019-12-31 RX ORDER — MORPHINE SULFATE 2 MG/ML
2 INJECTION, SOLUTION INTRAMUSCULAR; INTRAVENOUS EVERY 5 MIN PRN
Status: DISCONTINUED | OUTPATIENT
Start: 2019-12-31 | End: 2019-12-31 | Stop reason: ALTCHOICE

## 2019-12-31 RX ORDER — OXYCODONE HYDROCHLORIDE AND ACETAMINOPHEN 5; 325 MG/1; MG/1
2 TABLET ORAL PRN
Status: DISCONTINUED | OUTPATIENT
Start: 2019-12-31 | End: 2019-12-31 | Stop reason: ALTCHOICE

## 2019-12-31 RX ORDER — TRAZODONE HYDROCHLORIDE 50 MG/1
50 TABLET ORAL NIGHTLY PRN
Status: DISCONTINUED | OUTPATIENT
Start: 2019-12-31 | End: 2020-01-01 | Stop reason: HOSPADM

## 2019-12-31 RX ORDER — MEPERIDINE HYDROCHLORIDE 25 MG/ML
12.5 INJECTION INTRAMUSCULAR; INTRAVENOUS; SUBCUTANEOUS EVERY 5 MIN PRN
Status: DISCONTINUED | OUTPATIENT
Start: 2019-12-31 | End: 2019-12-31 | Stop reason: ALTCHOICE

## 2019-12-31 RX ORDER — LABETALOL HYDROCHLORIDE 5 MG/ML
5 INJECTION, SOLUTION INTRAVENOUS EVERY 10 MIN PRN
Status: DISCONTINUED | OUTPATIENT
Start: 2019-12-31 | End: 2019-12-31 | Stop reason: ALTCHOICE

## 2019-12-31 RX ORDER — LIDOCAINE HYDROCHLORIDE 20 MG/ML
INJECTION, SOLUTION INFILTRATION; PERINEURAL PRN
Status: DISCONTINUED | OUTPATIENT
Start: 2019-12-31 | End: 2019-12-31 | Stop reason: SDUPTHER

## 2019-12-31 RX ORDER — PROPOFOL 10 MG/ML
INJECTION, EMULSION INTRAVENOUS PRN
Status: DISCONTINUED | OUTPATIENT
Start: 2019-12-31 | End: 2019-12-31 | Stop reason: SDUPTHER

## 2019-12-31 RX ORDER — SODIUM CHLORIDE 9 MG/ML
INJECTION, SOLUTION INTRAVENOUS CONTINUOUS PRN
Status: DISCONTINUED | OUTPATIENT
Start: 2019-12-31 | End: 2019-12-31 | Stop reason: SDUPTHER

## 2019-12-31 RX ORDER — MORPHINE SULFATE 2 MG/ML
1 INJECTION, SOLUTION INTRAMUSCULAR; INTRAVENOUS EVERY 5 MIN PRN
Status: DISCONTINUED | OUTPATIENT
Start: 2019-12-31 | End: 2019-12-31 | Stop reason: ALTCHOICE

## 2019-12-31 RX ORDER — HYDRALAZINE HYDROCHLORIDE 20 MG/ML
5 INJECTION INTRAMUSCULAR; INTRAVENOUS
Status: DISCONTINUED | OUTPATIENT
Start: 2019-12-31 | End: 2019-12-31 | Stop reason: ALTCHOICE

## 2019-12-31 RX ADMIN — TRAMADOL HYDROCHLORIDE 50 MG: 50 TABLET, FILM COATED ORAL at 02:37

## 2019-12-31 RX ADMIN — SODIUM CHLORIDE: 9 INJECTION, SOLUTION INTRAVENOUS at 15:02

## 2019-12-31 RX ADMIN — POLYETHYLENE GLYCOL-3350 AND ELECTROLYTES 4000 ML: 236; 6.74; 5.86; 2.97; 22.74 POWDER, FOR SOLUTION ORAL at 06:06

## 2019-12-31 RX ADMIN — LIDOCAINE HYDROCHLORIDE 60 MG: 20 INJECTION, SOLUTION INFILTRATION; PERINEURAL at 15:06

## 2019-12-31 RX ADMIN — TRAMADOL HYDROCHLORIDE 50 MG: 50 TABLET, FILM COATED ORAL at 08:44

## 2019-12-31 RX ADMIN — NIFEDIPINE 30 MG: 30 TABLET, FILM COATED, EXTENDED RELEASE ORAL at 08:44

## 2019-12-31 RX ADMIN — PROPOFOL 100 MG: 10 INJECTION, EMULSION INTRAVENOUS at 15:11

## 2019-12-31 RX ADMIN — TRAMADOL HYDROCHLORIDE 50 MG: 50 TABLET, FILM COATED ORAL at 20:32

## 2019-12-31 RX ADMIN — SODIUM CHLORIDE, PRESERVATIVE FREE 10 ML: 5 INJECTION INTRAVENOUS at 08:45

## 2019-12-31 RX ADMIN — SODIUM CHLORIDE, PRESERVATIVE FREE 10 ML: 5 INJECTION INTRAVENOUS at 20:33

## 2019-12-31 RX ADMIN — PROPOFOL 100 MG: 10 INJECTION, EMULSION INTRAVENOUS at 15:06

## 2019-12-31 ASSESSMENT — PAIN DESCRIPTION - PAIN TYPE
TYPE: CHRONIC PAIN

## 2019-12-31 ASSESSMENT — PAIN DESCRIPTION - DESCRIPTORS
DESCRIPTORS: ACHING

## 2019-12-31 ASSESSMENT — PAIN - FUNCTIONAL ASSESSMENT
PAIN_FUNCTIONAL_ASSESSMENT: PREVENTS OR INTERFERES SOME ACTIVE ACTIVITIES AND ADLS
PAIN_FUNCTIONAL_ASSESSMENT: 0-10
PAIN_FUNCTIONAL_ASSESSMENT: PREVENTS OR INTERFERES SOME ACTIVE ACTIVITIES AND ADLS

## 2019-12-31 ASSESSMENT — PAIN DESCRIPTION - FREQUENCY
FREQUENCY: CONTINUOUS

## 2019-12-31 ASSESSMENT — PAIN SCALES - GENERAL
PAINLEVEL_OUTOF10: 0
PAINLEVEL_OUTOF10: 6
PAINLEVEL_OUTOF10: 0
PAINLEVEL_OUTOF10: 10
PAINLEVEL_OUTOF10: 0
PAINLEVEL_OUTOF10: 0
PAINLEVEL_OUTOF10: 10
PAINLEVEL_OUTOF10: 5

## 2019-12-31 ASSESSMENT — PAIN DESCRIPTION - ONSET
ONSET: ON-GOING

## 2019-12-31 ASSESSMENT — PAIN DESCRIPTION - ORIENTATION
ORIENTATION: LEFT;RIGHT
ORIENTATION: MID;RIGHT;LEFT
ORIENTATION: RIGHT;LEFT
ORIENTATION: RIGHT;LEFT

## 2019-12-31 ASSESSMENT — PAIN DESCRIPTION - PROGRESSION
CLINICAL_PROGRESSION: GRADUALLY WORSENING
CLINICAL_PROGRESSION: GRADUALLY IMPROVING
CLINICAL_PROGRESSION: GRADUALLY WORSENING

## 2019-12-31 ASSESSMENT — PAIN DESCRIPTION - LOCATION
LOCATION: LEG
LOCATION: BACK;LEG

## 2019-12-31 NOTE — H&P
Patient seen and examined by me prior to the procedure. The patient is stable for sedation. There have been no significant changes since my initial consultation note.   Please reference this note for full details    ASA class-3  [address], DO

## 2019-12-31 NOTE — PROGRESS NOTES
Pharmacy Medication Reconciliation Note     List of medications patient is currently taking is complete. Source of information:   1. Patient  2. EMR    Notes regarding home medications:   1. Patient received all of her morning home medication doses before presenting to the ER.       [id number] Sergio, Pharmacy Intern  12/30/2019 8:43 PM

## 2019-12-31 NOTE — PROGRESS NOTES
Pt arrived to floor via stretcher from ED and transfered to bed. Telemetry activated. Patient oriented to room and use of call light. Call light and personal items within reach. Admission and assessment initiated. Telemetryon and confirmed with [facility]. Skin assessment completed with several bright red blood clots from rectum. Patient complaining of leg and back pain. Will send hospitalist secure message regarding pain medication.

## 2019-12-31 NOTE — PROGRESS NOTES
Patient A&O. Call light within reach. Will continue to monitor and reassess.  Electronically signed by Jacinto Crook RN on 12/31/2019

## 2019-12-31 NOTE — PROGRESS NOTES
4 Eyes Skin Assessment     The patient is being assess for  Admission    I agree that 2 RN's have performed a thorough Head to Toe Skin Assessment on the patient. ALL assessment sites listed below have been assessed. Areas assessed by both nurses:   [x]   Head, Face, and Ears   [x]   Shoulders, Back, and Chest  [x]   Arms, Elbows, and Hands   [x]   Coccyx, Sacrum, and IschIum  [x]   Legs, Feet, and Heels        Does the Patient have Skin Breakdown?   No         Ricki Prevention initiated:  Yes  Wound Care Orders initiated:  NA      [facility] nurse consulted for Pressure Injury (Stage 3,4, Unstageable, DTI, NWPT, and Complex wounds), New and Established Ostomies:  NA      Nurse 1 eSignature: Electronically signed by Reji Aguillon RN on 12/30/19 at 11:13 PM    **SHARE this note so that the co-signing nurse is able to place an eSignature**    Nurse 2 eSignature: Electronically signed by Richard Jasso RN on 12/31/19 at 6:18 AM

## 2019-12-31 NOTE — CONSULTS
Probable diverticular bleed. Given third episode of bleeding since 2014, and fair prep being the best bowel prep, will give oral bowel prep today for colonoscopy tomorrow. 2.  Normal EGD    EGD/colonoscopy 12/2014  1. Probable diverticular bleed in distal colon based on stool color being more red in the left colon, and more brown in the ileum and right colon. 2.  Diverticulosis throughout. 3.  Poor bowel prep. 4.  Normal EGD      PAST MEDICAL, SURGICAL, FAMILY, and SOCIAL HISTORY     Past Medical History:   Diagnosis Date    CARROLL positive     Arthritis     Bleeding stomach ulcer     Diverticulitis     GERD (gastroesophageal reflux disease)     Hypertension     Stroke ([facility] [address] 2002    left weakness     Tobacco abuse      Past Surgical History:   Procedure Laterality Date    CHOLECYSTECTOMY  2014    COLONOSCOPY  2011    HEMORRHOID SURGERY      HYSTERECTOMY, TOTAL ABDOMINAL  1970's    JACQUELYN/BSO for 'tumor'     Family History   Problem Relation Age of Onset    High Blood Pressure Mother     High Blood Pressure Father     High Blood Pressure Daughter     Cancer Son      Social History     Socioeconomic History    Marital status:       Spouse name: None    Number of children: None    Years of education: None    Highest education level: None   Occupational History    None   Social Needs    Financial resource strain: None    Food insecurity:     Worry: None     Inability: None    Transportation needs:     Medical: None     Non-medical: None   Tobacco Use    Smoking status: Current Every Day Smoker     Packs/day: 0.25     Years: 50.00     Pack years: 12.50     Types: Cigarettes    Smokeless tobacco: Never Used   Substance and Sexual Activity    Alcohol use: No    Drug use: No    Sexual activity: Never   Lifestyle    Physical activity:     Days per week: None     Minutes per session: None    Stress: None   Relationships    Social connections:     Talks on phone: None     Gets together: None     Attends Sabianism service: None     Active member of club or organization: None     Attends meetings of clubs or organizations: None     Relationship status: None    Intimate partner violence:     Fear of current or ex partner: None     Emotionally abused: None     Physically abused: None     Forced sexual activity: None   Other Topics Concern    None   Social History Narrative    Resides in an apartment with her son    Uses a cane for ambulation       MEDICATIONS   SCHEDULED:  NIFEdipine, 30 mg, Daily  sodium chloride flush, 10 mL, 2 times per day      FLUIDS/DRIPS:    PRNs: traMADol, 50 mg, Q6H PRN  sodium chloride flush, 10 mL, PRN  ondansetron, 4 mg, Q6H PRN      ALLERGIES:  She   Allergies   Allergen Reactions    Pcn [Penicillins] Hives and Itching     itchy       REVIEW OF SYSTEMS   Pertinent ROS noted in HPI    PHYSICAL EXAM     Vitals:    12/30/19 2218 12/30/19 2249 12/31/19 0305 12/31/19 0306   BP: (!) 205/98 (!) 204/92  (!) 172/81   Pulse:  88  86   Resp:  16  18   Temp:  98.4 °F (36.9 °C)  98 °F (36.7 °C)   TempSrc:  Oral  Oral   SpO2: 96% 98%  92%   Weight:  111 lb 1.8 oz (50.4 kg) 111 lb 1.8 oz (50.4 kg)    Height:  5' 3\" (1.6 m)         No intake/output data recorded. Physical Exam:  General appearance: alert, cooperative, no distress  Eyes: Anicteric  Head: Normocephalic, without obvious abnormality  Lungs: clear to auscultation bilaterally, Normal Effort  Heart: regular rate and rhythm, normal S1 and S2, no murmurs or rubs  Abdomen: soft, non-distended, non-tender. Bowel sounds normal. No masses,  no organomegaly.    Extremities: atraumatic, no cyanosis or edema  Skin: warm and dry, no jaundice  Neuro: Grossly intact, A&OX3      LABS AND IMAGING   Laboratory   Recent Labs     12/30/19  1755 12/31/19  0156   WBC 6.9  --    HGB 13.4 12.5   HCT 39.7 37.4   MCV 81.9  --      --      Recent Labs     12/30/19  1755      K 3.8      CO2 28   BUN 16   CREATININE 0.8 Recent Labs     12/30/19  1755   AST 15   ALT 7*   BILITOT <0.2   ALKPHOS 82     Recent Labs     12/30/19  1755   LIPASE 39.0     No results for input(s): PROTIME, INR in the last 72 hours. Imaging  CT ABDOMEN PELVIS W IV CONTRAST Additional Contrast? None   Final Result   Moderate to severe sigmoid colon diverticulosis. No definite evidence of   diverticulitis. Mild to moderate gas and stool load throughout the colon. Left of trigger hernia containing a portion of the urinary bladder. This   hernia is unchanged. Right inguinal hernia containing a loop of small bowel. No evidence of   incarceration. Stable bile duct dilatation status post cholecystectomy. Remote compression fractures of T12 and L1.               ASSESSMENT AND RECOMMENDATIONS   [de-identified] y.o. female with a PMH of  stroke, HTN, HLD, prior diverticular bleed, cholecystectomy, hysterectomy who presented on 12/30/2019 with painless rectal bleeding.  hgb stable. Normal white count. CT abd/pel with moderate to seere sigmoid colon diverticulosis w/o evidence of diverticulitis, mild to moderate gas and stool load throughout the colon. Last colonoscopy 2017 showed moderate diverticulosis, no colon polyps, masses or colitis, focal 4 mm diverticulum with nonbleeding superficial ulcer in the sigmoid colon, small internal hemorrhoids. IMPRESSION:  1. Painless rectal bleeding with acute blood loss anemia- suspect recurrent diverticular bleeding. RECOMMENDATIONS:    Pt consumed part of  bowel prep overnight. Will have pt finish the prep this morning and proceed with colonoscopy this afternoon. Keep NPO. If you have any questions or need any further information, please feel free to contact our consult team.  Thank you for allowing us to participate in the care of Anh Mcdonough.     Giana Ryan PA-C    Attending physician addendum:      I have personally seen and examined the patient, reviewed the patient's medical record and pertinent labs and clinical imaging. I have personally staffed the case with DEAN Barrett. I agree with her consultation note, exam findings, assessment and plans  as written above. I have made appropriate modifications and edited her assessment and plan where needed to reflect my impression and plans for this patient. Patient with painless hematochezia and anemia  Prior admissions in 2014,2016, 2017 with similar presentation. Has had multiple EGD (2 normal in 2014, 2015)  and colonoscopy with poor or marginal preps. Patient with suspected diverticular bleeding in past    Impression  Acute blood loss anemia- suspected diverticular bleeding. H and H stable. Plan:  Colonoscopy. Thank you for allowing me to participate in this patient's care. If there are any questions or concerns regarding this patient, or the plan we have set in place, please feel free to contact me at [phone].      Ro Rayo, DO

## 2019-12-31 NOTE — H&P
[address] [address]                              HISTORY AND PHYSICAL    PATIENT NAME: Ardean Spatz                     :        1940  MED REC NO:   [id number]                          ROOM:       3443  ACCOUNT NO:   [de-identified]                           ADMIT DATE: 2019  PROVIDER:     Madison Frankel MD    I obtained the history and performed the physical exam on the patient on  the Medical floor on 2019. CHIEF COMPLAINT:  Bleeding from below. HISTORY OF PRESENT ILLNESS:  The patient is an 76-year-old  -American female who presented to the hospital with chief  complaint of a 2-day history of what she describes as a sudden onset of  multiple episodes of painless bright red blood per rectum, associated  intermittently with the passage of clots without nausea or vomiting,  fevers or chills. The patient does not have any abdominal pain. She  has no chest pain or dizziness. No other constitutional symptoms. PAST MEDICAL/PAST SURGICAL HISTORY:  1. History of GI bleed in the past with sigmoid diverticulosis. 2.  Hypertension. 3.  Dyslipidemia. PAST SURGICAL HISTORY:  The patient has had colonoscopies in the past.    ALLERGIC HISTORY:  PENICILLIN. FAMILY HISTORY:  Reviewed by me and is currently noncontributory. SOCIAL HISTORY:  Active for tobacco smoking. No illicit substance use. MEDICATIONS:  Protonix, Zocor, Ultram, Lidoderm, Adalat CC, HydroDIURIL,  Desyrel, Colace, Flexeril, GlycoLax, Lilo-Colace, Lomotil. REVIEW OF SYSTEMS:  The patient's review of systems is significant for  the rectal bleeding and for the history of present illness. All other  systems have been reviewed and are negative except for the history of  present illness. PHYSICAL EXAMINATION:  The patient was examined by me on the Medical  floor.   VITAL SIGNS:  Temperature is 98.3, respiratory rate 16, pulse 88, blood  pressure 158/84, saturating 97%. CNS:  Alert, awake, and oriented. PSYCH:  Cooperative, answering questions appropriately. HEENT:  Eyes:  Pupils are reactive to light. ENT:  Extraocular muscle  movements are intact. RESPIRATORY:  No rales or rhonchi. CARDIOVASCULAR:  S1, S2 are heard. No murmurs or rubs. ABDOMEN:  Soft. No guarding, rigidity, or rebound. The patient has got  good bowel sounds. MUSCULOSKELETAL:  No acute deformities. SKIN:  The patient does not have any obvious rashes or lesions. Currently, the patient does not have cutaneous pallor as well. DIAGNOSTIC DATA:  CBC showed initially white count was 6.9, hemoglobin  13.4, hematocrit 39.7, platelets of 302. The patient has Hemoccult  positive stool. Repeat white count is 12.5. CT abdomen and pelvis with IV contrast showed moderate to severe sigmoid  diverticulosis without evidence of diverticulitis. CONSULTATION:  Requested a consultation to Gastroenterology. REVIEW OF PREVIOUS MEDICAL RECORDS:  Admission to the Hospitalist  service in 04/2019 for hypertensive urgency and bronchitis. ASSESSMENT:  1. Acute lower gastrointestinal bleed, possibly diverticulosis. 2.  Hypertension. 3.  Dyslipidemia. 4.  Tobacco dependence. PLAN OF CARE:  The patient is admitted to the Internal Medicine service  to observation. N.p.o. after midnight. GI consult requested. Serial  H&H monitoring has been started. GI prophylaxis with SCDs. Further management will be per endoscopy recommendations. CODE STATUS:  Full. EXPECTED LENGTH OF STAY:  Less than two midnights based on the plan of  care above. RISK:  High due to the patient's presentation with the lower GI bleed. DISPOSITION:  Observation and telemetry.         Sindi Vallejo MD    D: 12/31/2019 5:26:47       T: 12/31/2019 6:37:46     [email or web address]  Job#: [id number]     Doc#: [id number]    CC:

## 2020-01-01 VITALS
HEART RATE: 82 BPM | RESPIRATION RATE: 20 BRPM | HEIGHT: 63 IN | OXYGEN SATURATION: 95 % | SYSTOLIC BLOOD PRESSURE: 173 MMHG | TEMPERATURE: 98.5 F | BODY MASS INDEX: 19.53 KG/M2 | WEIGHT: 110.23 LBS | DIASTOLIC BLOOD PRESSURE: 87 MMHG

## 2020-01-01 LAB
A/G RATIO: 1 (ref 1.1–2.2)
ALBUMIN SERPL-MCNC: 3.4 G/DL (ref 3.4–5)
ALP BLD-CCNC: 73 U/L (ref 40–129)
ALT SERPL-CCNC: 7 U/L (ref 10–40)
ANION GAP SERPL CALCULATED.3IONS-SCNC: 15 MMOL/L (ref 3–16)
AST SERPL-CCNC: 15 U/L (ref 15–37)
BASOPHILS ABSOLUTE: 0.1 K/UL (ref 0–0.2)
BASOPHILS RELATIVE PERCENT: 1.1 %
BILIRUB SERPL-MCNC: 0.3 MG/DL (ref 0–1)
BUN BLDV-MCNC: 13 MG/DL (ref 7–20)
CALCIUM SERPL-MCNC: 9.3 MG/DL (ref 8.3–10.6)
CHLORIDE BLD-SCNC: 101 MMOL/L (ref 99–110)
CO2: 25 MMOL/L (ref 21–32)
CREAT SERPL-MCNC: 0.8 MG/DL (ref 0.6–1.2)
EOSINOPHILS ABSOLUTE: 0.3 K/UL (ref 0–0.6)
EOSINOPHILS RELATIVE PERCENT: 5 %
GFR AFRICAN AMERICAN: >60
GFR NON-AFRICAN AMERICAN: >60
GLOBULIN: 3.3 G/DL
GLUCOSE BLD-MCNC: 79 MG/DL (ref 70–99)
HCT VFR BLD CALC: 37.3 % (ref 36–48)
HEMOGLOBIN: 12.3 G/DL (ref 12–16)
LYMPHOCYTES ABSOLUTE: 1.5 K/UL (ref 1–5.1)
LYMPHOCYTES RELATIVE PERCENT: 27.1 %
MAGNESIUM: 1.8 MG/DL (ref 1.8–2.4)
MCH RBC QN AUTO: 26.7 PG (ref 26–34)
MCHC RBC AUTO-ENTMCNC: 32.8 G/DL (ref 31–36)
MCV RBC AUTO: 81.4 FL (ref 80–100)
MONOCYTES ABSOLUTE: 0.6 K/UL (ref 0–1.3)
MONOCYTES RELATIVE PERCENT: 11.6 %
NEUTROPHILS ABSOLUTE: 3 K/UL (ref 1.7–7.7)
NEUTROPHILS RELATIVE PERCENT: 55.2 %
PDW BLD-RTO: 16.2 % (ref 12.4–15.4)
PLATELET # BLD: 280 K/UL (ref 135–450)
PMV BLD AUTO: 6.4 FL (ref 5–10.5)
POTASSIUM REFLEX MAGNESIUM: 3.1 MMOL/L (ref 3.5–5.1)
RBC # BLD: 4.59 M/UL (ref 4–5.2)
SODIUM BLD-SCNC: 141 MMOL/L (ref 136–145)
TOTAL PROTEIN: 6.7 G/DL (ref 6.4–8.2)
WBC # BLD: 5.4 K/UL (ref 4–11)

## 2020-01-01 PROCEDURE — 94760 N-INVAS EAR/PLS OXIMETRY 1: CPT

## 2020-01-01 PROCEDURE — 6370000000 HC RX 637 (ALT 250 FOR IP): Performed by: INTERNAL MEDICINE

## 2020-01-01 PROCEDURE — 80053 COMPREHEN METABOLIC PANEL: CPT

## 2020-01-01 PROCEDURE — 36415 COLL VENOUS BLD VENIPUNCTURE: CPT

## 2020-01-01 PROCEDURE — 83735 ASSAY OF MAGNESIUM: CPT

## 2020-01-01 PROCEDURE — 85025 COMPLETE CBC W/AUTO DIFF WBC: CPT

## 2020-01-01 PROCEDURE — G0378 HOSPITAL OBSERVATION PER HR: HCPCS

## 2020-01-01 RX ORDER — POTASSIUM CHLORIDE 20 MEQ/1
40 TABLET, EXTENDED RELEASE ORAL ONCE
Status: COMPLETED | OUTPATIENT
Start: 2020-01-01 | End: 2020-01-01

## 2020-01-01 RX ADMIN — TRAMADOL HYDROCHLORIDE 50 MG: 50 TABLET, FILM COATED ORAL at 06:27

## 2020-01-01 RX ADMIN — NIFEDIPINE 30 MG: 30 TABLET, FILM COATED, EXTENDED RELEASE ORAL at 08:46

## 2020-01-01 RX ADMIN — POTASSIUM CHLORIDE 40 MEQ: 20 TABLET, EXTENDED RELEASE ORAL at 10:40

## 2020-01-01 ASSESSMENT — PAIN DESCRIPTION - ONSET: ONSET: ON-GOING

## 2020-01-01 ASSESSMENT — PAIN DESCRIPTION - FREQUENCY: FREQUENCY: CONTINUOUS

## 2020-01-01 ASSESSMENT — PAIN DESCRIPTION - DESCRIPTORS: DESCRIPTORS: ACHING

## 2020-01-01 ASSESSMENT — PAIN DESCRIPTION - PROGRESSION: CLINICAL_PROGRESSION: NOT CHANGED

## 2020-01-01 ASSESSMENT — PAIN DESCRIPTION - LOCATION: LOCATION: LEG

## 2020-01-01 ASSESSMENT — PAIN DESCRIPTION - ORIENTATION: ORIENTATION: LEFT

## 2020-01-01 ASSESSMENT — PAIN - FUNCTIONAL ASSESSMENT: PAIN_FUNCTIONAL_ASSESSMENT: PREVENTS OR INTERFERES SOME ACTIVE ACTIVITIES AND ADLS

## 2020-01-01 ASSESSMENT — PAIN SCALES - GENERAL
PAINLEVEL_OUTOF10: 10
PAINLEVEL_OUTOF10: 5

## 2020-01-01 ASSESSMENT — PAIN DESCRIPTION - PAIN TYPE: TYPE: CHRONIC PAIN

## 2020-01-01 NOTE — DISCHARGE SUMMARY
Rales/Wheezes/Rhonchi. Cardiovascular:  Regular rate and rhythm with normal S1/S2 without murmurs, rubs or gallops. Abdomen: Soft, non-tender, non-distended with normal bowel sounds. Musculoskeletal:  No clubbing, cyanosis or edema bilaterally. Full range of motion without deformity. Skin: Skin color, texture, turgor normal.  No rashes or lesions. Neurologic:  Neurovascularly intact without any focal sensory/motor deficits. Cranial nerves: II-XII intact, grossly non-focal.  Psychiatric:  Alert and oriented, thought content appropriate, normal insight  Capillary Refill: Brisk,< 3 seconds   Peripheral Pulses: +2 palpable, equal bilaterally       Labs: For convenience and continuity at follow-up the following most recent labs are provided:      CBC:    Lab Results   Component Value Date    WBC 5.4 01/01/2020    HGB 12.3 01/01/2020    HCT 37.3 01/01/2020     01/01/2020       Renal:    Lab Results   Component Value Date     01/01/2020    K 3.1 01/01/2020     01/01/2020    CO2 25 01/01/2020    BUN 13 01/01/2020    CREATININE 0.8 01/01/2020    CALCIUM 9.3 01/01/2020    PHOS 2.3 06/28/2016         Significant Diagnostic Studies    Radiology:   CT ABDOMEN PELVIS W IV CONTRAST Additional Contrast? None   Final Result   Moderate to severe sigmoid colon diverticulosis. No definite evidence of   diverticulitis. Mild to moderate gas and stool load throughout the colon. Left of trigger hernia containing a portion of the urinary bladder. This   hernia is unchanged. Right inguinal hernia containing a loop of small bowel. No evidence of   incarceration. Stable bile duct dilatation status post cholecystectomy. Remote compression fractures of T12 and L1.                 Consults:     IP CONSULT TO GI    Disposition:  home     Condition at Discharge: Stable    Discharge Instructions/Follow-up:  meds as prescribed, follow-up with PCP    Code Status:  Full Code     Activity: activity as tolerated    Diet: regular diet      Discharge Medications:     Current Discharge Medication List           Details   pantoprazole (PROTONIX) 40 MG tablet Take 40 mg by mouth daily      simvastatin (ZOCOR) 40 MG tablet Take 40 mg by mouth nightly      traMADol (ULTRAM) 50 MG tablet Take 50 mg by mouth every 6 hours as needed. lidocaine (LIDODERM) 5 % Place 1 patch onto the skin daily 12 hours on, 12 hours off. Menthol, Topical Analgesic, (ICY HOT MEDICATED SPRAY EX) Apply topically as needed      NIFEdipine (ADALAT CC) 30 MG extended release tablet Take 1 tablet by mouth daily  Qty: 30 tablet, Refills: 0      hydrochlorothiazide (HYDRODIURIL) 25 MG tablet Take 1 tablet by mouth daily  Qty: 30 tablet, Refills: 0      traZODone (DESYREL) 50 MG tablet Take 1 tablet by mouth nightly as needed for Sleep  Qty: 30 tablet, Refills: 0      docusate sodium (COLACE, DULCOLAX) 100 MG CAPS Take 100 mg by mouth 2 times daily as needed for Constipation      cyclobenzaprine (FLEXERIL) 10 MG tablet Take 1 tablet by mouth 3 times daily as needed for Muscle spasms  Qty: 15 tablet, Refills: 0      polyethylene glycol (GLYCOLAX) packet Take 17 g by mouth daily as needed for Constipation      senna-docusate (PERICOLACE) 8.6-50 MG per tablet Take 1 tablet by mouth daily as needed for Constipation      diphenoxylate-atropine (LOMOTIL) 2.5-0.025 MG per tablet Take 1 tablet by mouth 4 times daily as needed for Diarrhea. Time Spent on discharge is more than 30 minutes in the examination, evaluation, counseling and review of medications and discharge plan. Signed:    Kulwant Mitchell MD   1/1/2020      Thank you [facility] for the opportunity to be involved in this patient's care. If you have any questions or concerns please feel free to contact me at [phone].

## 2020-01-01 NOTE — DISCHARGE INSTR - COC
Diagnosis Code    Age-related osteoporosis with current pathol fracture of vertebra ([facility] [address] M80.08XA    Acute right-sided low back pain with sciatica M54.40    History of CVA with residual deficit I69.30    Tobacco abuse Z72.0    Multiple closed fractures of ribs S22.49XA    Essential hypertension, benign I10    Generalized weakness R53.1    Hyperlipidemia E78.5    Fall against object W18.00XA    Hypokalemia E87.6    Severe protein-calorie malnutrition Param Block: less than 60% of standard weight) ([facility] [address] E43    Hypertensive urgency I16.0    Lower GI bleed K92.2       Isolation/Infection:   Isolation          No Isolation        Patient Infection Status     None to display          Nurse Assessment:  Last Vital Signs: BP (!) 173/87   Pulse 82   Temp 98.5 °F (36.9 °C) (Oral)   Resp 20   Ht 5' 3\" (1.6 m)   Wt 110 lb 3.7 oz (50 kg)   SpO2 95%   BMI 19.53 kg/m²     Last documented pain score (0-10 scale): Pain Level: 5  Last Weight:   Wt Readings from Last 1 Encounters:   01/01/20 110 lb 3.7 oz (50 kg)     Mental Status:  oriented, alert and forgetful at times     IV Access:  - None    Nursing Mobility/ADLs:  Walking   Assisted  Transfer  Assisted  Bathing  Assisted  Dressing  Assisted  Toileting  Assisted  Feeding  Independent  Med Admin  Independent  Med Delivery   whole    Wound Care Documentation and Therapy:        Elimination:  Continence:   · Bowel: Yes  · Bladder: Yes  Urinary Catheter: None   Colostomy/Ileostomy/Ileal Conduit: No       Date of Last BM: 12/31/2019    Intake/Output Summary (Last 24 hours) at 1/1/2020 1140  Last data filed at 1/1/2020 0300  Gross per 24 hour   Intake 1500 ml   Output 0 ml   Net 1500 ml     I/O last 3 completed shifts: In: 1500 [P.O.:1240;  I.V.:260]  Out: 0     Safety Concerns:     History of Falls (last 30 days) and At Risk for Falls    Impairments/Disabilities:      left sided trevor-paresis     Nutrition Therapy:  Current Nutrition Therapy:   - Oral Diet:

## 2021-01-19 ENCOUNTER — HOSPITAL ENCOUNTER (INPATIENT)
Age: 81
LOS: 7 days | Discharge: NURSING, REHAB OR LONG-TERM CARE | DRG: 871 | End: 2021-01-26
Attending: EMERGENCY MEDICINE | Admitting: FAMILY MEDICINE
Payer: MEDICARE

## 2021-01-19 ENCOUNTER — APPOINTMENT (OUTPATIENT)
Dept: CT IMAGING | Age: 81
DRG: 871 | End: 2021-01-19
Payer: MEDICARE

## 2021-01-19 ENCOUNTER — APPOINTMENT (OUTPATIENT)
Dept: GENERAL RADIOLOGY | Age: 81
DRG: 871 | End: 2021-01-19
Payer: MEDICARE

## 2021-01-19 DIAGNOSIS — R31.9 URINARY TRACT INFECTION WITH HEMATURIA, SITE UNSPECIFIED: Primary | ICD-10-CM

## 2021-01-19 DIAGNOSIS — N17.9 SEPSIS WITH ACUTE RENAL FAILURE WITHOUT SEPTIC SHOCK, DUE TO UNSPECIFIED ORGANISM, UNSPECIFIED ACUTE RENAL FAILURE TYPE (HCC): ICD-10-CM
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
## 2021-01-19 DIAGNOSIS — R65.20 SEPSIS WITH ACUTE RENAL FAILURE WITHOUT SEPTIC SHOCK, DUE TO UNSPECIFIED ORGANISM, UNSPECIFIED ACUTE RENAL FAILURE TYPE (HCC): ICD-10-CM

## 2021-01-19 DIAGNOSIS — N39.0 URINARY TRACT INFECTION WITH HEMATURIA, SITE UNSPECIFIED: Primary | ICD-10-CM

## 2021-01-19 DIAGNOSIS — A41.9 SEPSIS WITH ACUTE RENAL FAILURE WITHOUT SEPTIC SHOCK, DUE TO UNSPECIFIED ORGANISM, UNSPECIFIED ACUTE RENAL FAILURE TYPE (HCC): ICD-10-CM

## 2021-01-19 LAB
A/G RATIO: 0.8 (ref 1.1–2.2)
ALBUMIN SERPL-MCNC: 4.1 G/DL (ref 3.4–5)
ALP BLD-CCNC: 110 U/L (ref 40–129)
ALT SERPL-CCNC: 11 U/L (ref 10–40)
ANION GAP SERPL CALCULATED.3IONS-SCNC: 21 MMOL/L (ref 3–16)
AST SERPL-CCNC: 33 U/L (ref 15–37)
BASOPHILS ABSOLUTE: 0 K/UL (ref 0–0.2)
BASOPHILS RELATIVE PERCENT: 0.3 %
BILIRUB SERPL-MCNC: 0.7 MG/DL (ref 0–1)
BILIRUBIN URINE: NEGATIVE
BLOOD, URINE: ABNORMAL
BUN BLDV-MCNC: 21 MG/DL (ref 7–20)
CALCIUM SERPL-MCNC: 10.1 MG/DL (ref 8.3–10.6)
CHLORIDE BLD-SCNC: 92 MMOL/L (ref 99–110)
CLARITY: ABNORMAL
CO2: 22 MMOL/L (ref 21–32)
COLOR: YELLOW
COMMENT UA: ABNORMAL
CREAT SERPL-MCNC: 1.4 MG/DL (ref 0.6–1.2)
EOSINOPHILS ABSOLUTE: 0 K/UL (ref 0–0.6)
EOSINOPHILS RELATIVE PERCENT: 0 %
EPITHELIAL CELLS, UA: 19 /HPF (ref 0–5)
GFR AFRICAN AMERICAN: 44
GFR NON-AFRICAN AMERICAN: 36
GLOBULIN: 4.9 G/DL
GLUCOSE BLD-MCNC: 135 MG/DL (ref 70–99)
GLUCOSE BLD-MCNC: 141 MG/DL (ref 70–99)
GLUCOSE BLD-MCNC: 171 MG/DL (ref 70–99)
GLUCOSE BLD-MCNC: 181 MG/DL (ref 70–99)
GLUCOSE BLD-MCNC: 220 MG/DL (ref 70–99)
GLUCOSE BLD-MCNC: 269 MG/DL (ref 70–99)
GLUCOSE URINE: 100 MG/DL
HCT VFR BLD CALC: 53 % (ref 36–48)
HEMOGLOBIN: 17 G/DL (ref 12–16)
HYALINE CASTS: ABNORMAL /LPF (ref 0–2)
INR BLD: 1.14 (ref 0.86–1.14)
KETONES, URINE: 15 MG/DL
LACTIC ACID, SEPSIS: 1.8 MMOL/L (ref 0.4–1.9)
LACTIC ACID, SEPSIS: 5.9 MMOL/L (ref 0.4–1.9)
LACTIC ACID, SEPSIS: 5.9 MMOL/L (ref 0.4–1.9)
LEUKOCYTE ESTERASE, URINE: ABNORMAL
LYMPHOCYTES ABSOLUTE: 0.7 K/UL (ref 1–5.1)
LYMPHOCYTES RELATIVE PERCENT: 5.2 %
MCH RBC QN AUTO: 25.6 PG (ref 26–34)
MCHC RBC AUTO-ENTMCNC: 32.2 G/DL (ref 31–36)
MCV RBC AUTO: 79.6 FL (ref 80–100)
MICROSCOPIC EXAMINATION: YES
MONOCYTES ABSOLUTE: 0.6 K/UL (ref 0–1.3)
MONOCYTES RELATIVE PERCENT: 4.3 %
NEUTROPHILS ABSOLUTE: 12.9 K/UL (ref 1.7–7.7)
NEUTROPHILS RELATIVE PERCENT: 90.2 %
NITRITE, URINE: NEGATIVE
PDW BLD-RTO: 17 % (ref 12.4–15.4)
PERFORMED ON: ABNORMAL
PH UA: 5.5 (ref 5–8)
PLATELET # BLD: 348 K/UL (ref 135–450)
PMV BLD AUTO: 7.1 FL (ref 5–10.5)
POTASSIUM REFLEX MAGNESIUM: 4 MMOL/L (ref 3.5–5.1)
PRO-BNP: 3078 PG/ML (ref 0–449)
PROTEIN UA: >=300 MG/DL
PROTHROMBIN TIME: 13.2 SEC (ref 10–13.2)
RBC # BLD: 6.66 M/UL (ref 4–5.2)
RBC UA: ABNORMAL /HPF (ref 0–4)
SARS-COV-2, NAAT: NOT DETECTED
SODIUM BLD-SCNC: 135 MMOL/L (ref 136–145)
SPECIFIC GRAVITY UA: 1.02 (ref 1–1.03)
TOTAL PROTEIN: 9 G/DL (ref 6.4–8.2)
TROPONIN: 0.02 NG/ML
URINE REFLEX TO CULTURE: YES
URINE TYPE: ABNORMAL
UROBILINOGEN, URINE: 1 E.U./DL
WBC # BLD: 14.3 K/UL (ref 4–11)
WBC UA: 13 /HPF (ref 0–5)

## 2021-01-19 PROCEDURE — G0328 FECAL BLOOD SCRN IMMUNOASSAY: HCPCS

## 2021-01-19 PROCEDURE — 84484 ASSAY OF TROPONIN QUANT: CPT

## 2021-01-19 PROCEDURE — 36415 COLL VENOUS BLD VENIPUNCTURE: CPT

## 2021-01-19 PROCEDURE — 85025 COMPLETE CBC W/AUTO DIFF WBC: CPT

## 2021-01-19 PROCEDURE — 6360000004 HC RX CONTRAST MEDICATION: Performed by: PHYSICIAN ASSISTANT

## 2021-01-19 PROCEDURE — 96367 TX/PROPH/DG ADDL SEQ IV INF: CPT

## 2021-01-19 PROCEDURE — 2580000003 HC RX 258: Performed by: FAMILY MEDICINE

## 2021-01-19 PROCEDURE — 70496 CT ANGIOGRAPHY HEAD: CPT

## 2021-01-19 PROCEDURE — 2060000000 HC ICU INTERMEDIATE R&B

## 2021-01-19 PROCEDURE — U0002 COVID-19 LAB TEST NON-CDC: HCPCS

## 2021-01-19 PROCEDURE — 93005 ELECTROCARDIOGRAM TRACING: CPT | Performed by: EMERGENCY MEDICINE

## 2021-01-19 PROCEDURE — 71045 X-RAY EXAM CHEST 1 VIEW: CPT

## 2021-01-19 PROCEDURE — 96365 THER/PROPH/DIAG IV INF INIT: CPT

## 2021-01-19 PROCEDURE — 2580000003 HC RX 258: Performed by: PHYSICIAN ASSISTANT

## 2021-01-19 PROCEDURE — 80053 COMPREHEN METABOLIC PANEL: CPT

## 2021-01-19 PROCEDURE — 87077 CULTURE AEROBIC IDENTIFY: CPT

## 2021-01-19 PROCEDURE — 96361 HYDRATE IV INFUSION ADD-ON: CPT

## 2021-01-19 PROCEDURE — 87086 URINE CULTURE/COLONY COUNT: CPT

## 2021-01-19 PROCEDURE — 83880 ASSAY OF NATRIURETIC PEPTIDE: CPT

## 2021-01-19 PROCEDURE — 6360000002 HC RX W HCPCS: Performed by: PHYSICIAN ASSISTANT

## 2021-01-19 PROCEDURE — 81001 URINALYSIS AUTO W/SCOPE: CPT

## 2021-01-19 PROCEDURE — 99285 EMERGENCY DEPT VISIT HI MDM: CPT

## 2021-01-19 PROCEDURE — 70450 CT HEAD/BRAIN W/O DYE: CPT

## 2021-01-19 PROCEDURE — 83605 ASSAY OF LACTIC ACID: CPT

## 2021-01-19 PROCEDURE — 6370000000 HC RX 637 (ALT 250 FOR IP): Performed by: FAMILY MEDICINE

## 2021-01-19 PROCEDURE — 6370000000 HC RX 637 (ALT 250 FOR IP): Performed by: PHYSICIAN ASSISTANT

## 2021-01-19 PROCEDURE — 87040 BLOOD CULTURE FOR BACTERIA: CPT

## 2021-01-19 PROCEDURE — 85610 PROTHROMBIN TIME: CPT

## 2021-01-19 RX ORDER — OYSTER SHELL CALCIUM WITH VITAMIN D 500; 200 MG/1; [IU]/1
1 TABLET, FILM COATED ORAL 2 TIMES DAILY
COMMUNITY

## 2021-01-19 RX ORDER — ACETAMINOPHEN 325 MG/1
650 TABLET ORAL EVERY 6 HOURS PRN
Status: DISCONTINUED | OUTPATIENT
Start: 2021-01-19 | End: 2021-01-26 | Stop reason: HOSPADM

## 2021-01-19 RX ORDER — PANTOPRAZOLE SODIUM 40 MG/1
40 TABLET, DELAYED RELEASE ORAL DAILY
COMMUNITY
End: 2021-12-09

## 2021-01-19 RX ORDER — SODIUM CHLORIDE 9 MG/ML
INJECTION, SOLUTION INTRAVENOUS CONTINUOUS
Status: DISCONTINUED | OUTPATIENT
Start: 2021-01-19 | End: 2021-01-19

## 2021-01-19 RX ORDER — SODIUM CHLORIDE 0.9 % (FLUSH) 0.9 %
10 SYRINGE (ML) INJECTION EVERY 12 HOURS SCHEDULED
Status: DISCONTINUED | OUTPATIENT
Start: 2021-01-19 | End: 2021-01-26 | Stop reason: HOSPADM

## 2021-01-19 RX ORDER — SODIUM CHLORIDE 0.9 % (FLUSH) 0.9 %
10 SYRINGE (ML) INJECTION PRN
Status: DISCONTINUED | OUTPATIENT
Start: 2021-01-19 | End: 2021-01-26 | Stop reason: HOSPADM

## 2021-01-19 RX ORDER — LABETALOL HYDROCHLORIDE 5 MG/ML
10 INJECTION, SOLUTION INTRAVENOUS EVERY 6 HOURS PRN
Status: DISCONTINUED | OUTPATIENT
Start: 2021-01-19 | End: 2021-01-26 | Stop reason: HOSPADM

## 2021-01-19 RX ORDER — LEVOFLOXACIN 5 MG/ML
500 INJECTION, SOLUTION INTRAVENOUS ONCE
Status: COMPLETED | OUTPATIENT
Start: 2021-01-20 | End: 2021-01-20

## 2021-01-19 RX ORDER — 0.9 % SODIUM CHLORIDE 0.9 %
1000 INTRAVENOUS SOLUTION INTRAVENOUS ONCE
Status: COMPLETED | OUTPATIENT
Start: 2021-01-19 | End: 2021-01-19

## 2021-01-19 RX ORDER — LISINOPRIL 20 MG/1
20 TABLET ORAL DAILY
Status: ON HOLD | COMMUNITY
End: 2021-01-26 | Stop reason: HOSPADM

## 2021-01-19 RX ORDER — ATORVASTATIN CALCIUM 20 MG/1
20 TABLET, FILM COATED ORAL NIGHTLY
Status: DISCONTINUED | OUTPATIENT
Start: 2021-01-19 | End: 2021-01-26 | Stop reason: HOSPADM

## 2021-01-19 RX ORDER — DEXTROSE AND SODIUM CHLORIDE 5; .45 G/100ML; G/100ML
INJECTION, SOLUTION INTRAVENOUS CONTINUOUS
Status: DISCONTINUED | OUTPATIENT
Start: 2021-01-19 | End: 2021-01-22

## 2021-01-19 RX ORDER — NICOTINE POLACRILEX 4 MG
15 LOZENGE BUCCAL PRN
Status: DISCONTINUED | OUTPATIENT
Start: 2021-01-19 | End: 2021-01-26 | Stop reason: HOSPADM

## 2021-01-19 RX ORDER — ACETAMINOPHEN 650 MG/1
650 SUPPOSITORY RECTAL ONCE
Status: COMPLETED | OUTPATIENT
Start: 2021-01-19 | End: 2021-01-19

## 2021-01-19 RX ORDER — TRAZODONE HYDROCHLORIDE 50 MG/1
50 TABLET ORAL NIGHTLY PRN
COMMUNITY

## 2021-01-19 RX ORDER — ACETAMINOPHEN 650 MG/1
650 SUPPOSITORY RECTAL EVERY 6 HOURS PRN
Status: DISCONTINUED | OUTPATIENT
Start: 2021-01-19 | End: 2021-01-26 | Stop reason: HOSPADM

## 2021-01-19 RX ORDER — HYDRALAZINE HYDROCHLORIDE 25 MG/1
25 TABLET, FILM COATED ORAL EVERY 8 HOURS SCHEDULED
Status: DISCONTINUED | OUTPATIENT
Start: 2021-01-19 | End: 2021-01-20

## 2021-01-19 RX ORDER — DEXTROSE MONOHYDRATE 25 G/50ML
12.5 INJECTION, SOLUTION INTRAVENOUS PRN
Status: DISCONTINUED | OUTPATIENT
Start: 2021-01-19 | End: 2021-01-26 | Stop reason: HOSPADM

## 2021-01-19 RX ORDER — DEXTROSE MONOHYDRATE 50 MG/ML
100 INJECTION, SOLUTION INTRAVENOUS PRN
Status: DISCONTINUED | OUTPATIENT
Start: 2021-01-19 | End: 2021-01-26 | Stop reason: HOSPADM

## 2021-01-19 RX ORDER — LEVOFLOXACIN 5 MG/ML
250 INJECTION, SOLUTION INTRAVENOUS EVERY 24 HOURS
Status: DISCONTINUED | OUTPATIENT
Start: 2021-01-21 | End: 2021-01-21

## 2021-01-19 RX ORDER — DOCUSATE SODIUM 100 MG/1
100 CAPSULE, LIQUID FILLED ORAL 2 TIMES DAILY PRN
COMMUNITY

## 2021-01-19 RX ORDER — NIFEDIPINE 30 MG/1
30 TABLET, EXTENDED RELEASE ORAL DAILY
Status: DISCONTINUED | OUTPATIENT
Start: 2021-01-19 | End: 2021-01-26 | Stop reason: HOSPADM

## 2021-01-19 RX ORDER — OXYBUTYNIN CHLORIDE 5 MG/1
5 TABLET ORAL 2 TIMES DAILY
COMMUNITY

## 2021-01-19 RX ORDER — PANTOPRAZOLE SODIUM 40 MG/1
40 TABLET, DELAYED RELEASE ORAL DAILY
Status: DISCONTINUED | OUTPATIENT
Start: 2021-01-19 | End: 2021-01-26 | Stop reason: HOSPADM

## 2021-01-19 RX ADMIN — VANCOMYCIN HYDROCHLORIDE 1000 MG: 1 INJECTION, POWDER, LYOPHILIZED, FOR SOLUTION INTRAVENOUS at 11:10

## 2021-01-19 RX ADMIN — HYDRALAZINE HYDROCHLORIDE 25 MG: 25 TABLET, FILM COATED ORAL at 12:44

## 2021-01-19 RX ADMIN — SODIUM CHLORIDE: 9 INJECTION, SOLUTION INTRAVENOUS at 11:26

## 2021-01-19 RX ADMIN — Medication 10 ML: at 11:10

## 2021-01-19 RX ADMIN — SODIUM CHLORIDE 1000 ML: 9 INJECTION, SOLUTION INTRAVENOUS at 09:22

## 2021-01-19 RX ADMIN — IOPAMIDOL 75 ML: 755 INJECTION, SOLUTION INTRAVENOUS at 08:26

## 2021-01-19 RX ADMIN — CEFEPIME HYDROCHLORIDE 2 G: 2 INJECTION, POWDER, FOR SOLUTION INTRAVENOUS at 10:40

## 2021-01-19 RX ADMIN — ACETAMINOPHEN 650 MG: 650 SUPPOSITORY RECTAL at 09:23

## 2021-01-19 RX ADMIN — DEXTROSE AND SODIUM CHLORIDE: 5; 450 INJECTION, SOLUTION INTRAVENOUS at 20:28

## 2021-01-19 RX ADMIN — SODIUM CHLORIDE, PRESERVATIVE FREE 10 ML: 5 INJECTION INTRAVENOUS at 20:28

## 2021-01-19 RX ADMIN — SODIUM CHLORIDE: 9 INJECTION, SOLUTION INTRAVENOUS at 13:49

## 2021-01-19 RX ADMIN — NIFEDIPINE 30 MG: 30 TABLET, EXTENDED RELEASE ORAL at 12:44

## 2021-01-19 ASSESSMENT — PAIN SCALES - WONG BAKER: WONGBAKER_NUMERICALRESPONSE: 0

## 2021-01-19 NOTE — ED NOTES
Fall risk screening completed. Fall risk bracelet applied to patient. Non-skid socks provided and placed on patient. The fall risk is indicated using  dome light . Based on score, a bed alarm was indicated and applied. The call light is within the patient's reach, and instructions for use were provided. The bed is in the lowest position with wheels locked. The patient has been advised to notify staff, using the call light, if there is a need to get up or use restroom. The patient verbalized understanding of safety precautions and how to contact staff for assistance.       Ariadna Landis RN  01/19/21 1002

## 2021-01-19 NOTE — PROGRESS NOTES
Patient arrived to room 5126 via stretcher at 1540. Patient alert but does not always follow commands. Telemetry box 79 applied and verified sinus tachycardia with [facility]. Oriented to room and call light. Bedside table and call light in reach. Bed in low position with bed alarm on.   Electronically signed by Aspen Seymour RN on 1/19/2021 at 2:03 PM

## 2021-01-19 NOTE — ED NOTES
Pt. Arrived via EMS on stretcher. Pt. With slurred speech and unable to answer questions appropriately. . Able to move right extremities, no movement to upper left extemityand limited to lower left extremity. Spoke with son who states that at home pt. Able to walk with use of walker, alert and oriented times 4. States that she does have decreased to no movement of left upper extremity but moves left leg with no issue,. States he woke up today to help her perform ADL's and \"she had messed herself and was acting loopy\". Dr. Praneeth Arnold and DEAN Riggs at bedside and updated with pt. Baseline information.         Sangeeta Chacon RN  01/19/21 7200

## 2021-01-19 NOTE — PROGRESS NOTES
Clinical Pharmacy Note  Renal Dose Adjustment    Tonya Juárez is receiving the following renally eliminated medications: Lovenox, Levaquin. Based on the patient's estimated creatinine clearance of Estimated Creatinine Clearance: 25 mL/min (A) (based on SCr of 1.4 mg/dL (H)). and urine output, the doses have been adjusted. Pharmacy will continue to monitor and adjust dose as needed for changes in renal function.       Geovanni Serum Leisure 1/19/2021 11:40 AM

## 2021-01-19 NOTE — Clinical Note
Patient Class: Inpatient [101]   REQUIRED: Diagnosis: Sepsis ([facility] [address] [[id number]]   Estimated Length of Stay: Estimated stay of more than 2 midnights   Telemetry Bed Required?: Yes

## 2021-01-19 NOTE — ACP (ADVANCE CARE PLANNING)
Conversation Outcomes:  [x] ACP discussion completed  [] Existing advance directive reviewed with patient; no changes to patient's previously recorded wishes  [] New Advance Directive completed  [] Portable Do Not Rescitate prepared for Provider review and signature  [] POLST/POST/MOLST/MOST prepared for Provider review and signature      Follow-up plan:    [] Schedule follow-up conversation to continue planning  [] Referred individual to Provider for additional questions/concerns   [] Advised patient/agent/surrogate to review completed ACP document and update if needed with changes in condition, patient preferences or care setting    [] This note routed to one or more involved healthcare providers

## 2021-01-19 NOTE — H&P
Hospital Medicine History & Physical      PCP: [facility]    Date of Admission: 1/19/2021    Date of Service: Pt seen/examined on 1/19/2021 and Admitted to Inpatient with expected LOS greater than two midnights due to medical therapy. Chief Complaint:  Confusion, weakness, slurred speech      History Of Present Illness:    William Davidson is 79 y.o. female with PMH CVA, HTN, GERD, GIB 2/2 PUD, tobacco abuse who presented to [facility] due to decrease responsiveness. History taken per chart review, RN, and son at bedside. Patient has history CVA w/ residual left arm and leg weakness. Last seen well at 8 pm last night, this morning her son found her with worsening left sided weakness, confusion, slurred speech and decreased responsiveness. EMS found patient with serum glucose too low to read. She received 15 g oral glucose with improvement in her blood sugar level. Patient started improving after she was given gucose, she is more alert and speaking without signifcant slurr but still is confused. Noted to have fever of 102 and started on abx in ED for sepsis due to UTI. Patient unable to given history, is following some commands      Past Medical History:          Diagnosis Date    CARROLL positive     Arthritis     Bleeding stomach ulcer     Diverticulitis     GERD (gastroesophageal reflux disease)     Hypertension     Stroke ([facility] [address] 2002    left weakness     Tobacco abuse        Past Surgical History:          Procedure Laterality Date    CHOLECYSTECTOMY  2014    COLONOSCOPY  2011    COLONOSCOPY N/A 12/31/2019    COLONOSCOPY DIAGNOSTIC performed by Luis Carlos Montoya DO at [facility], TOTAL ABDOMINAL  1970's    JACQUELYN/BSO for 'tumor'       Medications Prior to Admission:      Prior to Admission medications    Medication Sig Start Date End Date Taking?  Authorizing Provider   docusate sodium (COLACE) 100 MG capsule Take 100 mg by mouth clear.  Neck: Supple, with full range of motion. No jugular venous distention. Trachea midline. Respiratory:  Normal respiratory effort. Clear to auscultation, bilaterally without Rales/Wheezes/Rhonchi. Cardiovascular:  Regular rate and rhythm  Abdomen: Soft, non-distended, + suprapubic ttp  Musculoskeletal:  No edema bilaterally. Skin: Skin color, texture, turgor normal.  No rashes or lesions. Neurologic:  Left sided weakness, clear speech  Psychiatric:  Alert, impairedinsight  Capillary Refill: Brisk,< 3 seconds   Peripheral Pulses: +2 palpable, equal bilaterally       Labs:     Recent Labs     01/19/21 0904   WBC 14.3*   HGB 17.0*   HCT 53.0*        Recent Labs     01/19/21 0904   *   K 4.0   CL 92*   CO2 22   BUN 21*   CREATININE 1.4*   CALCIUM 10.1     Recent Labs     01/19/21 0904   AST 33   ALT 11   BILITOT 0.7   ALKPHOS 110     Recent Labs     01/19/21 0904   INR 1.14     Recent Labs     01/19/21 0904   TROPONINI 0.02*       Urinalysis:      Lab Results   Component Value Date    NITRU Negative 01/19/2021    WBCUA 13 01/19/2021    BACTERIA 1+ 09/29/2018    RBCUA 0-2 01/19/2021    BLOODU MODERATE 01/19/2021    SPECGRAV 1.021 01/19/2021    GLUCOSEU 100 01/19/2021    GLUCOSEU NEGATIVE 07/10/2010       Radiology:       XR CHEST PORTABLE   Final Result   No convincing evidence for acute cardiopulmonary pathology. Findings suggestive of COPD. CTA HEAD NECK W CONTRAST   Preliminary Result   1. 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 This   is presumed chronic in nature. 2. Mild-to-moderate stenosis at the origin of the right vertebral artery. 3. Moderate focal stenosis of the left subclavian artery. 4. No hemodynamically significant stenosis or branch occlusion in the   cervical carotid arteries. 5. Moderate focal stenosis of M2 branch of left middle cerebral artery. Mild   stenosis within left M1 segment. 6. 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 CT HEAD WO CONTRAST   Preliminary Result   No acute intracranial abnormality. Mild prominence of the pituitary gland is not significantly changed dating   back to 11/1/2018. Underlying adenoma would be difficult to exclude. If   clinically indicated this could be further evaluated with MRI. Critical results were called by Dr. Zenon Cooney to [facility] on   1/19/2021 at 08:32. ASSESSMENT:    Active Hospital Problems    Diagnosis Date Noted    Sepsis [facility]) [A41.9] 01/19/2021         PLAN:  Acute metabolic encephalopathy  - due to hypoglycemia and Sepsis  - treat accordingly and monitor   - CT head negative for acute abnormality  - Speech thearpy to evaluate  - pt/ot  - neuro checks    UTI  - urine culture ordered  - levaquin    Sepsis  - febrile, tachypnea, tachycardia POA  - bl cx sent  - 30 cc/kg bolus given, c/w IVF  - abx as above    Lactic acidosis   - trend, IVF    DANIEL   - IVF  - hold ace-I, hctz  - monitor    Hypoglycemia  - protocol prn, monitor poc glucose levels  - d5 1/2 ns IVF until able to maintain po intake    HTN - elevated, did no take home meds today  - resume nifedipine  - add hydralazine  - holding ace-I, hctz d/t daniel  - consider IV prn if remains elevated    HLD - statin    Hx cva - left sided weakness    DVT Prophylaxis: lovenox  Diet: Diet NPO Effective Now  Code Status: Prior      Dispo - admit to Raudel Burton MD    [facility] you [facility] for the opportunity to be involved in this patient's care. If you have any questions or concerns please feel free to contact me.

## 2021-01-19 NOTE — ED NOTES
Bed: B-34  Expected date: 1/19/21  Expected time: 7:40 AM  Means of arrival: [facility]  Comments:  [address], RN  01/19/21 9163

## 2021-01-19 NOTE — ED PROVIDER NOTES
[address]       Pt Name: Vita Whitaker  MRN: [id number]  [place] 1940  Date of evaluation: 1/19/2021  Provider: DEAN Monsalve    Shared Visit or Autonomous Visit:  I have seen and evaluated this patient with my supervising physician Brittany Carlson MD.      [address] [place] [address]       Chief Complaint   Patient presents with    Altered Mental Status     lkn 8pm per son, at baseline pt A&Ox3, able to hold conversation     Hypoglycemia     BS read low per squad, given 15g oral glucose  on repeat per squad        HISTORY OF PRESENT ILLNESS  (Location/Symptom, Timing/Onset, Context/Setting, Quality, Duration, Modifying Factors, Severity.)   Vita Whitaker is a 79 y.o. female who presents to the emergency department with complaints of altered mental status, left-sided weakness. Patient comes from home, and the patient's son reports that she does have a history of stroke with a little bit of left arm weakness as a result, but normally ambulates well, has normal mental status, and normal speech. He says he last saw her normal at 8:00 PM yesterday. He went to check on her this morning and found that she was obviously altered and not able to get up. EMS was summoned, and upon arrival they noted that her serum glucose was too low to read. EMS gave her 15 g of oral glucose and her repeat test showed a glucose rising to over 200. Patient presents with slurred speech and dysarthria, unable to contribute meaningfully to this HPI. No other complaints. Nursing Notes were reviewed and I agree. REVIEW OF SYSTEMS    (2-9 systems for level 4, 10 or more for level 5)     ROS unable to be conducted due to patient's mental status. Information based on report from patient's son at bedside. Neurological: Positive for left arm and leg weakness, altered mental status, slurred speech.         PAST MEDICAL HISTORY         Diagnosis Date    CARROLL positive     Arthritis     Bleeding stomach ulcer     Diverticulitis     GERD (gastroesophageal reflux disease)     Hypertension     Stroke ([address] 2002    left weakness     Tobacco abuse        SURGICAL HISTORY           Procedure Laterality Date    CHOLECYSTECTOMY      COLONOSCOPY      COLONOSCOPY N/A 2019    COLONOSCOPY DIAGNOSTIC performed by Jose Fletcher.DO at [address] [facility] [address], TOTAL ABDOMINAL  1970's    JACQUELYN/BSO for 'tumor'       CURRENT MEDICATIONS       Previous Medications    CALCIUM-VITAMIN D (OSCAL-500) 500-200 MG-UNIT PER TABLET    Take 1 tablet by mouth 2 times daily    DOCUSATE SODIUM (COLACE) 100 MG CAPSULE    Take 100 mg by mouth daily    HYDROCHLOROTHIAZIDE (HYDRODIURIL) 25 MG TABLET    Take 1 tablet by mouth daily    LISINOPRIL (PRINIVIL;ZESTRIL) 20 MG TABLET    Take 20 mg by mouth daily    NIFEDIPINE (ADALAT CC) 30 MG EXTENDED RELEASE TABLET    Take 1 tablet by mouth daily    OXYBUTYNIN (DITROPAN) 5 MG TABLET    Take 5 mg by mouth 2 times daily as needed (bladder spasms)    PANTOPRAZOLE (PROTONIX) 40 MG TABLET    Take 40 mg by mouth daily    SIMVASTATIN (ZOCOR) 40 MG TABLET    Take 40 mg by mouth nightly    TRAMADOL (ULTRAM) 50 MG TABLET    Take 50 mg by mouth every 6 hours as needed. TRAZODONE (DESYREL) 50 MG TABLET    Take 50 mg by mouth nightly as needed for Sleep       ALLERGIES     Pcn [penicillins]    FAMILY HISTORY           Problem Relation Age of Onset    High Blood Pressure Mother     High Blood Pressure Father     High Blood Pressure Daughter     Cancer Son      Family Status   Relation Name Status    Mother      Father      Belinda  (Not Specified)    Son          SOCIAL HISTORY      reports that she has been smoking cigarettes. She has a 12.50 pack-year smoking history. She has never used smokeless tobacco. She reports that she does not drink alcohol or use drugs.     PHYSICAL EXAM    (up to 7 for stenosis or branch occlusion in the   cervical carotid arteries. 5. Moderate focal stenosis of M2 branch of left middle cerebral artery. Mild   stenosis within left M1 segment. 6. 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 CT HEAD WO CONTRAST   Preliminary Result   No acute intracranial abnormality. Mild prominence of the pituitary gland is not significantly changed dating   back to 11/1/2018. Underlying adenoma would be difficult to exclude. If   clinically indicated this could be further evaluated with MRI. Critical results were called by Dr. Shane Jordan to [address] [place] [address] on   1/19/2021 at 08:32.              LABS:  Labs Reviewed   CBC WITH AUTO DIFFERENTIAL - Abnormal; Notable for the following components:       Result Value    WBC 14.3 (*)     RBC 6.66 (*)     Hemoglobin 17.0 (*)     Hematocrit 53.0 (*)     MCV 79.6 (*)     MCH 25.6 (*)     RDW 17.0 (*)     Neutrophils Absolute 12.9 (*)     Lymphocytes Absolute 0.7 (*)     All other components within normal limits    Narrative:     Performed at:  [address] [employer] [address]   Phone [phone]   COMPREHENSIVE METABOLIC PANEL W/ REFLEX TO MG FOR LOW K - Abnormal; Notable for the following components:    Sodium 135 (*)     Chloride 92 (*)     Anion Gap 21 (*)     Glucose 269 (*)     BUN 21 (*)     CREATININE 1.4 (*)     GFR Non- 36 (*)     GFR  44 (*)     Total Protein 9.0 (*)     Albumin/Globulin Ratio 0.8 (*)     All other components within normal limits    Narrative:     Performed at:  [address] [employer] [address]   Phone [phone]   TROPONIN - Abnormal; Notable for the following components:    Troponin 0.02 (*)     All other components within normal limits    Narrative:     Performed at:  [facility]  [address] [place] [address] Anna Marie Jojo  [place] [address] [address]   Phone [phone]   URINE RT REFLEX TO CULTURE - Abnormal; Notable for the following components:    Clarity, UA TURBID (*)     Glucose, Ur 100 (*)     Ketones, Urine 15 (*)     Blood, Urine MODERATE (*)     Protein, UA >=300 (*)     Leukocyte Esterase, Urine MODERATE (*)     All other components within normal limits    Narrative:     Performed at:  [facility]  [address] [place], [address] [address]   Phone [phone]   BRAIN NATRIURETIC PEPTIDE - Abnormal; Notable for the following components:    Pro-BNP 3,078 (*)     All other components within normal limits    Narrative:     Performed at:  [facility]  [address] [place], [address] [facility] [address]   Phone [phone]   LACTATE, SEPSIS - Abnormal; Notable for the following components:    Lactic Acid, Sepsis 5.9 (*)     All other components within normal limits    Narrative:     Josefayad Elio tel. [phone],  Chemistry results called to and read back by Benny Castaneda RN, 01/19/2021  09:36, by [facility]  Performed at:  [facility]  [address] [place], [address] [address]   Phone [phone]   LACTATE, SEPSIS - Abnormal; Notable for the following components:    Lactic Acid, Sepsis 5.9 (*)     All other components within normal limits    Narrative:     Silas Baeroring tel. [phone],  Chemistry results called to and read back by Benny Castaneda RN, 01/19/2021  11:04, by [facility]  Performed at:  [facility]  [address] [place], [place]   Phone [phone]   MICROSCOPIC URINALYSIS - Abnormal; Notable for the following components:    WBC, UA 13 (*)     Epithelial Cells, UA 19 (*)     All other components within normal limits    Narrative:     Performed at:  [facility]  [address] [place], [place]   Phone [phone]   POCT GLUCOSE - Abnormal; Notable for the following components:    POC Glucose 220 (*)     All other components within normal limits    Narrative:     Performed at:  [facility]  [address] [facility] [address]   Phone [phone]   CULTURE, BLOOD 1   CULTURE, BLOOD 2   CULTURE, URINE   PROTIME-INR    Narrative:     Performed at:  [facility]  [address] [facility] [address]   Phone [phone]    Narrative:     Performed at:  [facility]  [address] [facility] [address]   Phone [phone]   BLOOD OCCULT STOOL DIAGNOSTIC   LACTATE, SEPSIS   TROPONIN   TROPONIN   POCT GLUCOSE   POCT GLUCOSE   POCT GLUCOSE       All other labs were within normal range or not returned as of this dictation. EMERGENCY DEPARTMENT COURSE and DIFFERENTIAL DIAGNOSIS/MDM:   Vitals:    Vitals:    01/19/21 0800 01/19/21 0935   BP: (!) 188/96    Pulse: 116 108   Resp: 24 23   Temp: 102.3 °F (39.1 °C)    TempSrc: Rectal    SpO2: 95%    Weight: 109 lb 5.6 oz (49.6 kg)        The patient's condition in the ED was guarded. She presented with obvious deficits, as noted above, and initial temperature was 102.3 °F, with a heart rate of 116 and respiratory rate of 24. Blood pressure was 188/96. I consulted the stroke team, and they advised no immediate intervention. CT scans showed no acute abnormalities. Patient was given Tylenol and IV fluids and IV antibiotics in the emergency department. Work-up revealed a urinary tract infection, with acute kidney injury, leukocytosis, slightly elevated troponin, BNP greater than 3000, and lactate of 5.9. Patient's apparent neurological symptoms resolved while in the emergency department. I consulted the hospitalist, who agreed to accept and admit the patient.     CRITICAL CARE: There was a high probability of clinically significant/life threatening deterioration in this patient's condition, which required my urgent intervention. Total critical care time was 40 minutes. This excludes any time for separately reportable procedures. PROCEDURES:  None    FINAL IMPRESSION      1. Urinary tract infection with hematuria, site unspecified    2. Sepsis with acute renal failure without septic shock, due to unspecified organism, unspecified acute renal failure type [facility])          DISPOSITION/PLAN   DISPOSITION Decision To Admit 01/19/2021 09:43:08 AM      PATIENT REFERRED TO:  No follow-up provider specified.     DISCHARGE MEDICATIONS:  New Prescriptions    No medications on file       (Please note that portions of this note were completed with a voice recognition program.  Efforts were made to edit the dictations but occasionally words are mis-transcribed.)    Marly Wilson, [address] [place] [address]  01/19/21 1149

## 2021-01-19 NOTE — ED PROVIDER NOTES
I independently examined and evaluated Stephanie Meaz. In brief, patient is a 69-year-old female who presents to the emergency department with altered mental status. Per EMS, patient's last known normal was 8 PM last night. Patient presented with left-sided weakness. Per family, patient has baseline left arm weakness but now has left-sided leg and arm weakness and not looking towards the left. NIH Stroke Scale   Time: at arrival   Person Administering Scale: Dimple Countess     1a  Level of consciousness: 1=not alert but arousable by minor stimulation to obey, answer or respond   1b. LOC questions:  2=Performs neither task correctly   1c. LOC commands: 1=Performs one task correctly   2. Best Gaze: 1=partial gaze palsy   3. Visual: 1=Partial hemianopia   4. Facial Palsy: 1=Minor paralysis (flattened nasolabial fold, asymmetric on smiling)   5a. Motor left arm: 4=No movement   5b. Motor right arm: 0=No drift, limb holds 90 (or 45) degrees for full 10 seconds   6a. motor left le=Some effort against gravity, limb cannot get to or maintain (if cured) 90 (or 45) degrees, drifts down to bed, but has some effort against gravity   6b  Motor right le=No drift, limb holds 90 (or 45) degrees for full 10 seconds   7. Limb Ataxia: 0=Absent   8. Sensory: 0=Normal; no sensory loss   9. Best Language:  3=Mute, global aphasia; no usable speech or auditory comprehension   10. Dysarthria: 1=Mild to moderate, patient slurs at least some words and at worst, can be understood with some difficulty   11. Extinction and Inattention: 0=No abnormality         Total:  17       Focused exam revealed ill-appearing female appearing confused with left arm contracture, left-sided leg weakness, and left gaze deficit. Given this, code stroke called. Per stroke, no TPA given patient not at the tpa window.   CT showed no acute intracranial bleed and occlusion of the left cervical artery with reconstitution of flow within the left

## 2021-01-19 NOTE — PROGRESS NOTES
Medication Reconciliation    List of medications patient is currently taking is complete. Source of information: 1. Conversation with patient's family at bedside                                      2. EPIC records                                       3. Patient's pill bottles at bedside     Allergies  Pcn [penicillins]     Notes regarding home medications:   1. Patient did not receive any of her home medications prior to arrival to the emergency department today.     Sri ValentinD, BCPS  1/19/2021 10:49 AM

## 2021-01-19 NOTE — LETTER
[facility] Emergency Department  [address] [facility] [address]  Phone: [phone]               January 19, 2021      To Whom It May Concern:    Marce Hylton was in our emergency department on 1/19/2021 with his family member   Sincerely,       Skye Fernandez RN         Signature:__________________________________

## 2021-01-19 NOTE — ED NOTES
Ok per [place] [place] for pt.  To have liquids if swallow screen negative     Nida Beckman RN  01/19/21 3485

## 2021-01-19 NOTE — ED NOTES
Pt more alert. Clear speech noted, c/o being cold and asking for blanket.  Pt following command     Faheem Stephenson, RN  01/19/21 6581

## 2021-01-19 NOTE — ED NOTES
Pt A&Ox3, pt refusing blood draw for second blood  States \"marisa been stick so much, no more\"    EDPA aware      Yaron Witt, [place]  01/19/21 6084

## 2021-01-19 NOTE — ED NOTES
Dr. Ryan Nguyen at bedside.  Pt BP discussed, try oral BP medication first      Yaneth Arevalo RN  01/19/21 4839

## 2021-01-20 ENCOUNTER — APPOINTMENT (OUTPATIENT)
Dept: MRI IMAGING | Age: 81
DRG: 871 | End: 2021-01-20
Payer: MEDICARE

## 2021-01-20 LAB
ANION GAP SERPL CALCULATED.3IONS-SCNC: 13 MMOL/L (ref 3–16)
BASOPHILS ABSOLUTE: 0 K/UL (ref 0–0.2)
BASOPHILS RELATIVE PERCENT: 0.3 %
BUN BLDV-MCNC: 19 MG/DL (ref 7–20)
CALCIUM SERPL-MCNC: 8.7 MG/DL (ref 8.3–10.6)
CHLORIDE BLD-SCNC: 99 MMOL/L (ref 99–110)
CO2: 22 MMOL/L (ref 21–32)
CREAT SERPL-MCNC: 1 MG/DL (ref 0.6–1.2)
EOSINOPHILS ABSOLUTE: 0 K/UL (ref 0–0.6)
EOSINOPHILS RELATIVE PERCENT: 0 %
GFR AFRICAN AMERICAN: >60
GFR NON-AFRICAN AMERICAN: 53
GLUCOSE BLD-MCNC: 107 MG/DL (ref 70–99)
GLUCOSE BLD-MCNC: 116 MG/DL (ref 70–99)
GLUCOSE BLD-MCNC: 120 MG/DL (ref 70–99)
GLUCOSE BLD-MCNC: 147 MG/DL (ref 70–99)
GLUCOSE BLD-MCNC: 97 MG/DL (ref 70–99)
HCT VFR BLD CALC: 44.6 % (ref 36–48)
HEMOGLOBIN: 14.4 G/DL (ref 12–16)
LACTIC ACID: 2.2 MMOL/L (ref 0.4–2)
LYMPHOCYTES ABSOLUTE: 1 K/UL (ref 1–5.1)
LYMPHOCYTES RELATIVE PERCENT: 9.2 %
MAGNESIUM: 2.1 MG/DL (ref 1.8–2.4)
MCH RBC QN AUTO: 25.9 PG (ref 26–34)
MCHC RBC AUTO-ENTMCNC: 32.2 G/DL (ref 31–36)
MCV RBC AUTO: 80.4 FL (ref 80–100)
MONOCYTES ABSOLUTE: 1.1 K/UL (ref 0–1.3)
MONOCYTES RELATIVE PERCENT: 10.1 %
NEUTROPHILS ABSOLUTE: 8.4 K/UL (ref 1.7–7.7)
NEUTROPHILS RELATIVE PERCENT: 80.4 %
PDW BLD-RTO: 17 % (ref 12.4–15.4)
PERFORMED ON: ABNORMAL
PERFORMED ON: NORMAL
PLATELET # BLD: 235 K/UL (ref 135–450)
PMV BLD AUTO: 7 FL (ref 5–10.5)
POTASSIUM SERPL-SCNC: 2.8 MMOL/L (ref 3.5–5.1)
POTASSIUM SERPL-SCNC: 3.7 MMOL/L (ref 3.5–5.1)
PROCALCITONIN: 1.04 NG/ML (ref 0–0.15)
RBC # BLD: 5.55 M/UL (ref 4–5.2)
REASON FOR REJECTION: NORMAL
REJECTED TEST: NORMAL
SODIUM BLD-SCNC: 134 MMOL/L (ref 136–145)
WBC # BLD: 10.4 K/UL (ref 4–11)

## 2021-01-20 PROCEDURE — 84145 PROCALCITONIN (PCT): CPT

## 2021-01-20 PROCEDURE — 84132 ASSAY OF SERUM POTASSIUM: CPT

## 2021-01-20 PROCEDURE — 83735 ASSAY OF MAGNESIUM: CPT

## 2021-01-20 PROCEDURE — 6370000000 HC RX 637 (ALT 250 FOR IP): Performed by: FAMILY MEDICINE

## 2021-01-20 PROCEDURE — 2060000000 HC ICU INTERMEDIATE R&B

## 2021-01-20 PROCEDURE — 2580000003 HC RX 258: Performed by: FAMILY MEDICINE

## 2021-01-20 PROCEDURE — 6370000000 HC RX 637 (ALT 250 FOR IP): Performed by: NURSE PRACTITIONER

## 2021-01-20 PROCEDURE — 36415 COLL VENOUS BLD VENIPUNCTURE: CPT

## 2021-01-20 PROCEDURE — 83605 ASSAY OF LACTIC ACID: CPT

## 2021-01-20 PROCEDURE — 6360000002 HC RX W HCPCS: Performed by: FAMILY MEDICINE

## 2021-01-20 PROCEDURE — 97530 THERAPEUTIC ACTIVITIES: CPT

## 2021-01-20 PROCEDURE — 97129 THER IVNTJ 1ST 15 MIN: CPT

## 2021-01-20 PROCEDURE — 97535 SELF CARE MNGMENT TRAINING: CPT

## 2021-01-20 PROCEDURE — 80048 BASIC METABOLIC PNL TOTAL CA: CPT

## 2021-01-20 PROCEDURE — 97116 GAIT TRAINING THERAPY: CPT

## 2021-01-20 PROCEDURE — 85025 COMPLETE CBC W/AUTO DIFF WBC: CPT

## 2021-01-20 PROCEDURE — 97166 OT EVAL MOD COMPLEX 45 MIN: CPT

## 2021-01-20 PROCEDURE — 70551 MRI BRAIN STEM W/O DYE: CPT

## 2021-01-20 PROCEDURE — 97163 PT EVAL HIGH COMPLEX 45 MIN: CPT

## 2021-01-20 PROCEDURE — 92610 EVALUATE SWALLOWING FUNCTION: CPT

## 2021-01-20 PROCEDURE — 94760 N-INVAS EAR/PLS OXIMETRY 1: CPT

## 2021-01-20 RX ORDER — LANOLIN ALCOHOL/MO/W.PET/CERES
3 CREAM (GRAM) TOPICAL NIGHTLY PRN
Status: DISCONTINUED | OUTPATIENT
Start: 2021-01-20 | End: 2021-01-26 | Stop reason: HOSPADM

## 2021-01-20 RX ORDER — HYDRALAZINE HYDROCHLORIDE 25 MG/1
25 TABLET, FILM COATED ORAL EVERY 8 HOURS PRN
Status: DISCONTINUED | OUTPATIENT
Start: 2021-01-20 | End: 2021-01-21

## 2021-01-20 RX ADMIN — POTASSIUM BICARBONATE 40 MEQ: 782 TABLET, EFFERVESCENT ORAL at 20:10

## 2021-01-20 RX ADMIN — LEVOFLOXACIN 500 MG: 5 INJECTION, SOLUTION INTRAVENOUS at 05:30

## 2021-01-20 RX ADMIN — PANTOPRAZOLE SODIUM 40 MG: 40 TABLET, DELAYED RELEASE ORAL at 10:18

## 2021-01-20 RX ADMIN — ACETAMINOPHEN 650 MG: 325 TABLET ORAL at 21:03

## 2021-01-20 RX ADMIN — SODIUM CHLORIDE, PRESERVATIVE FREE 10 ML: 5 INJECTION INTRAVENOUS at 20:10

## 2021-01-20 RX ADMIN — ATORVASTATIN CALCIUM 20 MG: 20 TABLET, FILM COATED ORAL at 20:10

## 2021-01-20 RX ADMIN — ENOXAPARIN SODIUM 30 MG: 30 INJECTION SUBCUTANEOUS at 10:21

## 2021-01-20 RX ADMIN — POTASSIUM BICARBONATE 40 MEQ: 782 TABLET, EFFERVESCENT ORAL at 10:17

## 2021-01-20 RX ADMIN — Medication 3 MG: at 22:15

## 2021-01-20 RX ADMIN — NIFEDIPINE 30 MG: 30 TABLET, EXTENDED RELEASE ORAL at 10:18

## 2021-01-20 ASSESSMENT — PAIN SCALES - GENERAL
PAINLEVEL_OUTOF10: 7
PAINLEVEL_OUTOF10: 0
PAINLEVEL_OUTOF10: 0
PAINLEVEL_OUTOF10: 5

## 2021-01-20 ASSESSMENT — PAIN DESCRIPTION - LOCATION: LOCATION: LEG

## 2021-01-20 ASSESSMENT — PAIN DESCRIPTION - FREQUENCY
FREQUENCY: INTERMITTENT
FREQUENCY: INTERMITTENT

## 2021-01-20 ASSESSMENT — PAIN DESCRIPTION - PROGRESSION: CLINICAL_PROGRESSION: GRADUALLY IMPROVING

## 2021-01-20 ASSESSMENT — PAIN DESCRIPTION - ORIENTATION: ORIENTATION: LEFT

## 2021-01-20 ASSESSMENT — PAIN DESCRIPTION - ONSET: ONSET: GRADUAL

## 2021-01-20 NOTE — PROGRESS NOTES
Occupational Therapy   Occupational Therapy Initial Assessment  Should patient be discharged prior to another treatment session, this note shall serve as the discharge summary. Date: 2021   Patient Name: Franky Medina  MRN: [id number]     : 1940    Date of Service: 2021    Discharge Recommendations:  3-5 sessions per week, Patient would benefit from continued therapy after discharge       Assessment   Performance deficits / Impairments: Decreased functional mobility ; Decreased endurance;Decreased ADL status; Decreased balance;Decreased strength;Decreased high-level IADLs;Decreased safe awareness;Decreased cognition;Decreased fine motor control;Decreased ROM  Assessment: Pt presents with AMS and found to have UTI/sepsis. Pt is from home with her son and has HHA 3 days a week while son is working. Pt was independent with functional transfers at home and has some assist for self care from son and HHA. Currently required max A of 2 for transfers and ambulation and with significant confusion. Recommend cont OT to increase independence with self care and functional mobility for d/c home with her son. Currently would benefit from low intensity therapy, 3-5 x week. Cont to assess progress as infection clears. Prognosis: Good  Decision Making: Medium Complexity  History: see above  Exam: mobility, self care  Assistance / Modification: assist of 2  OT Education: OT Role;Plan of Care;ADL Adaptive Strategies;Transfer Training;Orientation; Family Education  Barriers to Learning: confusion  REQUIRES OT FOLLOW UP: Yes  Activity Tolerance  Activity Tolerance: Treatment limited secondary to decreased cognition;Treatment limited secondary to agitation  Safety Devices  Safety Devices in place: Yes  Type of devices: Left in chair;Nurse notified;Call light within reach; Chair alarm in place           Patient Diagnosis(es): The primary encounter diagnosis was Urinary tract infection with hematuria, site unspecified. A diagnosis of Sepsis with acute renal failure without septic shock, due to unspecified organism, unspecified acute renal failure type [facility]) was also pertinent to this visit. has a past medical history of CARROLL positive, Arthritis, Bleeding stomach ulcer, Diverticulitis, GERD (gastroesophageal reflux disease), Hypertension, Stroke ([address] and Tobacco abuse.   has a past surgical history that includes Colonoscopy (2011); Hysterectomy, total abdominal (1970's); Cholecystectomy (2014); Hemorrhoid surgery; and Colonoscopy (N/A, 12/31/2019). Restrictions  Restrictions/Precautions  Restrictions/Precautions: Fall Risk  Position Activity Restriction  Other position/activity restrictions: pureed diet, residual left weakness    Subjective   General  Chart Reviewed: Yes, Progress Notes, History and Physical, Previous Admission, Orders  Patient assessed for rehabilitation services?: Yes  Additional Pertinent Hx: Per Dr. Ivroy Reinoso, Jessica Guerra is 79 y.o. female with PMH CVA, HTN, GERD, GIB 2/2 PUD, tobacco abuse who presented to [facility] due to decrease responsiveness. History taken per chart review, RN, and son at bedside. Patient has history CVA w/ residual left arm and leg weakness. Last seen well at 8 pm last night, this morning her son found her with worsening left sided weakness, confusion, slurred speech and decreased responsiveness. EMS found patient with serum glucose too low to read. She received 15 g oral glucose with improvement in her blood sugar level. Patient started improving after she was given gucose, she is more alert and speaking without signifcant slurr but still is confused. Noted to have fever of 102 and started on abx in ED for sepsis due to UTI.  Patient unable to given history, is following some commands\"  Family / Caregiver Present: Yes(son present)  Referring Practitioner: Dr. Ivory Reinoso  Diagnosis: UTI, sepsis  Subjective  Subjective: Seen in room, fluccuates between cooperative and noncooperative. Son agreed for patient to participate, no complaints of pain    Social/Functional History  Social/Functional History  Lives With: Son(Julio, in very good health. Works part time 8AM-12PM M-F)  Type of Home: [address] [place] [address] Drive: One level  Home Access: Stairs to enter with rails  Entrance Stairs - Number of Steps: 6  Entrance Stairs - Rails: Left  Bathroom Shower/Tub: Tub/Shower unit, Shower chair with back  Bathroom Toilet: Standard  Bathroom Equipment: Grab bars around toilet  Home Equipment: 4 wheeled walker, Cane, Rolling walker, [address] Help From: Personal care attendant(3 days a week from [employer]. Helps with meds and cleaning up around the house. Goes to grocery store for patient)  ADL Assistance: Needs assistance(assistance from son for sponge bath and for dressing.   Son assists with toileting intermittently)  Homemaking Assistance: Needs assistance(total assist for all.  she will go to kitchen to get fruit)  Ambulation Assistance: Independent(with wheeled walker)  Transfer Assistance: Independent  Active : No  Patient's  Info: son drives  Additional Comments: son provides all meals       Objective   Vision: Within Functional Limits  Hearing: Within functional limits    Orientation  Overall Orientation Status: Within Functional Limits  Observation/Palpation  Posture: Fair  Balance  Sitting Balance: Contact guard assistance  Standing Balance: Maximum assistance(Assist of 2 to stand, less cooperative or following directions, son assisting)  Functional Mobility  Functional - Mobility Device: Rolling Walker(better with HHA of 2 than with RW this session due to confusion and not holding onto RW correctly)  Activity: To/from bathroom  Assist Level: Maximum assistance  Functional Mobility Comments: REcommend shira franklin with RN  ADL  Feeding: Minimal assistance(son feeding her)  LE Dressing: Maximum assistance;Dependent/Total  Toileting: Maximum assistance  Additional Comments: Pt incontinent before getting to toilet, max A to pull on depends. Pt with limited following of commands. Tone RUE  RUE Tone: Normotonic  Tone LUE  LUE Tone: Hypertonic  Tone Description: some contractures due to old stroke     Bed mobility  Supine to Sit: Maximum assistance;2 Person assistance  Sit to Supine: Unable to assess  Scootin Person assistance;Maximal assistance  Transfers  Sit to stand: 2 Person assistance;Maximum assistance(max A of 2 to stand due to confusion and lack of initiation)  Vision - Basic Assessment  Prior Vision: No visual deficits  Cognition  Overall Cognitive Status: Exceptions  Arousal/Alertness: Delayed responses to stimuli  Following Commands: Follows one step commands with repetition; Follows one step commands with increased time  Attention Span: Attends with cues to redirect; Difficulty dividing attention  Memory: Decreased recall of biographical Information;Decreased recall of precautions;Decreased recall of recent events;Decreased short term memory  Safety Judgement: Decreased awareness of need for assistance;Decreased awareness of need for safety  Problem Solving: Assistance required to generate solutions;Assistance required to implement solutions  Insights: Decreased awareness of deficits  Initiation: Requires cues for all  Sequencing: Requires cues for all        Sensation  Overall Sensation Status: WFL        LUE AROM (degrees)  LUE General AROM: contractures in elbow and digits and shoulder.   Difficulty fully assessing due to lack of following commands  RUE AROM (degrees)  RUE AROM : WFL  LUE Strength  L Hand General: 2+/5  RUE Strength  Gross RUE Strength: WFL                   Plan   Plan  Times per week: 3-5 x  Times per day: Daily  Plan weeks: 1  Current Treatment Recommendations: Strengthening, Patient/Caregiver Education & Training, Equipment Evaluation, Education, & procurement, Balance Training, Functional Mobility Training, Cognitive Reorientation, Endurance Training, Safety Education & Training, Self-Care / ADL         OutComes Score       Michelle Javier scored a 9/24 on the AM-PAC ADL Inpatient form. Current research shows that an AM-PAC score of 17 or less is typically not associated with a discharge to the patient's home setting. Based on the patient's AM-PAC score and their current ADL deficits, it is recommended that the patient have 3-5 sessions per week of Occupational Therapy at d/c to increase the patient's independence. Please see assessment section for further patient specific details. If patient discharges prior to next session this note will serve as a discharge summary. Please see below for the latest assessment towards goals.                                               AM-PAC Score        AM-PAC Inpatient Daily Activity Raw Score: 9 (01/20/21 1316)  AM-PAC Inpatient ADL T-Scale Score : 25.33 (01/20/21 1316)  ADL Inpatient CMS 0-100% Score: 79.59 (01/20/21 1316)  ADL Inpatient CMS G-Code Modifier : CL (01/20/21 1316)    Goals  Short term goals  Time Frame for Short term goals: 1 week  Short term goal 1: Pt will be Max A of 1 with functional transfers  Short term goal 2: pt will be mod A of 1 with functional mobility  Short term goal 3: Pt will be mod A with bed mobility  Short term goal 4: Pt will be mod A with toileting  Short term goal 5: Pt will be mod A with bathing and dressing  Long term goals  Time Frame for Long term goals : TBD at next level of care  Patient Goals   Patient goals : Unable to state, son's goal is to get patient transferring better and get cognition improved in order to return home       Therapy Time   Individual Concurrent Group Co-treatment   Time In 1030         Time Out 1130         Minutes 60         Timed Code Treatment Minutes: Devora Queen 48, OT

## 2021-01-20 NOTE — PROGRESS NOTES
improvement in her blood sugar level. Patient started improving after she was given gucose, she is more alert and speaking without signifcant slurr but still is confused. Noted to have fever of 102 and started on abx in ED for sepsis due to UTI. Patient unable to given history, is following some commands     1/19/2021 CT Head  Impression   No acute intracranial abnormality. Mild prominence of the pituitary gland is not significantly changed dating   back to 11/1/2018. [place] adenoma would be difficult to exclude.  If   clinically indicated this could be further evaluated with MRI. Critical results were called by Dr. Brett Roldan to [facility] on   1/19/2021 at 08:32.     1/19/2021 CTA Head  Impression   1. 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  This   is presumed chronic in nature. 2. Mild-to-moderate stenosis at the origin of the right vertebral artery. 3. Moderate focal stenosis of the left subclavian artery. 4. No hemodynamically significant stenosis or branch occlusion in the   cervical carotid arteries. 5. Moderate focal stenosis of M2 branch of left middle cerebral artery.  Mild   stenosis within left M1 segment. 6. 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 1/19/2021 Chest XR     Impression   No convincing evidence for acute cardiopulmonary pathology. Findings suggestive of COPD. Current Diet level:  Current Diet : NPO  Current Liquid Diet : NPO      Primary Complaint   MD order for SLP Evaluation and Treatment. RN reports npo pending evaluation. RN reporting pt more alert this am; but residual variable compliance with nsg care.     Reason for Referral  Malik Crockett was referred for a bedside swallow evaluation to assess the efficiency of her swallow function, identify signs and symptoms of aspiration and make recommendations regarding safe dietary consistencies, effective compensatory strategies, and safe eating environment. Assessment Impression  1. Pt was awake in bed. Pt was oriented to self and partially to place. Pt was verbally responsive but a poor historian and with variable agitation. Pt followed simple commands 70% of the time with moderate re-direct/coaxing (appeared more related to cognitive/behavioral versus processing problems)  Pt is on room air  Pre feeding Oral Motor Mechanism exam: pt with variable compliance wit instructions for assessment of volitional oral motor rom. Pt with fairly symmetrical labial/buccal/lingual / palatal rom; but rom generally reduced with generalized weakness. Oral reflexes: present. Voice: unremarkable. Volitional cough: weak; Volitional swallow delayed  Pt required total feed. Pt did not attempt or follow directions for holding cup or utensil. Left sided motor (left upper extremity) weakness noted from previous CVA    2. Dysphagia Diagnosis: Oral > pharyngeal Dysphagia characterized by prolonged, at times incomplete, mastication; variable reduced rate of A-P oral transit and delayed initiation of swallow with potential reduced laryngeal elevation. Pt spit out or held 75% of textured food consistencies. No overt clinical s/s of aspiration post swallow or voice quality changes post swallow or complaints post swallow of thin and thick liquids and puree. Pt cognition can impact. Will suggest close monitor of dysphagia puree with thin liquids with ongoing assessment for readiness of diet upgrade. Discussed with RN    3. Pt with documented history of GERD. GERD precautions recommended  Discussed with RN    4. Pt did not participate in SLP evaluation this date/time so discontinued. Pt 's oral motor speech is audible/intelligible. Pt is conveying basic needs/wants but can have conflicting wants. Pt with temporal and spatial orientation deficits.  Ongoing assessment warranted;unless otherwise notified. Dysphagia Outcome Severity Scale: Level 3: Moderate dysphagia- Total assistance, supervision or strategies. Two or more diet consistencies restricted     Treatment Plan  Requires SLP Intervention: Yes  Duration/Frequency of Treatment: 3-5 times a week while on acute medical floor;additional recommendations to be determined at d/c  D/C Recommendations: To be determined       Recommended Diet and Intervention  Diet Solids Recommendation: Dysphagia Pureed (Dysphagia I)  Liquid Consistency Recommendation: Thin  Recommended Form of Meds: Crushed in puree as able  Recommendations: SLP /cognitive evaluation planned;unless otherwise notified  Therapeutic Interventions: Diet tolerance monitoring;Patient/Family education; Therapeutic PO trials with SLP(with diet upgrade when/if warranted)    Compensatory Swallowing Strategies   Upright as possible for all oral intake;Assist feed; Total feed;Swallow 2 times per bite/sip; Remain upright for 30-45 minutes after meals(oral care)    Treatment/Goals   Pt did state she wanted something to eat ; something warm  Dysphagia Goals: The patient will tolerate dysphagia puree with thin liquid diet without observed clinical signs of aspiration; The patient/caregiver will demonstrate understanding of compensatory strategies for improved swallowing safety. Pt will tolerate therapy trials of dysphagia minced and moist food consistencies 10/10 without overt clinical s/s of aspiration and without oral pocketing    General  Chart Reviewed: Yes  Behavior/Cognition: Alert;Confused; Distractible;Requires cueing(Distractible with variable non-compliance with certain activities)  Respiratory Status: Room air  Follows Directions: Simple(moderate cues)  Dentition: Edentulous  Prior Dysphagia History: Pt was a poor historian. Pt denies any chewing or swallowing problems. Chart review did not yield any history of dysphagia instrumental assessments.  Will continue chart review    Patient Positioning: Upright in bed(80 degrees)    Consistencies Administered: Dysphagia Soft and Bite-Sized (Dysphagia III); Dysphagia Minced and Moist (Dysphagia II); Dysphagia Pureed (Dysphagia I); Ice Chips; Thin - teaspoon; Thin - cup; Thin - straw;Nectar - straw;Honey - straw;Nectar - teaspoon(pt would not hold cup. Pt would not hold spoon. Total feed. Holding of textured foods in mouth/spit out <75% of textured foods)    Pain: denied with exception of when positioning for DIEGO (SLP/RN/PCA)     Vision/Hearing  Vision  Vision: Within Functional Limits(for procedure. Pt with conflicting statements)  Hearing  Hearing: Within functional limits(for procedure. Pt with conflicting statements)    Oral Motor Deficits  Oral/Motor  Oral Motor: Exceptions to WFL(pt followed 25% of commands for assessment of volitional oral motor mechanism exam)  Labial ROM: (fairly symmetrical labial/buccal rom)  Labial Strength: (generalized weakness)  Lingual ROM: (limited assessment. limited rom for protrusion/elevation/lateralization)  Lingual Strength: Reduced  Lingual Coordination: Reduced  Velum: (fairly symmetrical rom)  Gag: (present gag and palatal)   Vocal Quality: (unremarkable)  Volitional Cough: Weak  Volitional Swallow: Delayed    Oral Phase Dysfunction  Oral Phase  Oral Phase: Exceptions  Oral Phase Dysfunction  Impaired Mastication: (prolonged and at times incomplete mastication. Pt spit out or held 75% of presentations of textured food)  Decreased Anterior to Posterior Transit: (textured foods)  Suspected Premature Bolus Loss: (suspect risk due to variable lingual mashing A-P oral transit of puree and semi soft puree)  Lingual/Palatal Residue: (textured foods (pt spit out or oral care completed 75% of presentations of textured food consistencies))     Indicators of Pharyngeal Phase Dysfunction   Pharyngeal Phase  Pharyngeal Phase: Exceptions  Indicators of Pharyngeal Phase Dysfunction  Delayed Swallow:  All  Decreased Laryngeal Elevation: (potential reduced laryngeal elevation)  Pharyngeal Phase   Pharyngeal: No overt clinical s/s of aspiration post swallow; no voice quality changes post swallow; no pt complaints post swallow    Prognosis  Prognosis  Prognosis for safe diet advancement: good(guarded 2/2 to medical DX;medical history co-morbidities; mental status and variable compliance with varied tasks this date/time)  Individuals consulted  Consulted and agree with results and recommendations: Patient;RN    Education  Patient Education Response: No evidence of learning  Safety Devices in place: Yes  Type of devices: All fall risk precautions in place; Bed alarm in place;Call light within reach;Nurse notified       Therapy Time  SLP Individual Minutes  Time In: 0740  Time Out: 0825  Minutes: 45   coded treatment time: 13       Signed  Krista JordanMS,CCC,SLP [id number]  Speech and Language Pathologist  1/20/2021 8:33 AM

## 2021-01-20 NOTE — PLAN OF CARE
Problem: Falls - Risk of:  Goal: Will remain free from falls  Description: Will remain free from falls  1/19/2021 2341 by Kamilah Villarreal RN  Outcome: Ongoing     Problem: Falls - Risk of:  Goal: Absence of physical injury  Description: Absence of physical injury  1/19/2021 2341 by Kamilah Villarreal RN  Outcome: Ongoing     Problem: Skin Integrity:  Goal: Will show no infection signs and symptoms  Description: Will show no infection signs and symptoms  1/19/2021 2341 by Kamilah Villarreal RN  Outcome: Ongoing     Problem: Skin Integrity:  Goal: Absence of new skin breakdown  Description: Absence of new skin breakdown  1/19/2021 2341 by Kamilah Villarreal RN  Outcome: Ongoing     Problem: Infection:  Goal: Will remain free from infection  Description: Will remain free from infection  Outcome: Ongoing     Problem: Safety:  Goal: Free from accidental physical injury  Description: Free from accidental physical injury  Outcome: Ongoing     Problem: Safety:  Goal: Free from intentional harm  Description: Free from intentional harm  Outcome: Ongoing     Problem: Daily Care:  Goal: Daily care needs are met  Description: Daily care needs are met  Outcome: Ongoing     Problem: Pain:  Goal: Patient's pain/discomfort is manageable  Description: Patient's pain/discomfort is manageable  Outcome: Ongoing     Problem: Skin Integrity:  Goal: Skin integrity will stabilize  Description: Skin integrity will stabilize  Outcome: Ongoing     Problem: Discharge Planning:  Goal: Patients continuum of care needs are met  Description: Patients continuum of care needs are met  Outcome: Ongoing

## 2021-01-20 NOTE — PROGRESS NOTES
position/activity restrictions: pureed diet, residual left weakness     Vision/Hearing  Vision: Within Functional Limits  Hearing: Within functional limits       Subjective  General  Chart Reviewed: Yes  Patient assessed for rehabilitation services?: Yes  Additional Pertinent Hx: per H&P, \"79 y.o. female with PMH CVA, HTN, GERD, GIB 2/2 PUD, tobacco abuse who presented to [facility] due to decrease responsiveness. History taken per chart review, RN, and son at bedside. Patient has history CVA w/ residual left arm and leg weakness. Last seen well at 8 pm last night, this morning her son found her with worsening left sided weakness, confusion, slurred speech and decreased responsiveness. EMS found patient with serum glucose too low to read. She received 15 g oral glucose with improvement in her blood sugar level. Patient started improving after she was given glucose, she is more alert and speaking without significant slurr but still is confused. Noted to have fever of 102 and started on abx in ED for sepsis due to UTI. Patient unable to given history, is following some commands\"  Response To Previous Treatment: Not applicable  Family / Caregiver Present: Yes(son)  Referring Practitioner: Myah Hancock MD  Referral Date : 01/19/21  Diagnosis: Sepsis with acute renal failure  Follows Commands: Impaired  Subjective  Subjective: Patient very sarcastic with staff. She did not want to participate with PT?OT and required max encouragement from son, Harini Benigno Screening  Patient Currently in Pain: Denies          Orientation  Orientation  Overall Orientation Status: Impaired  Orientation Level: Oriented to place;Oriented to person;Disoriented to situation;Disoriented to time     Social/Functional History  Social/Functional History  Lives With: SonJason, in very good health.  Works part time 8AM-12PM M-F)  Type of Home: [address] [place] [address] Drive: One level  Home Access: Stairs to enter with rails  Entrance Stairs - Number of Steps: 6  Entrance Stairs - Rails: Left  Bathroom Shower/Tub: Tub/Shower unit, Shower chair with back  Bathroom Toilet: Standard  Bathroom Equipment: Grab bars around toilet  Home Equipment: 4 wheeled walker, Cane, Rolling walker, [address] Help From: Personal care attendant(3 days a week from [place]. Helps with meds and cleaning up around the house. Goes to grocery store for patient)  ADL Assistance: Needs assistance(assistance from son for sponge bath and for dressing. Son assists with toileting intermittently)  Homemaking Assistance: Needs assistance(total assist for all.  she will go to kitchen to get fruit)  Ambulation Assistance: Independent(with wheeled walker)  Transfer Assistance: Independent  Active : No  Patient's  Info: son drives  Additional Comments: son provides all meals      Objective     Observation/Palpation  Posture: Fair    AROM RLE (degrees)  RLE General AROM: WFL from functional mobility, but patient would not actively perform ROM as she did not want to participate in therapy  AROM LLE (degrees)  LLE General AROM: WFL from functional mobility, but patient would not actively perform ROM as she did not want to participate in therapy  AROM RUE (degrees)  RUE General AROM: see OT note  AROM LUE (degrees)  LUE General AROM: see OT note    Strength RLE  Comment: Patient was not agreeable to MMT  Strength LLE  Comment: Patient was not agreeable to MMT     Motor Control  Gross Motor?: Exceptions     Sensation  Overall Sensation Status: WFL     Bed mobility  Supine to Sit: Maximum assistance;2 Person assistance  Sit to Supine: Unable to assess(Patient left up in chair upon end of session as son wanted patient up.   Patient did not want to stay up, but was agreeable once he was up in the chair)  Scootin Person assistance;Maximal assistance    Transfers  Sit to Stand: 2 Person Assistance;Maximum Assistance  Stand to sit: 2 Person Assistance;Maximum Assistance  Comment: Patient was max assist of two for toilet transfer. Patient extremely fearful of sitting back onto toilet. She was max assist of 2. Patient urinated on the floor as she would not sit back onto toilet despite son's and therapists' frequent encouragement to sit back. Patient was dependent for clean up and change of depends. Ambulation  Ambulation?: Yes  More Ambulation?: Yes  Ambulation 1  Surface: level tile  Device: Rolling Walker  Other Apparatus: (therapist manage IV pole)  Assistance: 2 Person assistance;Maximum assistance  Quality of Gait: Patient would not maintain  of walker with left hand. Strong posterior lean. Very fearful of falling. varying TICO. Distance: 10' to commode  Comments: Patient having difficulty following directions and guidance. Patient very frustrated with therapists and son as she felt they were not helping her despite max assist    Ambulation 2  Surface - 2: level tile  Device 2: (bilateral HHA)  Other Apparatus 2: (PT/OT still managing IV line)  Assistance 2: 2 Person assistance;Maximum assistance  Quality of Gait 2: Shifted to bilateral HHA as patient was not griping the walker with the left hand. Patient with improved upright and less posterior lean, though she continues to required assist of 2-3. Patient having difficulty focusing on ambulation. Continues to be very unsteady though better with bilateral HHA  Gait Deviations: Decreased step length;Decreased step height;Slow Jessica  Distance: 15' to bedside recliner  Comments: Patient unable to ambulate with one person assist at this time. Stairs/Curb  Stairs?: No       Balance  Posture: Fair  Sitting - Static: Fair  Sitting - Dynamic: Fair  Standing - Static: Poor  Standing - Dynamic: Poor  Comments: Patient was CGA to sit at the EOB but maintained a constant posterior lean. Poor standing balance with bilateral UE support and frequent posterior lean.  She could accept weight through her bilateral LE, but initially would not extend knees to stand up. Max cueing and encouragement to stand up. Plan   Plan  Times per week: 3-5 days/week  Current Treatment Recommendations: Strengthening, Balance Training, Functional Mobility Training, Transfer Training, Gait Training, Endurance Training  Safety Devices  Type of devices: All fall risk precautions in place, Call light within reach, Chair alarm in place, Gait belt, Left in chair(son, Nickolas Flies, present)  Restraints  Initially in place: No        AM-PAC Score  AM-PAC Inpatient Mobility Raw Score : 6 (01/20/21 1149)  AM-PAC Inpatient T-Scale Score : 23.55 (01/20/21 1149)  Mobility Inpatient CMS 0-100% Score: 100 (01/20/21 1149)  Mobility Inpatient CMS G-Code Modifier : CN (01/20/21 1149)          Goals  Short term goals  Time Frame for Short term goals: UPon discharge from inpatient PT  Short term goal 1: bed mobility with mod assist  Short term goal 2: Transfers with min-mod assist of one  Short term goal 3: Ambulate 22' with wheeled walker with min-mod assist of one  Long term goals  Time Frame for Long term goals : STG=LTG  Patient Goals   Patient goals : \"I just want to go home. \"       Therapy Time   Individual Concurrent Group Co-treatment   Time In 2213         Time Out 1140         Minutes 1200 Marianne Connell, [id number]

## 2021-01-20 NOTE — PROGRESS NOTES
bilaterally  Neurologic:  Chronic left sided weakness, clear speech  Psychiatric: Alert, impaired insight  Capillary Refill: Brisk,< 3 seconds   Peripheral Pulses: +2 palpable, equal bilaterally       Labs:   Recent Labs     01/19/21  0904 01/20/21  0826   WBC 14.3* 10.4   HGB 17.0* 14.4   HCT 53.0* 44.6    235     Recent Labs     01/19/21  0904 01/20/21  0826   * 134*   K 4.0 2.8*   CL 92* 99   CO2 22 22   BUN 21* 19   CREATININE 1.4* 1.0   CALCIUM 10.1 8.7     Recent Labs     01/19/21  0904   AST 33   ALT 11   BILITOT 0.7   ALKPHOS 110     Recent Labs     01/19/21  0904   INR 1.14     Recent Labs     01/19/21  0904 01/19/21  1224 01/19/21  1702   TROPONINI 0.02* 0.02* 0.02*       Urinalysis:      Lab Results   Component Value Date    NITRU Negative 01/19/2021    WBCUA 13 01/19/2021    BACTERIA 1+ 09/29/2018    RBCUA 0-2 01/19/2021    BLOODU MODERATE 01/19/2021    SPECGRAV 1.021 01/19/2021    GLUCOSEU 100 01/19/2021    GLUCOSEU NEGATIVE 07/10/2010       Radiology:  XR CHEST PORTABLE   Final Result   No convincing evidence for acute cardiopulmonary pathology. Findings suggestive of COPD. CTA HEAD NECK W CONTRAST   Final Result   1. 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 This   is presumed chronic in nature. 2. Mild-to-moderate stenosis at the origin of the right vertebral artery. 3. Moderate focal stenosis of the left subclavian artery. 4. No hemodynamically significant stenosis or branch occlusion in the   cervical carotid arteries. 5. Moderate focal stenosis of M2 branch of left middle cerebral artery. Mild   stenosis within left M1 segment. 6. 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 CT HEAD WO CONTRAST   Final Result   No acute intracranial abnormality.       Mild prominence of the pituitary gland is not significantly changed dating back to 11/1/2018. Underlying adenoma would be difficult to exclude. If   clinically indicated this could be further evaluated with MRI. Critical results were called by Dr. Sonny Peterson to [address] on   1/19/2021 at 08:32.          MRI BRAIN WO CONTRAST    (Results Pending)           Assessment/Plan:    Active Hospital Problems    Diagnosis    Sepsis ([facility] [address] [L34.6]   Acute metabolic encephalopathy  - due to hypoglycemia and Sepsis  - treat accordingly and monitor   - CT head negative for acute abnormality  - Speech thearpy - dysphagia diet  - MRI brain ordered given persistent worsening deficits and dysphagia  - neuro checks  - pt/ot       UTI  - urine culture - aerococcus species, pending  - on levaquin     Sepsis POA - improving  - afebrile today, leukocytosis resolved  - bl cx ngtd  - abx as above     Lactic acidosis   - trending down, c/w IVF     DANIEL   - improving   - hold ace-I, hctz  - monitor    Hypokalemia  - replacement ordered  - monitor level   - check mag level    Hypoglycemia  - BS stable on IVF, continue until tolerating PO intake  - protocol prn, monitor poc glucose levels     HTN - at goal  - c/w nifedipine  - change hydralazine to prn  - holding ace-I, hctz d/t daniel     HLD - statin     Hx cva - left sided weakness      DVT Prophylaxis: lovenox  Diet: DIET DYSPHAGIA PUREED;  Code Status: Full Code      Dispo - continue care    Radha Quarles MD

## 2021-01-21 ENCOUNTER — APPOINTMENT (OUTPATIENT)
Dept: CT IMAGING | Age: 81
DRG: 871 | End: 2021-01-21
Payer: MEDICARE

## 2021-01-21 LAB
ANION GAP SERPL CALCULATED.3IONS-SCNC: 13 MMOL/L (ref 3–16)
BASOPHILS ABSOLUTE: 0 K/UL (ref 0–0.2)
BASOPHILS RELATIVE PERCENT: 0.2 %
BUN BLDV-MCNC: 20 MG/DL (ref 7–20)
CALCIUM SERPL-MCNC: 8.6 MG/DL (ref 8.3–10.6)
CHLORIDE BLD-SCNC: 103 MMOL/L (ref 99–110)
CO2: 25 MMOL/L (ref 21–32)
CREAT SERPL-MCNC: 1 MG/DL (ref 0.6–1.2)
EKG ATRIAL RATE: 106 BPM
EKG DIAGNOSIS: NORMAL
EKG P-R INTERVAL: 92 MS
EKG Q-T INTERVAL: 394 MS
EKG QRS DURATION: 72 MS
EKG QTC CALCULATION (BAZETT): 523 MS
EKG R AXIS: 12 DEGREES
EKG T AXIS: -82 DEGREES
EKG VENTRICULAR RATE: 106 BPM
EOSINOPHILS ABSOLUTE: 0 K/UL (ref 0–0.6)
EOSINOPHILS RELATIVE PERCENT: 0.2 %
GFR AFRICAN AMERICAN: >60
GFR NON-AFRICAN AMERICAN: 53
GLUCOSE BLD-MCNC: 108 MG/DL (ref 70–99)
GLUCOSE BLD-MCNC: 85 MG/DL (ref 70–99)
GLUCOSE BLD-MCNC: 93 MG/DL (ref 70–99)
GLUCOSE BLD-MCNC: 99 MG/DL (ref 70–99)
HCT VFR BLD CALC: 40.8 % (ref 36–48)
HEMOGLOBIN: 13.4 G/DL (ref 12–16)
LYMPHOCYTES ABSOLUTE: 1.3 K/UL (ref 1–5.1)
LYMPHOCYTES RELATIVE PERCENT: 15.2 %
MCH RBC QN AUTO: 25.9 PG (ref 26–34)
MCHC RBC AUTO-ENTMCNC: 32.8 G/DL (ref 31–36)
MCV RBC AUTO: 78.9 FL (ref 80–100)
MONOCYTES ABSOLUTE: 0.9 K/UL (ref 0–1.3)
MONOCYTES RELATIVE PERCENT: 10.7 %
NEUTROPHILS ABSOLUTE: 6.2 K/UL (ref 1.7–7.7)
NEUTROPHILS RELATIVE PERCENT: 73.7 %
PDW BLD-RTO: 16.7 % (ref 12.4–15.4)
PERFORMED ON: ABNORMAL
PERFORMED ON: NORMAL
PERFORMED ON: NORMAL
PLATELET # BLD: 237 K/UL (ref 135–450)
PMV BLD AUTO: 7 FL (ref 5–10.5)
POTASSIUM SERPL-SCNC: 3.1 MMOL/L (ref 3.5–5.1)
RBC # BLD: 5.17 M/UL (ref 4–5.2)
SODIUM BLD-SCNC: 141 MMOL/L (ref 136–145)
WBC # BLD: 8.4 K/UL (ref 4–11)

## 2021-01-21 PROCEDURE — 36415 COLL VENOUS BLD VENIPUNCTURE: CPT

## 2021-01-21 PROCEDURE — 70450 CT HEAD/BRAIN W/O DYE: CPT

## 2021-01-21 PROCEDURE — 6370000000 HC RX 637 (ALT 250 FOR IP): Performed by: FAMILY MEDICINE

## 2021-01-21 PROCEDURE — 70498 CT ANGIOGRAPHY NECK: CPT

## 2021-01-21 PROCEDURE — 2580000003 HC RX 258: Performed by: FAMILY MEDICINE

## 2021-01-21 PROCEDURE — 92526 ORAL FUNCTION THERAPY: CPT

## 2021-01-21 PROCEDURE — 70460 CT HEAD/BRAIN W/DYE: CPT

## 2021-01-21 PROCEDURE — 6360000004 HC RX CONTRAST MEDICATION: Performed by: FAMILY MEDICINE

## 2021-01-21 PROCEDURE — 94760 N-INVAS EAR/PLS OXIMETRY 1: CPT

## 2021-01-21 PROCEDURE — 85025 COMPLETE CBC W/AUTO DIFF WBC: CPT

## 2021-01-21 PROCEDURE — 83036 HEMOGLOBIN GLYCOSYLATED A1C: CPT

## 2021-01-21 PROCEDURE — 97129 THER IVNTJ 1ST 15 MIN: CPT

## 2021-01-21 PROCEDURE — 6360000002 HC RX W HCPCS: Performed by: FAMILY MEDICINE

## 2021-01-21 PROCEDURE — 2580000003 HC RX 258: Performed by: INTERNAL MEDICINE

## 2021-01-21 PROCEDURE — 80048 BASIC METABOLIC PNL TOTAL CA: CPT

## 2021-01-21 PROCEDURE — 93010 ELECTROCARDIOGRAM REPORT: CPT | Performed by: INTERNAL MEDICINE

## 2021-01-21 PROCEDURE — 2060000000 HC ICU INTERMEDIATE R&B

## 2021-01-21 RX ORDER — HYDRALAZINE HYDROCHLORIDE 25 MG/1
25 TABLET, FILM COATED ORAL EVERY 8 HOURS SCHEDULED
Status: DISCONTINUED | OUTPATIENT
Start: 2021-01-22 | End: 2021-01-26 | Stop reason: HOSPADM

## 2021-01-21 RX ORDER — 0.9 % SODIUM CHLORIDE 0.9 %
500 INTRAVENOUS SOLUTION INTRAVENOUS ONCE
Status: COMPLETED | OUTPATIENT
Start: 2021-01-21 | End: 2021-01-21

## 2021-01-21 RX ORDER — TRAMADOL HYDROCHLORIDE 50 MG/1
50 TABLET ORAL EVERY 6 HOURS PRN
Status: DISCONTINUED | OUTPATIENT
Start: 2021-01-21 | End: 2021-01-26 | Stop reason: HOSPADM

## 2021-01-21 RX ORDER — ASPIRIN 81 MG/1
81 TABLET, CHEWABLE ORAL DAILY
Status: DISCONTINUED | OUTPATIENT
Start: 2021-01-22 | End: 2021-01-26 | Stop reason: HOSPADM

## 2021-01-21 RX ADMIN — SODIUM CHLORIDE 500 ML: 9 INJECTION, SOLUTION INTRAVENOUS at 13:29

## 2021-01-21 RX ADMIN — PANTOPRAZOLE SODIUM 40 MG: 40 TABLET, DELAYED RELEASE ORAL at 08:43

## 2021-01-21 RX ADMIN — TRAMADOL HYDROCHLORIDE 50 MG: 50 TABLET ORAL at 10:59

## 2021-01-21 RX ADMIN — SODIUM CHLORIDE, PRESERVATIVE FREE 10 ML: 5 INJECTION INTRAVENOUS at 08:44

## 2021-01-21 RX ADMIN — HYDRALAZINE HYDROCHLORIDE 25 MG: 25 TABLET, FILM COATED ORAL at 08:49

## 2021-01-21 RX ADMIN — ENOXAPARIN SODIUM 30 MG: 30 INJECTION SUBCUTANEOUS at 08:43

## 2021-01-21 RX ADMIN — CEFTRIAXONE 1000 MG: 1 INJECTION, POWDER, FOR SOLUTION INTRAMUSCULAR; INTRAVENOUS at 11:34

## 2021-01-21 RX ADMIN — HYDRALAZINE HYDROCHLORIDE 25 MG: 25 TABLET, FILM COATED ORAL at 17:40

## 2021-01-21 RX ADMIN — NIFEDIPINE 30 MG: 30 TABLET, EXTENDED RELEASE ORAL at 08:43

## 2021-01-21 RX ADMIN — ATORVASTATIN CALCIUM 20 MG: 20 TABLET, FILM COATED ORAL at 20:58

## 2021-01-21 RX ADMIN — SODIUM CHLORIDE, PRESERVATIVE FREE 10 ML: 5 INJECTION INTRAVENOUS at 20:58

## 2021-01-21 RX ADMIN — POTASSIUM BICARBONATE 40 MEQ: 782 TABLET, EFFERVESCENT ORAL at 09:35

## 2021-01-21 ASSESSMENT — PAIN SCALES - GENERAL
PAINLEVEL_OUTOF10: 0

## 2021-01-21 ASSESSMENT — PAIN DESCRIPTION - LOCATION: LOCATION: HEAD

## 2021-01-21 ASSESSMENT — PAIN DESCRIPTION - PAIN TYPE: TYPE: ACUTE PAIN

## 2021-01-21 NOTE — PROGRESS NOTES
Hospitalist Progress Note      PCP: [facility]    Date of Admission: 1/19/2021      Hospital Course: Bettina Leon is 79 y.o. female with PMH CVA, HTN, GERD, GIB 2/2 PUD, tobacco abuse who presented to [facility] with worsening left sided weakness, confusion, slurred speech and decreased responsiveness. She was treated by EMS for hypoglycemia, and was admitted for sepsis 2/2 UTI. Subjective:    Patient seen and examined. Seen at bedside during stroke alert, not speaking, not following commands, looking around. worsening weakness    Medications:  Reviewed    Infusion Medications    dextrose      dextrose 5 % and 0.45 % NaCl Stopped (01/20/21 7054)     Scheduled Medications    cefTRIAXone (ROCEPHIN) IV  1,000 mg Intravenous Q24H    sodium chloride flush  10 mL Intravenous 2 times per day    enoxaparin  30 mg Subcutaneous Daily    NIFEdipine  30 mg Oral Daily    pantoprazole  40 mg Oral Daily    atorvastatin  20 mg Oral Nightly     PRN Meds: traMADol, hydrALAZINE, melatonin, sodium chloride flush, acetaminophen **OR** acetaminophen, glucose, dextrose, glucagon (rDNA), dextrose, labetalol      Intake/Output Summary (Last 24 hours) at 1/21/2021 1225  Last data filed at 1/21/2021 0943  Gross per 24 hour   Intake 1184 ml   Output --   Net 1184 ml       Physical Exam Performed:    BP (!) 169/77   Pulse 96   Temp 98.7 °F (37.1 °C)   Resp 16   Ht 5' 3\" (1.6 m)   Wt 97 lb 14.2 oz (44.4 kg)   SpO2 94%   BMI 17.34 kg/m²       General appearance: No apparent distress, alert   HEENT: Conjunctivae/corneas clear, neck supple w/ full ROM  Respiratory:  Normal respiratory effort. Clear to auscultation, bilaterally without Rales/Wheezes/Rhonchi. Cardiovascular: Regular rate and rhythm, normal S1/S2, no murmurs  Abdomen: Soft, non-tender, non-distended with normal bowel sounds.   Musculoskeletal: No edema bilaterally  Neurologic:  Chronic left sided weakness, aphasia, worsening weakness, not following commands  Psychiatric: Alert, impaired insight  Capillary Refill: Brisk,< 3 seconds   Peripheral Pulses: +2 palpable, equal bilaterally       Labs:   Recent Labs     01/19/21  0904 01/20/21  0826 01/21/21  0515   WBC 14.3* 10.4 8.4   HGB 17.0* 14.4 13.4   HCT 53.0* 44.6 40.8    235 237     Recent Labs     01/19/21  0904 01/20/21  0826 01/20/21  2122 01/21/21  0515   * 134*  --  141   K 4.0 2.8* 3.7 3.1*   CL 92* 99  --  103   CO2 22 22  --  25   BUN 21* 19  --  20   CREATININE 1.4* 1.0  --  1.0   CALCIUM 10.1 8.7  --  8.6     Recent Labs     01/19/21  0904   AST 33   ALT 11   BILITOT 0.7   ALKPHOS 110     Recent Labs     01/19/21  0904   INR 1.14     Recent Labs     01/19/21  0904 01/19/21  1224 01/19/21  1702   TROPONINI 0.02* 0.02* 0.02*       Urinalysis:      Lab Results   Component Value Date    NITRU Negative 01/19/2021    WBCUA 13 01/19/2021    BACTERIA 1+ 09/29/2018    RBCUA 0-2 01/19/2021    BLOODU MODERATE 01/19/2021    SPECGRAV 1.021 01/19/2021    GLUCOSEU 100 01/19/2021    GLUCOSEU NEGATIVE 07/10/2010       Radiology:  MRI BRAIN WO CONTRAST   Final Result   No evidence for restricted diffusion. Diffuse volume loss chronic white   matter changes. Incidental note is made of a intrasellar mass suspicious for macro adenoma   measuring 1.4 x 1.4 x 1.5 cm         XR CHEST PORTABLE   Final Result   No convincing evidence for acute cardiopulmonary pathology. Findings suggestive of COPD. CTA HEAD NECK W CONTRAST   Final Result   1. 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 This   is presumed chronic in nature. 2. Mild-to-moderate stenosis at the origin of the right vertebral artery. 3. Moderate focal stenosis of the left subclavian artery. 4. No hemodynamically significant stenosis or branch occlusion in the   cervical carotid arteries.    5. Moderate focal lovenox  Diet: DIET DYSPHAGIA PUREED;  Code Status: Full Code      Dispo - continue care    Ramya Glasgow MD

## 2021-01-21 NOTE — PLAN OF CARE
STROKE TEAM PLAN OF CARE    Name:  Stephanie Meza (75 y.o., female)  : 1940  MRN:  [id number]  Today's Date: 2021    Stroke team contacted at: [phone]  History obtained from: Dr. Iesha Winters and Nurse Baldomero Obey is a 79 y.o. woman currently inpatient after presenting with aphasia and left-sided weakness, who re-developed aphasia at 1130am.  She was initially admitted on  and her initial presenting symptoms resolved spontaneously. This morning, she was with her nurse Monica Franco and he witnessed her to have an acute onset of aphasia with garbled speech and confusion. Prior to her symptom onset, they were having a conversation and she was completely normal in her speech. She went for CT scan, which was unchanged from prior studies and did not demonstrate any acute intracranial process. She did not undergo CTA because the team was unable to get IV access. During this time, she started to improve on her own, and by the time she got back up to her room, she was talking but still a little confused. She was also moving all extremities with no apparent focal weakness. CTA from 2 days ago demonstrated multifocal areas of stenosis that include her bilateral vertebral arteries and basilar artery. She had an MRI yesterday which was negative for stroke. She has a history of lower GI bleed (Dec 2020) thought to be 2/2 diverticulosis. She is not on any antiplatelet agents presumably due to her GI bleed. CT (my read): No acute intracranial process, mass effect, edema or hemorrhage. She has diffuse atrophy with ex vacuo dilatation of her lateral ventricles. She has multiple bilateral remote lacunes and homogenous periventricular hypodensities overall consistent with chronic microvascular disaese. CTA (): Official read  1.  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  This   is presumed chronic in nature. 2. Mild-to-moderate stenosis at the origin of the right vertebral artery. 3. Moderate focal stenosis of the left subclavian artery. 4. No hemodynamically significant stenosis or branch occlusion in the   cervical carotid arteries. 5. Moderate focal stenosis of M2 branch of left middle cerebral artery.  Mild   stenosis within left M1 segment. 6. 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 Acute Ischemic Stroke Clinical Decision Making:    By the time she returned from her CT scan, Ms. Arash Harman was talking and following commands. She remained with some mild confusion but was overall significantly improved from her pre-CT scan exam.  She was moving all extremities without any focal deficits in strength. She did not have sensory loss or obvious cranial nerve abnormalities. (1) Intravenous tPA:  The patient is not a candidate for iv TPA based on:  Rapidly improving deficits, now with mild aphasia as her only symptom. She also has a history of GI bleed (Dec 2020) thought to be 2/2 diverticulosis. Per chart review, her hemoglobin never dropped and she did not require a transfusion. Per H&P from that admission, she was not on antiplatelet agents, even though she has a history of stroke prior to this hospitalization. It is unclear why she is not on an antiplatelet agent. (2) Angiography / Thrombectomy:  She had a CTA done 1/19 but not today due to difficulty with iv placement. Given her symptoms rapidly and almost completely resolved, we deferred CTA. If symptoms re-emerge she needs stat vessel imaging. Additional Recommendations:  -- Neurology is already onboard for Ms. Brigette Stone. Please reach out to them for additional recommendations and workup. -- Please consider aggressive management of her multifocal stenosis with high intensity statin, antiplatelet as per below. Smoking cessation counseling if she is a smoker.   -- Please consider an antiplatelet agent. Ideally, given the severity of her intracranial/extracranial stenosis, she should be on DAPT for at least 3 months per SAMMPRIS trial.  She has such severe stenosis in some areas, she may warrant longer term DAPT. Given her hx of GI bleed, this may not be possible. Defer to neurology teams for decision. -- Permissive HTN for now to goal SBP <220/110 until you speak with neurology teams. Defer additional BP management to them. Given the severity of her intracranial stenosis, a more liberal BP goal may be indicated for her.     Thomas Sanabria MD, PhD  Neurology   Stroke Team   1/21/2021

## 2021-01-21 NOTE — PROGRESS NOTES
[facility]   Speech Therapy  Daily Dysphagia Treatment Note        Leigha Jewels  AGE: 79 y.o. GENDER: female  : 1940  [id number]  EPISODE DATE:  2021  Patient Active Problem List   Diagnosis    Age-related osteoporosis with current pathol fracture of vertebra ([address]    Acute right-sided low back pain with sciatica    History of CVA with residual deficit    Tobacco abuse    Multiple closed fractures of ribs    Essential hypertension, benign    Generalized weakness    Hyperlipidemia    Fall against object    Hypokalemia    Severe protein-calorie malnutrition (Francisco Javier Risen: less than 60% of standard weight) (HCC)    Hypertensive urgency    Lower GI bleed    Sepsis (HCC)     Allergies   Allergen Reactions    Pcn [Penicillins] Hives and Itching     itchy     Treatment Diagnosis: Dysphagia       Chart review:   · MD History and Physical Documentation revealed:   Miesha Harrington y.o. female with PMH CVA, HTN, GERD, GIB 2/2 PUD, tobacco abuse who presented to [facility] due to decrease responsiveness. History taken per chart review, RN, and son at bedside.   Surinder Colon has history CVA w/ residual left arm and leg weakness. Last seen well at 8 pm last night, this morning her son found her with worsening left sided weakness, confusion, slurred speech and decreased responsiveness.  EMS found patient with serum glucose too low to read.  She received 15 g oral glucose with improvement in her blood sugar level.  Patient started improving after she was given gucose, she is more alert and speaking without signifcant slurr but still is confused.  Noted to have fever of 102 and started on abx in ED for sepsis due to UTI. Patient unable to given history, is following some commands     ·  2021 CT Head  Impression   No acute intracranial abnormality.    Mild prominence of the pituitary gland is not significantly changed dating   back to 2018. Phyllistine Codi adenoma would be difficult to exclude.  If   clinically indicated this could be further evaluated with MRI. Critical results were called by Dr. Olive Siddiqui to [facility] on   1/19/2021 at 08:32.      · 1/19/2021 CTA Head  Impression   1. 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  This   is presumed chronic in nature. 2. Mild-to-moderate stenosis at the origin of the right vertebral artery. 3. Moderate focal stenosis of the left subclavian artery. 4. No hemodynamically significant stenosis or branch occlusion in the   cervical carotid arteries. 5. Moderate focal stenosis of M2 branch of left middle cerebral artery.  Mild   stenosis within left M1 segment. 6. 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      · 1/19/2021 Chest XR      Impression   No convincing evidence for acute cardiopulmonary pathology. Findings suggestive of COPD.      · 1/21 Code Stroke late AM: acute episode of aphasia, left arm weakness, and not following commands  Preliminary CT Head 1/21:   No definite evidence of acute intracranial abnormality on a study limited as   described above.        Subjective:     Current diet  Puree  Thin liquids    Comments regarding tolerating Current Diet:   Pt requesting banana and fruit  Nurse reports pt conversant and following dx again    Objective:     Pain   Patient Currently in Pain: Denies    Cognitive/Behavior   Behavior/Cognition: Alert, Confused, Distractible, Requires cueing(Distractible with variable non-compliance with certain activities)    Presentations   Consistencies Administered:  Dysphagia Minced and Moist (Dysphagia II), Dysphagia Pureed (Dysphagia I),Thin - straw    Positioning    upright in bed; assist to stay midline; tends to roll to L side    PO Trials:  · Thin Liquids straw x6: decreased bolus control, decreased AP transit, delayed swallow initiation, decreased laryngeal elevation. No immediate overt s/s of aspiration. One episode of very delayed deep cough, potentially consistent with reflux in nature? · Puree x2 (pt declined additional): decreased bolus control, prolonged lingual manipulation, decreased AP transit, delayed swallow initiation, decreased laryngeal elevation. No immediate overt s/s of aspiration. · Minced food x1 (yuli bit with pudding):  Prolonged ineffective mastication, decreased bolus control, prolonged lingual manipulation, decreased AP transit, delayed swallow initiation, decreased laryngeal elevation. No immediate overt s/s of aspiration. Eventual expectoration of textured portion of bolus    Dysphagia Tx:   · Pt alert, verbose, makes basic wants/needs known. Follows simple commands for OM assessment; facial symmetry and lingual protrusion to midline; good lingual ROM. OM assessment limited 2/2 pt cooperation with exam.  · Easily distracted, requires frequent redirection to task, to maintain optimal upright midline positioning, and for participation with PO trials  · PO as described above. No immediate overt s/s with puree and thin liquids via straw; prolonged mastication with expectoration of minced/moist texture. Delayed deep cough with thin x1, consistent with potential reflux in nature? Goals: The patient will tolerate recommended diet without observed clinical signs of aspiration ongoing; puree/thin appears most optimal at this time  The patient/caregiver will demonstrate understanding of compensatory strategies for improved swallowing safety. Ongoing; pt is cue dependent, inconsistently follows dx, appears potentially behavioral v cognitive in nature?    Pt will tolerate therapy trials of dysphagia minced and moist food consistencies 10/10 without overt clinical s/s of aspiration and without oral pocketing ongoing; pt spit out trial after prolonged mastication    Assessment:   Impressions:   Swallow function reassessed after Code Stroke today. Swallow function appears comparable to 1/20 Clinical Swallow Evaluation. Oral > pharyngeal Dysphagia characterized by prolonged, at times incomplete, mastication; variable reduced rate of A-P oral transit and delayed initiation of swallow with potential reduced laryngeal elevation. · No immediate overt s/s of aspiration. Delayed deep cough x1, potentially 2/2 reflux? · Recommend continuing puree diet with thin liquids. · ST will follow for tolerance, readiness for diet upgrade, and potential need for formal SLP eval.  Pt verbose, distractible, and agitated at times this session. ·  Pt with documented history of GERD. GERD precautions recommended    Diet Recommendations:  Puree diet texture  Thin liquids  Recommended Form of Meds: Crushed in puree as able    Strategies:   Compensatory Swallowing Strategies: Upright as possible for all oral intake, Assist feed, Total feed, Swallow 2 times per bite/sip, Remain upright for 30-45 minutes after meals(oral care)    Education:  Consulted and agree with results and recommendations: Patient, RN     Patient Education Response: No evidence of learning    Prognosis:   fair    Plan:     Continue Dysphagia Therapy: yes  Interventions: Therapeutic Interventions: Diet tolerance monitoring, Patient/Family education, Therapeutic PO trials with SLP(with diet upgrade when/if warranted)  Duration/Frequency of therapy while on unit: Duration/Frequency of Treatment  Duration/Frequency of Treatment: 3-5 times a week while on acute medical floor;additional recommendations to be determined at d/c  Discharge Instructions:   Anticipate potential for further skilled Speech Therapy for Dysphagia at discharge    This note serves as a D/C Summary in the event that this patient is discharged prior to the next therapy session.     Coded treatment time:10  Total treatment time: 30    Electronically signed by  Keshawn West MS, CCC-SLP #[id number]  Speech Language Pathologist    on 1/21/2021 at 304 PM

## 2021-01-21 NOTE — PROGRESS NOTES
Speech Language Pathology    125PM attempt: Noted Code Stroke in chart. Nurse reports pt is currently agitated, and requests ST check back later this afternoon.     Chavez Reyna MS, CCC-SLP #[id number]  Speech Language Pathologist

## 2021-01-21 NOTE — PROGRESS NOTES
Occupational Therapy  Chart reviewed and spoke with RN. RN starting IV and requests therapy attempt later. Due to level of assist required (x2 with walker), will attempt later if IV not attached. Refer to the last note for status if pt discharged. Follow up attempt. Spoke with nurse and chart reviewed. Pt had code stroke called earlier. After cleared by RN to see pt, pt declined therapy, \"I don't want no therapy. .\". will follow and attempt tx tomorrow.   Augustine ZUÑIGA/ANGELO,[id number]

## 2021-01-21 NOTE — CONSULTS
 GERD (gastroesophageal reflux disease)     Hypertension     Stroke ([facility] 2002    left weakness     Tobacco abuse      Past Surgical History:   Procedure Laterality Date    CHOLECYSTECTOMY  2014    COLONOSCOPY  2011    COLONOSCOPY N/A 12/31/2019    COLONOSCOPY DIAGNOSTIC performed by Jaswant Casas.DO at [address], TOTAL ABDOMINAL  1970's    JACQUELYN/BSO for 'tumor'     Scheduled Meds:   cefTRIAXone (ROCEPHIN) IV  1,000 mg Intravenous Q24H    sodium chloride flush  10 mL Intravenous 2 times per day    enoxaparin  30 mg Subcutaneous Daily    NIFEdipine  30 mg Oral Daily    pantoprazole  40 mg Oral Daily    atorvastatin  20 mg Oral Nightly     Medications Prior to Admission:   docusate sodium (COLACE) 100 MG capsule, Take 100 mg by mouth daily  calcium-vitamin D (OSCAL-500) 500-200 MG-UNIT per tablet, Take 1 tablet by mouth 2 times daily  lisinopril (PRINIVIL;ZESTRIL) 20 MG tablet, Take 20 mg by mouth daily  traZODone (DESYREL) 50 MG tablet, Take 50 mg by mouth nightly as needed for Sleep  pantoprazole (PROTONIX) 40 MG tablet, Take 40 mg by mouth daily  oxybutynin (DITROPAN) 5 MG tablet, Take 5 mg by mouth 2 times daily as needed (bladder spasms)  simvastatin (ZOCOR) 40 MG tablet, Take 40 mg by mouth nightly  traMADol (ULTRAM) 50 MG tablet, Take 50 mg by mouth every 6 hours as needed.   NIFEdipine (ADALAT CC) 30 MG extended release tablet, Take 1 tablet by mouth daily  hydrochlorothiazide (HYDRODIURIL) 25 MG tablet, Take 1 tablet by mouth daily    Allergies   Allergen Reactions    Pcn [Penicillins] Hives and Itching     itchy     Family History   Problem Relation Age of Onset    High Blood Pressure Mother     High Blood Pressure Father     High Blood Pressure Daughter     Cancer Son      Social History     Tobacco Use   Smoking Status Current Every Day Smoker    Packs/day: 0.25    Years: 50.00    Pack years: 12.50    Types: Cigarettes Smokeless Tobacco Never Used     Social History     Substance and Sexual Activity   Drug Use No     Social History     Substance and Sexual Activity   Alcohol Use No     ROS: unable to assess. Patient is not cooperative. Exam: limited. Patient would not allow me to perform a full neurologic exam.    Blood pressure (!) 169/77, pulse 96, temperature 98.7 °F (37.1 °C), resp. rate 16, height 5' 3\" (1.6 m), weight 97 lb 14.2 oz (44.4 kg), SpO2 94 %, not currently breastfeeding. Constitutional    Vital signs: BP, HR, and RR reviewed   General alert, no distress. Says \"leave me alone\". RN present. Eyes: unable to visualize the fundi  Cardiovascular: pulses symmetric in all 4 extremities. Psychiatric: uncooperative with examination  Neurologic  Mental status:   orientation to person. Otherwise declines to answer. General fund of knowledge lucas due to cooperation. Memory lucas due to cooperation. Attention aware of me being present at the bedside. Language no aphasia. Comprehension lucas due to cooperation. Cranial nerves:   CN2: lucas visual fields due to cooperation. CN 3,4,6: lucas due to cooperation. CN5: facial sensation lucas due to cooperation. CN7: face grossly symmetric without dysarthria  CN8: hearing grossly intact  CN9: palate elevation lucas. CN11: trap strength lucas. CN12: tongue protrusion lucas. Strength: very difficult to assess given patient cooperation. Deep tendon reflexes: lucas due to cooperation. Sensory: light touch intact in all 4 extremities. Cerebellar/coordination: finger nose finger lucas due to cooperation. Tone: lucas due to cooperation. Gait: deferred at this time for safety. Labs  Glucose 108  Na 141  K 3.1  BUN 20  Cr 1.0  ALT 11  AST 33    WBC 8.4K  Hg 13.4  Platelets 992    Lactic acid 5.9 -> 1.8    COVID negative  Blood culture NG    UA moderate LE, 13 WBCs.   Culture >100,000 CFU/ml aerococcus species     Studies  CT head w/o 1/21/21, independently reviewed  No definite evidence of acute intracranial abnormality on a study limited as   described above. MRI brain w/o 1/20/21, independently reviewed  1. No evidence for restricted diffusion.  Diffuse volume loss chronic white   matter changes. 2. Incidental note is made of a intrasellar mass suspicious for macro adenoma   measuring 1.4 x 1.4 x 1.5 cm     CTA head/neck 1/19/21, independently reviewed  1. 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  This   is presumed chronic in nature. 2. Mild-to-moderate stenosis at the origin of the right vertebral artery. 3. Moderate focal stenosis of the left subclavian artery. 4. No hemodynamically significant stenosis or branch occlusion in the   cervical carotid arteries. 5. Moderate focal stenosis of M2 branch of left middle cerebral artery.  Mild   stenosis within left M1 segment. 6. 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 EKG 1/19/21  ST w/ PVCs    Impression  1. Encephalopathy w/ reported worsening of chronic neurologic deficits, likely secondary to hypoglycemia/UTI. No acute stroke on MRI brain done yesterday. She had another transient episode of altered awareness/language dysfunction this afternoon for which she appears to have recovered to previous baseline. A neurovascular event is possible. 2.  Hypoglycemia. Resolved. 3.  Urinary tract infection. 4.  Multifocal neurovascular disease. 5.  Tobacco abuse, smoker. 6.  Hx stroke. 7. Hx GI bleed, recent admission. Recommendations  1. Would recommend initiating antiplatelet therapy if no absolute contraindications. She is high risk for future TIA/stroke. 2.  Statin. Will check lipid panel. 3.  HgA1c.    4.  Will check TTE. 5.  BP control. Avoid any significant hypotension. 6.  Telemetry. 7.  Smoking cessation.     8. Rehabilitation efforts. 9.  Management of other acute medical issues underway.       Marium Murry NP  [address] [place] [address] [address] Neurology    A copy of this note was provided for Dr Patricia Mcintyre MD

## 2021-01-21 NOTE — PROGRESS NOTES
Physical Therapy  Parrish Graham  [id number]  [id number]    Pt had a code stoke called this afternoon; pt in room now and nursing reports she has said she doesn't want to be bothered; pt confirmed that she does not want any therapy today; will continue to follow and attempt again tomorrow  Daniel Pereira, [address] [facility], [address]

## 2021-01-22 LAB
ANION GAP SERPL CALCULATED.3IONS-SCNC: 15 MMOL/L (ref 3–16)
BUN BLDV-MCNC: 16 MG/DL (ref 7–20)
CALCIUM SERPL-MCNC: 8.9 MG/DL (ref 8.3–10.6)
CHLORIDE BLD-SCNC: 100 MMOL/L (ref 99–110)
CHOLESTEROL, TOTAL: 154 MG/DL (ref 0–199)
CO2: 24 MMOL/L (ref 21–32)
CREAT SERPL-MCNC: 0.9 MG/DL (ref 0.6–1.2)
EKG ATRIAL RATE: 82 BPM
EKG DIAGNOSIS: NORMAL
EKG P AXIS: -2 DEGREES
EKG P-R INTERVAL: 120 MS
EKG Q-T INTERVAL: 384 MS
EKG QRS DURATION: 92 MS
EKG QTC CALCULATION (BAZETT): 448 MS
EKG R AXIS: -23 DEGREES
EKG T AXIS: 18 DEGREES
EKG VENTRICULAR RATE: 82 BPM
ESTIMATED AVERAGE GLUCOSE: 105.4 MG/DL
GFR AFRICAN AMERICAN: >60
GFR NON-AFRICAN AMERICAN: >60
GLUCOSE BLD-MCNC: 113 MG/DL (ref 70–99)
GLUCOSE BLD-MCNC: 150 MG/DL (ref 70–99)
GLUCOSE BLD-MCNC: 98 MG/DL (ref 70–99)
HBA1C MFR BLD: 5.3 %
HDLC SERPL-MCNC: 52 MG/DL (ref 40–60)
LACTIC ACID: 0.9 MMOL/L (ref 0.4–2)
LDL CHOLESTEROL CALCULATED: 87 MG/DL
LV EF: 70 %
LVEF MODALITY: NORMAL
PERFORMED ON: ABNORMAL
PERFORMED ON: ABNORMAL
POTASSIUM SERPL-SCNC: 3.1 MMOL/L (ref 3.5–5.1)
SODIUM BLD-SCNC: 139 MMOL/L (ref 136–145)
TRIGL SERPL-MCNC: 73 MG/DL (ref 0–150)
VLDLC SERPL CALC-MCNC: 15 MG/DL

## 2021-01-22 PROCEDURE — 97530 THERAPEUTIC ACTIVITIES: CPT | Performed by: PHYSICAL THERAPIST

## 2021-01-22 PROCEDURE — 94760 N-INVAS EAR/PLS OXIMETRY 1: CPT

## 2021-01-22 PROCEDURE — 93010 ELECTROCARDIOGRAM REPORT: CPT | Performed by: INTERNAL MEDICINE

## 2021-01-22 PROCEDURE — 2580000003 HC RX 258: Performed by: FAMILY MEDICINE

## 2021-01-22 PROCEDURE — 6370000000 HC RX 637 (ALT 250 FOR IP): Performed by: FAMILY MEDICINE

## 2021-01-22 PROCEDURE — 97129 THER IVNTJ 1ST 15 MIN: CPT

## 2021-01-22 PROCEDURE — 92526 ORAL FUNCTION THERAPY: CPT

## 2021-01-22 PROCEDURE — 80048 BASIC METABOLIC PNL TOTAL CA: CPT

## 2021-01-22 PROCEDURE — 93306 TTE W/DOPPLER COMPLETE: CPT

## 2021-01-22 PROCEDURE — 80061 LIPID PANEL: CPT

## 2021-01-22 PROCEDURE — 2060000000 HC ICU INTERMEDIATE R&B

## 2021-01-22 PROCEDURE — 36415 COLL VENOUS BLD VENIPUNCTURE: CPT

## 2021-01-22 PROCEDURE — 6360000002 HC RX W HCPCS: Performed by: FAMILY MEDICINE

## 2021-01-22 PROCEDURE — 93005 ELECTROCARDIOGRAM TRACING: CPT | Performed by: FAMILY MEDICINE

## 2021-01-22 PROCEDURE — 83605 ASSAY OF LACTIC ACID: CPT

## 2021-01-22 RX ADMIN — ACETAMINOPHEN 650 MG: 325 TABLET ORAL at 04:42

## 2021-01-22 RX ADMIN — POTASSIUM BICARBONATE 40 MEQ: 782 TABLET, EFFERVESCENT ORAL at 18:47

## 2021-01-22 RX ADMIN — SODIUM CHLORIDE, PRESERVATIVE FREE 10 ML: 5 INJECTION INTRAVENOUS at 20:37

## 2021-01-22 RX ADMIN — HYDRALAZINE HYDROCHLORIDE 25 MG: 25 TABLET, FILM COATED ORAL at 20:38

## 2021-01-22 RX ADMIN — PANTOPRAZOLE SODIUM 40 MG: 40 TABLET, DELAYED RELEASE ORAL at 08:57

## 2021-01-22 RX ADMIN — CEFTRIAXONE 1000 MG: 1 INJECTION, POWDER, FOR SOLUTION INTRAMUSCULAR; INTRAVENOUS at 09:03

## 2021-01-22 RX ADMIN — ATORVASTATIN CALCIUM 20 MG: 20 TABLET, FILM COATED ORAL at 20:37

## 2021-01-22 RX ADMIN — NIFEDIPINE 30 MG: 30 TABLET, EXTENDED RELEASE ORAL at 08:57

## 2021-01-22 RX ADMIN — ASPIRIN 81 MG: 81 TABLET, CHEWABLE ORAL at 08:57

## 2021-01-22 RX ADMIN — ENOXAPARIN SODIUM 30 MG: 30 INJECTION SUBCUTANEOUS at 08:56

## 2021-01-22 RX ADMIN — SODIUM CHLORIDE, PRESERVATIVE FREE 10 ML: 5 INJECTION INTRAVENOUS at 09:04

## 2021-01-22 RX ADMIN — HYDRALAZINE HYDROCHLORIDE 25 MG: 25 TABLET, FILM COATED ORAL at 04:42

## 2021-01-22 ASSESSMENT — PAIN DESCRIPTION - DESCRIPTORS
DESCRIPTORS: CONSTANT
DESCRIPTORS: OTHER (COMMENT)

## 2021-01-22 ASSESSMENT — PAIN SCALES - GENERAL
PAINLEVEL_OUTOF10: 8
PAINLEVEL_OUTOF10: 0
PAINLEVEL_OUTOF10: 5
PAINLEVEL_OUTOF10: 0
PAINLEVEL_OUTOF10: 0
PAINLEVEL_OUTOF10: 8
PAINLEVEL_OUTOF10: 0
PAINLEVEL_OUTOF10: 6

## 2021-01-22 ASSESSMENT — PAIN DESCRIPTION - PAIN TYPE
TYPE: ACUTE PAIN
TYPE: ACUTE PAIN

## 2021-01-22 ASSESSMENT — PAIN DESCRIPTION - LOCATION: LOCATION: HEAD

## 2021-01-22 ASSESSMENT — PAIN DESCRIPTION - ORIENTATION
ORIENTATION: ANTERIOR
ORIENTATION: ANTERIOR

## 2021-01-22 ASSESSMENT — PAIN DESCRIPTION - ONSET
ONSET: GRADUAL
ONSET: GRADUAL

## 2021-01-22 ASSESSMENT — PAIN SCALES - WONG BAKER: WONGBAKER_NUMERICALRESPONSE: 0

## 2021-01-22 NOTE — PROGRESS NOTES
Progress Note    Updates  No significant events overnight. Patient wants to go home.       Active Ambulatory Problems     Diagnosis Date Noted    Age-related osteoporosis with current pathol fracture of vertebra ([facility] 05/04/2017    Acute right-sided low back pain with sciatica 08/09/2017    History of CVA with residual deficit 10/17/2017    Tobacco abuse 10/17/2017    Multiple closed fractures of ribs 11/01/2018    Essential hypertension, benign 11/01/2018    Generalized weakness 11/01/2018    Hyperlipidemia 11/01/2018    Fall against object 11/02/2018    Hypokalemia 11/02/2018    Severe protein-calorie malnutrition Gregorio Sermon: less than 60% of standard weight) ([facility] 11/03/2018    Hypertensive urgency 04/23/2019    Lower GI bleed 12/30/2019     Past Medical History:   Diagnosis Date    CARROLL positive     Arthritis     Bleeding stomach ulcer     Diverticulitis     GERD (gastroesophageal reflux disease)     Hypertension     Stroke ([facility] [address] 2002     Past Surgical History:   Procedure Laterality Date    CHOLECYSTECTOMY  2014    COLONOSCOPY  2011    COLONOSCOPY N/A 12/31/2019    COLONOSCOPY DIAGNOSTIC performed by Nadya Biswas DO at [facility], TOTAL ABDOMINAL  1970's    JACQUELYN/BSO for 'tumor'     Current Facility-Administered Medications:     cefTRIAXone (ROCEPHIN) 1000 mg IVPB in 50 mL D5W minibag, 1,000 mg, Intravenous, Q24H, Radha Quarles MD, Stopped at 01/21/21 1329    traMADol (ULTRAM) tablet 50 mg, 50 mg, Oral, Q6H PRN, Radha Quarles MD, 50 mg at 01/21/21 1059    iopamidol (ISOVUE-370) 76 % injection 75 mL, 75 mL, Intravenous, ONCE PRN, Radha Quarles MD    hydrALAZINE (APRESOLINE) tablet 25 mg, 25 mg, Oral, 3 times per day, Radha Quarles MD, 25 mg at 01/22/21 8287    aspirin chewable tablet 81 mg, 81 mg, Oral, Daily, Radha Quarles MD    melatonin tablet 3 mg, 3 mg, Oral, Nightly PRN, Estrella Dumont APRN - CNP, 3 mg at 01/20/21 2215    sodium chloride flush 0.9 % injection 10 mL, 10 mL, Intravenous, 2 times per day, Zoila Hendricks MD, 10 mL at 01/21/21 2058    sodium chloride flush 0.9 % injection 10 mL, 10 mL, Intravenous, PRN, Zoila Hendricks MD, 10 mL at 01/19/21 1110    acetaminophen (TYLENOL) tablet 650 mg, 650 mg, Oral, Q6H PRN, 650 mg at 01/22/21 0442 **OR** acetaminophen (TYLENOL) suppository 650 mg, 650 mg, Rectal, Q6H PRN, Zoila Hendricks MD    glucose (GLUTOSE) 40 % oral gel 15 g, 15 g, Oral, PRN, Zoila Hendricks MD    dextrose 50 % IV solution, 12.5 g, Intravenous, PRN, Zoila Hendricks MD    glucagon (rDNA) injection 1 mg, 1 mg, Intramuscular, PRN, Zoila Hendricks MD    dextrose 5 % solution, 100 mL/hr, Intravenous, PRN, Zoila Hendricks MD    enoxaparin (LOVENOX) injection 30 mg, 30 mg, Subcutaneous, Daily, Zoila Hendricks MD, 30 mg at 01/21/21 0843    NIFEdipine (PROCARDIA XL) extended release tablet 30 mg, 30 mg, Oral, Daily, Zoila Hendricks MD, 30 mg at 01/21/21 0843    pantoprazole (PROTONIX) tablet 40 mg, 40 mg, Oral, Daily, Zoila Hendricks MD, 40 mg at 01/21/21 0843    atorvastatin (LIPITOR) tablet 20 mg, 20 mg, Oral, Nightly, Zoila Hendricks MD, 20 mg at 01/21/21 2058    dextrose 5 % and 0.45 % sodium chloride infusion, , Intravenous, Continuous, Zoila Hendricks MD, Stopped at 01/20/21 1547    labetalol (NORMODYNE;TRANDATE) injection 10 mg, 10 mg, Intravenous, Q6H PRN, Zoila Hendricks MD    Exam  Blood pressure (!) 165/83, pulse 89, temperature 98.5 °F (36.9 °C), temperature source Oral, resp. rate 16, height 5' 3\" (1.6 m), weight 109 lb 2 oz (49.5 kg), SpO2 91 %, not currently breastfeeding. Constitutional    Vital signs: BP, HR, and RR reviewed   General alert, no distress  Eyes: unable to visualize the fundi  Cardiovascular: pulses symmetric in all 4 extremities. Psychiatric: cooperative with examination, no psychotic behavior noted.   Neurologic  Mental status: orientation to person, place   Attention intact as able to attend well to the exam     Language fluent in conversation   Comprehension intact; follows simple commands  Cranial nerves:   CN2: visual fields full   CN 3,4,6: extraocular muscles intact  CN7: face symmetric without dysarthria  CN8: hearing grossly intact  CN11: trap strength decreased on the L.    CN12: tongue midline with protrusion  Strength: chronic LUE/LLE weakness from previous stroke  Sensory: light touch intact in all 4 extremities. Cerebellar/coordination: finger nose finger difficult to accurately assess at this time. Tone: normal in all 4 extremities  Gait: deferred at this time for safety. ROS  Constitutional- No weight loss or fevers  Eyes- No diplopia. No photophobia. Ears/nose/throat- No dysphagia. No Dysarthria  Cardiovascular- No palpitations. No chest pain  Respiratory- No dyspnea. No Cough  Gastrointestinal- No Abdominal pain. No Vomiting. Genitourinary- No incontinence. No urinary retention  Musculoskeletal- No myalgia. No arthralgia  Skin- No rash. No easy bruising. Psychiatric- No depression. No anxiety  Endocrine- No diabetes. No thyroid issues. Hematologic- No bleeding difficulty. No fatigue  Neurologic- chronic weakness. No Headache. Labs  HgA1c 5.3    Cholesterol, Total 154   HDL Cholesterol 52   LDL Calculated 87   Triglycerides 73   VLDL Cholesterol Calculated 15     COVID negative  Blood culture NG  UA moderate LE, 13 WBCs. Culture >100,000 CFU/ml aerococcus species      Studies  CT head w/o 1/21/21  No definite evidence of acute intracranial abnormality on a study limited as   described above.      MRI brain w/o 1/20/21  1. No evidence for restricted diffusion.  Diffuse volume loss chronic white   matter changes. 2. Incidental note is made of a intrasellar mass suspicious for macro adenoma   measuring 1.4 x 1.4 x 1.5 cm      CTA head/neck 1/19/21  1.  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  This   is presumed chronic in nature. 2. Mild-to-moderate stenosis at the origin of the right vertebral artery. 3. Moderate focal stenosis of the left subclavian artery. 4. No hemodynamically significant stenosis or branch occlusion in the   cervical carotid arteries. 5. Moderate focal stenosis of M2 branch of left middle cerebral artery.  Mild   stenosis within left M1 segment. 6. 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      EKG 1/19/21  ST w/ PVCs    Impression  1. Encephalopathy w/ reported worsening of chronic L sided weakness r/t stroke, likely secondary to hypoglycemia/UTI. She seems to be back to baseline. No acute stroke on MRI brain done 2 days ago. She did have another transient episode of altered awareness/language dysfunction yesterday which could plausibly have been a TIA. 2.  Multifocal neurovascular disease. 3.  Tobacco abuse, smoker. 4. Hx GI bleed, recent admission. Appears stable. Recommendations  1. Low dose asa has been initiated. Monitor for s/s bleeding. 2.  High intensity statin. LDL < 70.    3.  TTE pending. 4.  BP control. 5.  Smoking cessation. 6.  Rehabilitation efforts. If ECHO is normal, will sign off. Please call back w/ any additional questions or concerns. Thank you.       Olvin Dickson NP  [address] [place] [address] [address] Neurology    A copy of this note was provided for Dr Sandro Moss MD

## 2021-01-22 NOTE — PROGRESS NOTES
Physical Therapy  Marleni Tidwell  [id number]  [id number]    Attempted to see for PT session however pt kept saying \"I can get up, take my word for it\". Educated pt on importance of getting up OOB but she continued to refuse. Pt requesting OJ and an apple; explained that OJ would be coming up on her breakfast tray but she wasn't able to have an apple as she is on a pureed diet. Offered apple sauce but she refused. Recommended she speak with her doctor about her diet, but she said \"Tell him to eat it\". Will continue to follow and attempt OOB when pt more willing.     [address] [facility], [address] [facility], [address]

## 2021-01-22 NOTE — PROGRESS NOTES
[facility]   Speech Therapy  Daily Dysphagia Treatment Note    Chay Vinson  AGE: 79 y.o. GENDER: female  : 1940  [id number]  EPISODE DATE:  2021     Patient Active Problem List   Diagnosis    Age-related osteoporosis with current pathol fracture of vertebra ([address]    Acute right-sided low back pain with sciatica    History of CVA with residual deficit    Tobacco abuse    Multiple closed fractures of ribs    Essential hypertension, benign    Generalized weakness    Hyperlipidemia    Fall against object    Hypokalemia    Severe protein-calorie malnutrition (Ghanshyam Peek: less than 60% of standard weight) (HCC)    Hypertensive urgency    Lower GI bleed    Sepsis (HCC)     Allergies   Allergen Reactions    Pcn [Penicillins] Hives and Itching     itchy     Treatment Diagnosis: Dysphagia     Chart review:   · MD History and Physical Documentation revealed: Loise [age] y.o. female with PMH CVA, HTN, GERD, GIB 2/2 PUD, tobacco abuse who presented to [facility] due to decrease responsiveness. History taken per chart review, RN, and son at bedside. Patient has [address] w/ residual left arm and leg weakness. Last seen well at 8 pm last night, this morning her son found her with worsening left sided weakness, confusion, slurred speech and decreased responsiveness.  EMS found patient with serum glucose too low to read.  She received 15 g oral glucose with improvement in her blood sugar level.  Patient started improving after she was given gucose, she is more alert and speaking without signifcant slurr but still is confused.  Noted to have fever of 102 and started on abx in ED for sepsis due to UTI. Patient unable to given history, is following some commands     ·  2021 CT Head  Impression   No acute intracranial abnormality.    Mild prominence of the pituitary gland is not significantly changed dating   back to 2018. Roseanne Velez adenoma would be difficult to exclude.  If   clinically indicated this could be further evaluated with MRI. Critical results were called by Dr. Brett Roldan to [facility] on   1/19/2021 at 08:32.      · 1/19/2021 CTA Head  Impression   1. 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  This   is presumed chronic in nature. 2. Mild-to-moderate stenosis at the origin of the right vertebral artery. 3. Moderate focal stenosis of the left subclavian artery. 4. No hemodynamically significant stenosis or branch occlusion in the   cervical carotid arteries. 5. Moderate focal stenosis of M2 branch of left middle cerebral artery.  Mild   stenosis within left M1 segment. 6. 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      · 1/19/2021 Chest XR      Impression   No convincing evidence for acute cardiopulmonary pathology. Findings suggestive of COPD.      · 1/21 Code Stroke late AM: acute episode of aphasia, left arm weakness, and not following commands  Preliminary CT Head 1/21:   No definite evidence of acute intracranial abnormality on a study limited as   described above.      · 1/22/2021 CSE re-ordered d/t patient requesting upgrade from puree    Subjective:     Current diet  Puree  Thin liquids    Comments regarding tolerating Current Diet:   Pt requesting diet upgrade  Daughter reports baseline dysphagia status: edentulous but able to tolerate regular solids; does endorse some difficulty with nuts    Objective:     Pain   Patient Currently in Pain: Denies    Cognitive/Behavior   Behavior/Cognition: Alert, Cooperative, Confused, Distractible, Requires cueing    Presentations   Consistencies Administered:  Dysphagia Minced and Moist (Dysphagia II), Dysphagia Pureed (Dysphagia I),Thin - straw    Positioning    upright in bed; assist to stay midline; tends to roll to L side    PO Trials:  · Thin Liquids straw: monitor for potential need for formal SLP eval.    ·  Pt with documented history of GERD. GERD precautions recommended    Diet Recommendations:  Puree diet texture  Thin liquids  Recommended Form of Meds: Crushed in puree as able  Compensatory Swallowing Strategies: Upright as possible for all oral intake, Assist feed, Total feed, Swallow 2 times per bite/sip, Remain upright for 30-45 minutes after meals(oral care)    Education:  Consulted and agree with results and recommendations: Patient, RN  Patient Education Response: No evidence of learning    Prognosis:   Guarded for diet advancement    Plan:     Continue Dysphagia Therapy: yes  Interventions: Therapeutic Interventions: Diet tolerance monitoring, Patient/Family education, Therapeutic PO trials with SLP(with diet upgrade when/if warranted)  Duration/Frequency of therapy while on unit: Duration/Frequency of Treatment  Duration/Frequency of Treatment: 3-5 times a week while on acute medical floor;additional recommendations to be determined at d/c  Discharge Instructions:   Anticipate potential for further skilled Speech Therapy for Dysphagia at discharge    This note serves as a D/C Summary in the event that this patient is discharged prior to the next therapy session. Coded treatment time:10  Total treatment time: 30    Electronically signed by  Todd Hood.  Peyman Marks, [address] [place] [address], [id number]  Speech-Language Pathologist  on 1/22/2021 at 1:20 PM

## 2021-01-22 NOTE — PLAN OF CARE
Problem: Falls - Risk of:  Goal: Will remain free from falls  Outcome: Ongoing  Goal: Absence of physical injury  Outcome: Ongoing     Problem: Skin Integrity:  Goal: Will show no infection signs and symptoms  Outcome: Ongoing  Goal: Absence of new skin breakdown  Outcome: Ongoing     Problem: Infection:  Goal: Will remain free from infection  Outcome: Ongoing     Problem: Safety:  Goal: Free from accidental physical injury  Outcome: Ongoing  Goal: Free from intentional harm  Outcome: Ongoing     Problem: Daily Care:  Goal: Daily care needs are met  Outcome: Ongoing     Problem: Pain:  Goal: Patient's pain/discomfort is manageable  Outcome: Ongoing     Problem: Skin Integrity:  Goal: Skin integrity will stabilize  Outcome: Ongoing     Problem: Discharge Planning:  Goal: Patients continuum of care needs are met  Outcome: Ongoing     Problem: Confusion - Acute:  Goal: Absence of continued neurological deterioration signs and symptoms  Outcome: Ongoing  Goal: Mental status will be restored to baseline  Outcome: Ongoing     Problem: Discharge Planning:  Goal: Ability to perform activities of daily living will improve  Outcome: Ongoing  Goal: Participates in care planning  Outcome: Ongoing     Problem: Injury - Risk of, Physical Injury:  Goal: Will remain free from falls  Outcome: Ongoing  Goal: Absence of physical injury  Outcome: Ongoing     Problem: Mood - Altered:  Goal: Mood stable  Outcome: Ongoing  Goal: Absence of abusive behavior  Outcome: Ongoing  Goal: Verbalizations of feeling emotionally comfortable while being cared for will increase  Outcome: Ongoing     Problem: Psychomotor Activity - Altered:  Goal: Absence of psychomotor disturbance signs and symptoms  Outcome: Ongoing     Problem: Sensory Perception - Impaired:  Goal: Demonstrations of improved sensory functioning will increase  Outcome: Ongoing  Goal: Decrease in sensory misperception frequency  Outcome: Ongoing  Goal: Able to refrain from responding to false sensory perceptions  Outcome: Ongoing  Goal: Demonstrates accurate environmental perceptions  Outcome: Ongoing  Goal: Able to distinguish between reality-based and nonreality-based thinking  Outcome: Ongoing  Goal: Able to interrupt nonreality-based thinking  Outcome: Ongoing     Problem: Sleep Pattern Disturbance:  Goal: Appears well-rested  Outcome: Ongoing

## 2021-01-22 NOTE — PROGRESS NOTES
Hospitalist Progress Note      PCP: [id number]    Date of Admission: 1/19/2021      Hospital Course: William Davidson is 79 y.o. female with PMH CVA, HTN, GERD, GIB 2/2 PUD, tobacco abuse who presented to [facility] with worsening left sided weakness, confusion, slurred speech and decreased responsiveness. She was treated by EMS for hypoglycemia, and was admitted for sepsis 2/2 UTI. Subjective:    Patient seen and examined. Awake, speaking to me, states she wants to go home. Not working with therapy. Per RN alertness seems to wax and wane. Medications:  Reviewed    Infusion Medications    dextrose       Scheduled Medications    cefTRIAXone (ROCEPHIN) IV  1,000 mg Intravenous Q24H    hydrALAZINE  25 mg Oral 3 times per day    aspirin  81 mg Oral Daily    sodium chloride flush  10 mL Intravenous 2 times per day    enoxaparin  30 mg Subcutaneous Daily    NIFEdipine  30 mg Oral Daily    pantoprazole  40 mg Oral Daily    atorvastatin  20 mg Oral Nightly     PRN Meds: traMADol, iopamidol, melatonin, sodium chloride flush, acetaminophen **OR** acetaminophen, glucose, dextrose, glucagon (rDNA), dextrose, labetalol      Intake/Output Summary (Last 24 hours) at 1/22/2021 1656  Last data filed at 1/22/2021 0935  Gross per 24 hour   Intake 60 ml   Output --   Net 60 ml       Physical Exam Performed:    BP (!) 153/78   Pulse 93   Temp 97.6 °F (36.4 °C) (Oral)   Resp 16   Ht 5' 3\" (1.6 m)   Wt 109 lb 2 oz (49.5 kg)   SpO2 90%   BMI 19.33 kg/m²       General appearance: No apparent distress, alert   HEENT: Conjunctivae/corneas clear, neck supple w/ full ROM  Respiratory:  Normal respiratory effort. Clear to auscultation, bilaterally without Rales/Wheezes/Rhonchi. Cardiovascular: Regular rate and rhythm, normal S1/S2, no murmurs  Abdomen: Soft, non-tender, non-distended with normal bowel sounds.   Musculoskeletal: No edema bilaterally  Neurologic:  Chronic left sided weakness, aphasia, vertebral artery. 3. Moderate focal stenosis of the left subclavian artery. 4. No hemodynamically significant stenosis or branch occlusion in the   cervical carotid arteries. 5. Moderate focal stenosis of M2 branch of left middle cerebral artery. Mild   stenosis within left M1 segment. 6. 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 CT HEAD WO CONTRAST   Final Result   No acute intracranial abnormality. Mild prominence of the pituitary gland is not significantly changed dating   back to 11/1/2018. Underlying adenoma would be difficult to exclude. If   clinically indicated this could be further evaluated with MRI. Critical results were called by Dr. Naomie Andino to [address] [place] [address] on   1/19/2021 at 08:32.                  Assessment/Plan:    Active Hospital Problems    Diagnosis    Sepsis ([facility] [V29.4]   Acute metabolic encephalopathy  - due to hypoglycemia and Sepsis  - treat accordingly and monitor   - CT head negative for acute abnormality  - Speech thearpy - dysphagia diet  - MRI brain - no acute CVA  - neuro checks  - pt/ot    Stroke alert 1/21- episode of alertered awareness, aphasia, weakness  - CT head - no acute changes  - concern for possible TIAs  - neurology c/s - recommends resuming antiplatelet, will start asa 81 mg and monitor closely given hx GIB    UTI  - urine culture - aerococcus species, pending  - c/w rocephin     Sepsis POA - improving  - afebrile today, leukocytosis resolved  - bl cx ngtd  - abx as above     Lactic acidosis   - resolved     DANIEL   - improved,  - hold ace-I, hctz  - monitor    Hypokalemia  - replace, monitor     Hypoglycemia - resolved  - d/c IVF  - protocol prn, monitor poc glucose levels    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

## 2021-01-22 NOTE — PROGRESS NOTES
Occupational Therapy  Unable to see pt at this time due to pt declining any mobility this date. Pt educated on importance of mobility with goal of returning home with pt continuing to decline. Pt fixated on getting food and normal diet. Pt with poor insight into deficits. Will follow up with pt as time allows. Continue with POC.     Monalisa Parr OTR/L

## 2021-01-22 NOTE — PLAN OF CARE
Problem: Falls - Risk of:  Goal: Will remain free from falls  Description: Will remain free from falls  Outcome: Ongoing  Goal: Absence of physical injury  Description: Absence of physical injury  Outcome: Ongoing     Problem: Skin Integrity:  Goal: Will show no infection signs and symptoms  Description: Will show no infection signs and symptoms  Outcome: Ongoing  Goal: Absence of new skin breakdown  Description: Absence of new skin breakdown  Outcome: Ongoing     Problem: Infection:  Goal: Will remain free from infection  Description: Will remain free from infection  Outcome: Ongoing

## 2021-01-23 LAB
BLOOD CULTURE, ROUTINE: NORMAL
CULTURE, BLOOD 2: NORMAL
GLUCOSE BLD-MCNC: 107 MG/DL (ref 70–99)
GLUCOSE BLD-MCNC: 116 MG/DL (ref 70–99)
GLUCOSE BLD-MCNC: 161 MG/DL (ref 70–99)
OCCULT BLOOD DIAGNOSTIC: NORMAL
PERFORMED ON: ABNORMAL

## 2021-01-23 PROCEDURE — 2060000000 HC ICU INTERMEDIATE R&B

## 2021-01-23 PROCEDURE — 2580000003 HC RX 258: Performed by: FAMILY MEDICINE

## 2021-01-23 PROCEDURE — 6370000000 HC RX 637 (ALT 250 FOR IP): Performed by: FAMILY MEDICINE

## 2021-01-23 PROCEDURE — 94760 N-INVAS EAR/PLS OXIMETRY 1: CPT

## 2021-01-23 PROCEDURE — 6360000002 HC RX W HCPCS: Performed by: FAMILY MEDICINE

## 2021-01-23 RX ADMIN — HYDRALAZINE HYDROCHLORIDE 25 MG: 25 TABLET, FILM COATED ORAL at 05:36

## 2021-01-23 RX ADMIN — ATORVASTATIN CALCIUM 20 MG: 20 TABLET, FILM COATED ORAL at 21:39

## 2021-01-23 RX ADMIN — NIFEDIPINE 30 MG: 30 TABLET, EXTENDED RELEASE ORAL at 08:30

## 2021-01-23 RX ADMIN — SODIUM CHLORIDE, PRESERVATIVE FREE 10 ML: 5 INJECTION INTRAVENOUS at 21:39

## 2021-01-23 RX ADMIN — HYDRALAZINE HYDROCHLORIDE 25 MG: 25 TABLET, FILM COATED ORAL at 14:30

## 2021-01-23 RX ADMIN — HYDRALAZINE HYDROCHLORIDE 25 MG: 25 TABLET, FILM COATED ORAL at 21:39

## 2021-01-23 RX ADMIN — CEFTRIAXONE 1000 MG: 1 INJECTION, POWDER, FOR SOLUTION INTRAMUSCULAR; INTRAVENOUS at 08:31

## 2021-01-23 RX ADMIN — ENOXAPARIN SODIUM 30 MG: 30 INJECTION SUBCUTANEOUS at 08:33

## 2021-01-23 RX ADMIN — POTASSIUM BICARBONATE 40 MEQ: 782 TABLET, EFFERVESCENT ORAL at 08:30

## 2021-01-23 RX ADMIN — SODIUM CHLORIDE, PRESERVATIVE FREE 10 ML: 5 INJECTION INTRAVENOUS at 08:33

## 2021-01-23 RX ADMIN — ASPIRIN 81 MG: 81 TABLET, CHEWABLE ORAL at 08:30

## 2021-01-23 RX ADMIN — PANTOPRAZOLE SODIUM 40 MG: 40 TABLET, DELAYED RELEASE ORAL at 08:31

## 2021-01-23 ASSESSMENT — PAIN SCALES - GENERAL
PAINLEVEL_OUTOF10: 0

## 2021-01-23 ASSESSMENT — PAIN SCALES - WONG BAKER: WONGBAKER_NUMERICALRESPONSE: 0

## 2021-01-23 NOTE — PLAN OF CARE
Problem: Falls - Risk of:  Goal: Will remain free from falls  Description: Will remain free from falls  1/23/2021 1044 by Joe Benitez RN  Outcome: Ongoing  1/23/2021 0451 by Myrna Johnson RN  Outcome: Ongoing  1/23/2021 0450 by Myrna Johnson RN  Outcome: Ongoing  Goal: Absence of physical injury  Description: Absence of physical injury  1/23/2021 1044 by Joe Benitez RN  Outcome: Ongoing  1/23/2021 0451 by Myrna Johnson RN  Outcome: Ongoing  1/23/2021 0450 by Myrna Johnson RN  Outcome: Ongoing     Problem: Skin Integrity:  Goal: Will show no infection signs and symptoms  Description: Will show no infection signs and symptoms  1/23/2021 1044 by Joe Benitez RN  Outcome: Ongoing  1/23/2021 0451 by Myrna Johnson RN  Outcome: Ongoing  1/23/2021 0450 by Myrna Johnson RN  Outcome: Ongoing  Goal: Absence of new skin breakdown  Description: Absence of new skin breakdown  1/23/2021 1044 by Joe Benitez RN  Outcome: Ongoing  1/23/2021 0451 by Myrna Johnson RN  Outcome: Ongoing  1/23/2021 0450 by Myrna Johnson RN  Outcome: Ongoing     Problem: Infection:  Goal: Will remain free from infection  Description: Will remain free from infection  1/23/2021 1044 by Joe Benitez RN  Outcome: Ongoing  1/23/2021 0451 by Myrna Johnson RN  Outcome: Ongoing  1/23/2021 0450 by Myrna Johnson RN  Outcome: Ongoing

## 2021-01-23 NOTE — PROGRESS NOTES
PM assessment complete, VSS. No acute S/S of distress noted at this time. Pt alert and oriented. Will continue to monitor.

## 2021-01-23 NOTE — PROGRESS NOTES
Physician Progress Note      PATIENT:               Richie Gage  CSN #:                  [id number]  :                       1940  ADMIT DATE:       2021 7:50 AM  DISCH DATE:  RESPONDING  PROVIDER #:        Nellie Pierer MD          QUERY TEXT:    Dear Dr Henry Dickson,    Pt admitted with AMS. Pt noted to have HTN in ED. If possible, please   document in progress notes and discharge summary if you are evaluating and/or   treating any of the following: The medical record reflects the following:  Risk Factors: HX- HTN, CVA, Tobacco abuse  Clinical Indicators: BP in ED- 188/96 - 201/134- 222/183 - 219/123 -   187/104. Lor Libman Lor Libman Per H&P-worsening left sided weakness, confusion, slurred speech and   decreased responsiveness  Treatment: hydralazine po, Procardia xl po, ...- resume nifedipine, - add   hydralazine,  - holding ace-I, hctz d/t daniel, - consider IV prn if remains elevated    Hypertensive Crisis, unspecified: at least 2 consecutive readings of SBP > 180   mmHg or DBP > 110 mmHg  - Hypertensive Urgency: Hypertensive crisis w/o associated organ dysfunction. S/s may or may not be present, but can include severe headache, SOB,   epistaxis, severe anxiety. Tx: adjustment of oral antihypertensives; IV meds   not usually required. - Hypertensive Emergency: Hypertensive crisis w/ associated organ damage   (stroke, encephalopathy, DANIEL, MI, angina, acute or decompensated CHF, acute   pulmonary edema, HELLP, etc.). Requires immediate treatment (usually IV meds)   & possible ICU admission. Associated organ dysfunction needs documented. [email or web address]    Thank You,  Alicia Castillo RN BSN CDS HAIDERR  [email or web address]  Options provided:  -- Hypertensive Urgency  -- Hypertensive Crisis  -- Hypertensive Emergency  -- Other - I will add my own diagnosis  -- Disagree - Not applicable / Not valid  -- Disagree - Clinically unable to determine / Unknown  -- Refer to Clinical Documentation Reviewer    PROVIDER RESPONSE TEXT:    This patient has hypertensive urgency.     Query created by: Orlin Manzo on 1/20/2021 9:24 AM      Electronically signed by:  Isaura Ballard MD 1/22/2021 7:46 PM

## 2021-01-23 NOTE — PLAN OF CARE
Problem: Falls - Risk of:  Goal: Will remain free from falls  Description: Will remain free from falls  1/23/2021 0450 by Herminia Hong RN  Outcome: Ongoing  1/22/2021 1827 by Jen Rodriguez RN  Outcome: Ongoing     Problem: Falls - Risk of:  Goal: Absence of physical injury  Description: Absence of physical injury  1/23/2021 0450 by Herminia Hong RN  Outcome: Ongoing  1/22/2021 1827 by Jen Rodriguez RN  Outcome: Ongoing     Problem: Skin Integrity:  Goal: Will show no infection signs and symptoms  Description: Will show no infection signs and symptoms  1/23/2021 0450 by Herminia Hong RN  Outcome: Ongoing  1/22/2021 1827 by Jen Rodriguez RN  Outcome: Ongoing     Problem: Infection:  Goal: Will remain free from infection  Description: Will remain free from infection  1/23/2021 0450 by Herminia Hong RN  Outcome: Ongoing  1/22/2021 1827 by Jen Rodriguez RN  Outcome: Ongoing     Problem: Pain:  Goal: Patient's pain/discomfort is manageable  Description: Patient's pain/discomfort is manageable  1/23/2021 0450 by Herminia Hong RN  Outcome: Ongoing  1/22/2021 1827 by Jen Rodriguez RN  Outcome: Ongoing

## 2021-01-23 NOTE — PROGRESS NOTES
Hospitalist Progress Note      PCP: [facility]    Date of Admission: 1/19/2021    Chief Complaint:   Chief Complaint   Patient presents with    Altered Mental Status     lkn 8pm per son, at baseline pt A&Ox3, able to hold conversation     Hypoglycemia     BS read low per squad, given 15g oral glucose  on repeat per squad      Hospital Course: Selma Flores y.o. female with PMH CVA, HTN, GERD, GIB 2/2 PUD, tobacco abuse who presented to [place] [address] with worsening left sided weakness, confusion, slurred speech and decreased responsiveness. She was treated by EMS for hypoglycemia, and was admitted for sepsis 2/2 UTI. Subjective: Feels better, wants to go home. Refusing SNF, not working with PT. Refused labs this AM.      Medications:  Reviewed    Infusion Medications    dextrose       Scheduled Medications    potassium bicarb-citric acid  40 mEq Oral Daily    cefTRIAXone (ROCEPHIN) IV  1,000 mg Intravenous Q24H    hydrALAZINE  25 mg Oral 3 times per day    aspirin  81 mg Oral Daily    sodium chloride flush  10 mL Intravenous 2 times per day    enoxaparin  30 mg Subcutaneous Daily    NIFEdipine  30 mg Oral Daily    pantoprazole  40 mg Oral Daily    atorvastatin  20 mg Oral Nightly     PRN Meds: traMADol, iopamidol, melatonin, sodium chloride flush, acetaminophen **OR** acetaminophen, glucose, dextrose, glucagon (rDNA), dextrose, labetalol      Intake/Output Summary (Last 24 hours) at 1/23/2021 1404  Last data filed at 1/23/2021 1150  Gross per 24 hour   Intake 0 ml   Output 0 ml   Net 0 ml       Physical Exam Performed:    /83   Pulse 97   Temp 99.1 °F (37.3 °C) (Oral)   Resp 16   Ht 5' 3\" (1.6 m)   Wt 95 lb 0.3 oz (43.1 kg)   SpO2 94%   BMI 16.83 kg/m²     General appearance: No apparent distress, appears stated age and cooperative. HEENT: Pupils equal, round, and reactive to light. Conjunctivae/corneas clear. Neck: Supple, with full range of motion.  Trachea midline. Respiratory:  Normal respiratory effort. Clear to auscultation, bilaterally without Rales/Wheezes/Rhonchi. Cardiovascular: Regular rate and rhythm with normal S1/S2 without murmurs, rubs or gallops. Abdomen: Soft, non-tender, non-distended with normal bowel sounds. Musculoskeletal: No clubbing, cyanosis or edema bilaterally. Full range of motion without deformity. Skin: Skin color, texture, turgor normal.  No rashes or lesions. Neurologic:  Neurovascularly intact without any focal sensory/motor deficits. Cranial nerves: II-XII intact, grossly non-focal.  Psychiatric: Alert and oriented, thought content appropriate, normal insight  Capillary Refill: Brisk,< 3 seconds   Peripheral Pulses: +2 palpable, equal bilaterally       Labs:   Recent Labs     01/21/21  0515   WBC 8.4   HGB 13.4   HCT 40.8        Recent Labs     01/20/21 2122 01/21/21  0515 01/22/21  0435   NA  --  141 139   K 3.7 3.1* 3.1*   CL  --  103 100   CO2  --  25 24   BUN  --  20 16   CREATININE  --  1.0 0.9   CALCIUM  --  8.6 8.9     No results for input(s): AST, ALT, BILIDIR, BILITOT, ALKPHOS in the last 72 hours. No results for input(s): INR in the last 72 hours. No results for input(s): Maryl Peek in the last 72 hours. Urinalysis:      Lab Results   Component Value Date    NITRU Negative 01/19/2021    WBCUA 13 01/19/2021    BACTERIA 1+ 09/29/2018    RBCUA 0-2 01/19/2021    BLOODU MODERATE 01/19/2021    SPECGRAV 1.021 01/19/2021    GLUCOSEU 100 01/19/2021    GLUCOSEU NEGATIVE 07/10/2010       Radiology:  CT HEAD WO CONTRAST   Preliminary Result   No definite evidence of acute intracranial abnormality on a study limited as   described above. Critical results were called by Dr. Kacy Chavira. Merrill Gregorio MD to [facility]   on 1/21/2021 at 12:35. MRI BRAIN WO CONTRAST   Final Result   No evidence for restricted diffusion. Diffuse volume loss chronic white   matter changes.       Incidental note is made of a intrasellar mass suspicious for macro adenoma   measuring 1.4 x 1.4 x 1.5 cm         XR CHEST PORTABLE   Final Result   No convincing evidence for acute cardiopulmonary pathology. Findings suggestive of COPD. CTA HEAD NECK W CONTRAST   Final Result   1. 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 This   is presumed chronic in nature. 2. Mild-to-moderate stenosis at the origin of the right vertebral artery. 3. Moderate focal stenosis of the left subclavian artery. 4. No hemodynamically significant stenosis or branch occlusion in the   cervical carotid arteries. 5. Moderate focal stenosis of M2 branch of left middle cerebral artery. Mild   stenosis within left M1 segment. 6. 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 CT HEAD WO CONTRAST   Final Result   No acute intracranial abnormality. Mild prominence of the pituitary gland is not significantly changed dating   back to 11/1/2018. Underlying adenoma would be difficult to exclude. If   clinically indicated this could be further evaluated with MRI. Critical results were called by Dr. Sunil Betts to [address] [place] [address] on   1/19/2021 at 08:32. Assessment/Plan:    Active Hospital Problems    Diagnosis    Sepsis ([facility] [address] [A41.9]     Acute metabolic encephalopathy, improved  Due to hypoglycemia and Sepsis.  CT head negative for acute abnormality  - treat accordingly and monitor   - Speech thearpy - dysphagia diet  - MRI brain - no acute CVA  - neuro checks  - pt/ot     Stroke alert 1/21- episode of alertered awareness, aphasia, weakness  - CT head - no acute changes  - concern for possible TIAs  - neurology c/s - recommends resuming antiplatelet, will start asa 81 mg and monitor closely given hx GIB     UTI  - urine culture - aerococcus species, pending speciation as patient is allergic to penicillin  - c/w rocephin     Sepsis POA - improving  Afebrile, leukocytosis resolved  - bl cx ngtd  - abx as above     Lactic acidosis   - resolved     DANIEL   - improved,  - hold ace-I, hctz  - monitor     Hypokalemia  - replace, monitor   - refusing labs     Hypoglycemia - resolved  - d/c IVF  - protocol prn, monitor poc glucose levels     Incidental intrasellar mass suspicious for macro adenoma  - OP w/u      HTN - elevated  - c/w nifedipine, hydralazine  - holding ace-I, hctz d/t daniel     Prolonged QTc - repeat EKG - improved      HLD - statin     Hx cva - left sided weakness    DVT Prophylaxis: Lovenox  Diet: DIET DYSPHAGIA PUREED;  Code Status: Full Code    PT/OT Eval Status: ordered, refusing    Dispo - pending speciation of urine culture    Beto Gordillo MD    This note was transcribed using Dragon Dictation software. Please disregard any translational errors.

## 2021-01-24 LAB
ALBUMIN SERPL-MCNC: 2.7 G/DL (ref 3.4–5)
ANION GAP SERPL CALCULATED.3IONS-SCNC: 13 MMOL/L (ref 3–16)
BUN BLDV-MCNC: 14 MG/DL (ref 7–20)
CALCIUM SERPL-MCNC: 8.5 MG/DL (ref 8.3–10.6)
CHLORIDE BLD-SCNC: 93 MMOL/L (ref 99–110)
CO2: 19 MMOL/L (ref 21–32)
CREAT SERPL-MCNC: 1.1 MG/DL (ref 0.6–1.2)
GFR AFRICAN AMERICAN: 58
GFR NON-AFRICAN AMERICAN: 48
GLUCOSE BLD-MCNC: 122 MG/DL (ref 70–99)
GLUCOSE BLD-MCNC: 91 MG/DL (ref 70–99)
HCT VFR BLD CALC: 46.4 % (ref 36–48)
HEMOGLOBIN: 14 G/DL (ref 12–16)
MCH RBC QN AUTO: 25.5 PG (ref 26–34)
MCHC RBC AUTO-ENTMCNC: 30.2 G/DL (ref 31–36)
MCV RBC AUTO: 84.5 FL (ref 80–100)
PDW BLD-RTO: 17.5 % (ref 12.4–15.4)
PERFORMED ON: NORMAL
PHOSPHORUS: 1.9 MG/DL (ref 2.5–4.9)
PLATELET # BLD: 233 K/UL (ref 135–450)
PMV BLD AUTO: 7.4 FL (ref 5–10.5)
POTASSIUM SERPL-SCNC: 4.5 MMOL/L (ref 3.5–5.1)
RBC # BLD: 5.49 M/UL (ref 4–5.2)
REASON FOR REJECTION: NORMAL
REJECTED TEST: NORMAL
SODIUM BLD-SCNC: 125 MMOL/L (ref 136–145)
WBC # BLD: 14.9 K/UL (ref 4–11)

## 2021-01-24 PROCEDURE — 6370000000 HC RX 637 (ALT 250 FOR IP): Performed by: FAMILY MEDICINE

## 2021-01-24 PROCEDURE — 94760 N-INVAS EAR/PLS OXIMETRY 1: CPT

## 2021-01-24 PROCEDURE — 2580000003 HC RX 258: Performed by: FAMILY MEDICINE

## 2021-01-24 PROCEDURE — 85027 COMPLETE CBC AUTOMATED: CPT

## 2021-01-24 PROCEDURE — 2060000000 HC ICU INTERMEDIATE R&B

## 2021-01-24 PROCEDURE — 2580000003 HC RX 258: Performed by: INTERNAL MEDICINE

## 2021-01-24 PROCEDURE — 6360000002 HC RX W HCPCS: Performed by: INTERNAL MEDICINE

## 2021-01-24 PROCEDURE — 80069 RENAL FUNCTION PANEL: CPT

## 2021-01-24 PROCEDURE — 6360000002 HC RX W HCPCS: Performed by: FAMILY MEDICINE

## 2021-01-24 PROCEDURE — 99223 1ST HOSP IP/OBS HIGH 75: CPT | Performed by: INTERNAL MEDICINE

## 2021-01-24 PROCEDURE — 36415 COLL VENOUS BLD VENIPUNCTURE: CPT

## 2021-01-24 RX ADMIN — ENOXAPARIN SODIUM 30 MG: 30 INJECTION SUBCUTANEOUS at 09:23

## 2021-01-24 RX ADMIN — PANTOPRAZOLE SODIUM 40 MG: 40 TABLET, DELAYED RELEASE ORAL at 09:22

## 2021-01-24 RX ADMIN — VANCOMYCIN HYDROCHLORIDE 750 MG: 750 INJECTION, POWDER, LYOPHILIZED, FOR SOLUTION INTRAVENOUS at 12:32

## 2021-01-24 RX ADMIN — HYDRALAZINE HYDROCHLORIDE 25 MG: 25 TABLET, FILM COATED ORAL at 14:43

## 2021-01-24 RX ADMIN — CEFTRIAXONE 2000 MG: 2 INJECTION, POWDER, FOR SOLUTION INTRAMUSCULAR; INTRAVENOUS at 18:19

## 2021-01-24 RX ADMIN — NIFEDIPINE 30 MG: 30 TABLET, EXTENDED RELEASE ORAL at 09:22

## 2021-01-24 RX ADMIN — SODIUM CHLORIDE, PRESERVATIVE FREE 10 ML: 5 INJECTION INTRAVENOUS at 09:22

## 2021-01-24 RX ADMIN — TRAMADOL HYDROCHLORIDE 50 MG: 50 TABLET ORAL at 18:22

## 2021-01-24 RX ADMIN — ACETAMINOPHEN 650 MG: 325 TABLET ORAL at 16:50

## 2021-01-24 RX ADMIN — ASPIRIN 81 MG: 81 TABLET, CHEWABLE ORAL at 09:22

## 2021-01-24 RX ADMIN — POTASSIUM BICARBONATE 40 MEQ: 782 TABLET, EFFERVESCENT ORAL at 09:22

## 2021-01-24 RX ADMIN — HYDRALAZINE HYDROCHLORIDE 25 MG: 25 TABLET, FILM COATED ORAL at 06:04

## 2021-01-24 RX ADMIN — SODIUM CHLORIDE, PRESERVATIVE FREE 10 ML: 5 INJECTION INTRAVENOUS at 19:58

## 2021-01-24 RX ADMIN — HYDRALAZINE HYDROCHLORIDE 25 MG: 25 TABLET, FILM COATED ORAL at 21:56

## 2021-01-24 RX ADMIN — ATORVASTATIN CALCIUM 20 MG: 20 TABLET, FILM COATED ORAL at 19:58

## 2021-01-24 ASSESSMENT — PAIN SCALES - GENERAL
PAINLEVEL_OUTOF10: 0
PAINLEVEL_OUTOF10: 0
PAINLEVEL_OUTOF10: 5

## 2021-01-24 ASSESSMENT — PAIN SCALES - WONG BAKER
WONGBAKER_NUMERICALRESPONSE: 0
WONGBAKER_NUMERICALRESPONSE: 0

## 2021-01-24 NOTE — CONSULTS
Clinical Pharmacy Note  Vancomycin Consult    Brea Munguia is a 79 y.o. female ordered Vancomycin for UTI; consult received from Dr. Francisco Ruiz to manage therapy. Patient Active Problem List   Diagnosis    Age-related osteoporosis with current pathol fracture of vertebra ([facility]    Acute right-sided low back pain with sciatica    History of CVA with residual deficit    Tobacco abuse    Multiple closed fractures of ribs    Essential hypertension, benign    Generalized weakness    Hyperlipidemia    Fall against object    Hypokalemia    Severe protein-calorie malnutrition Rosalva Regulus: less than 60% of standard weight) ([facility] [facility]    Hypertensive urgency    Lower GI bleed    Sepsis ([facility]       Allergies:  Pcn [penicillins]     Temp max:  Temp (24hrs), Av.6 °F (37 °C), Min:97.9 °F (36.6 °C), Max:99.4 °F (37.4 °C)      Recent Labs     21  0806   WBC 14.9*       Recent Labs     21  0435   BUN 16   CREATININE 0.9         Intake/Output Summary (Last 24 hours) at 2021 1136  Last data filed at 2021 1612  Gross per 24 hour   Intake 60 ml   Output --   Net 60 ml       Culture Results:  Aerococcus species    Ht Readings from Last 1 Encounters:   21 5' 3\" (1.6 m)        Wt Readings from Last 1 Encounters:   21 95 lb 0.3 oz (43.1 kg)         CrCl cannot be calculated (Unknown ideal weight. ). Assessment/Plan:  Vancomycin 750 mg IV once followed by 500 mg IV every 24 hours ordered. Regimen projects a trough level of 10-15 mg/L. Level ordered for 21 1100. Thank you for the consult.

## 2021-01-24 NOTE — PROGRESS NOTES
Pt had slight fever today and is complaining of left leg pain that is not resolving with tylenol and tramadol. Will continue to monitor and provide comfort.

## 2021-01-24 NOTE — PROGRESS NOTES
Hospitalist Progress Note      PCP: [address]    Date of Admission: 1/19/2021    Chief Complaint:   Chief Complaint   Patient presents with    Altered Mental Status     lkn 8pm per son, at baseline pt A&Ox3, able to hold conversation     Hypoglycemia     BS read low per squad, given 15g oral glucose  on repeat per squad      Hospital Course: Tyrone Flores y.o. female with PMH CVA, HTN, GERD, GIB 2/2 PUD, tobacco abuse who presented to [place] [address] with worsening left sided weakness, confusion, slurred speech and decreased responsiveness. She was treated by EMS for hypoglycemia, and was admitted for sepsis 2/2 UTI. Urine culture growing Aerococcus, patient is allergic to penicillin so antibiotics choice complicated. Sent outside lab for susceptibilities, pending. Symptomatic improvement on Rocephin, refused labs for a few days and now with increased white count. Code stroke called 1/21, negative work-up. Subjective: Feeling okay this morning, no abdominal pain, nausea, vomiting. Daughter at bedside was updated on plan of care, feels strongly that her mom to go to nursing facility and is trying to convince her.     Medications:  Reviewed    Infusion Medications    dextrose       Scheduled Medications    potassium bicarb-citric acid  40 mEq Oral Daily    cefTRIAXone (ROCEPHIN) IV  1,000 mg Intravenous Q24H    hydrALAZINE  25 mg Oral 3 times per day    aspirin  81 mg Oral Daily    sodium chloride flush  10 mL Intravenous 2 times per day    enoxaparin  30 mg Subcutaneous Daily    NIFEdipine  30 mg Oral Daily    pantoprazole  40 mg Oral Daily    atorvastatin  20 mg Oral Nightly     PRN Meds: traMADol, iopamidol, melatonin, sodium chloride flush, acetaminophen **OR** acetaminophen, glucose, dextrose, glucagon (rDNA), dextrose, labetalol      Intake/Output Summary (Last 24 hours) at 1/24/2021 0916  Last data filed at 1/23/2021 1612  Gross per 24 hour   Intake 60 ml Output --   Net 60 ml       Physical Exam Performed:    BP (!) 152/83   Pulse 108   Temp 98.3 °F (36.8 °C) (Oral)   Resp 16   Ht 5' 3\" (1.6 m)   Wt 95 lb 0.3 oz (43.1 kg)   SpO2 95%   BMI 16.83 kg/m²     General appearance: No apparent distress, appears stated age and cooperative. HEENT: Pupils equal, round, and reactive to light. Conjunctivae/corneas clear. Neck: Supple, with full range of motion. Trachea midline. Respiratory:  Normal respiratory effort. Clear to auscultation, bilaterally without Rales/Wheezes/Rhonchi. Cardiovascular: Regular rate and rhythm with normal S1/S2 without murmurs, rubs or gallops. Abdomen: Soft, non-tender, non-distended with normal bowel sounds. Musculoskeletal: No clubbing, cyanosis or edema bilaterally. Full range of motion without deformity. Skin: Skin color, texture, turgor normal.  No rashes or lesions. Neurologic:  Neurovascularly intact without any focal sensory/motor deficits. Cranial nerves: II-XII intact, grossly non-focal.  Psychiatric: Alert and oriented, thought content appropriate, normal insight  Capillary Refill: Brisk,< 3 seconds   Peripheral Pulses: +2 palpable, equal bilaterally       Labs:   Recent Labs     01/24/21  0806   WBC 14.9*   HGB 14.0   HCT 46.4        Recent Labs     01/22/21  0435      K 3.1*      CO2 24   BUN 16   CREATININE 0.9   CALCIUM 8.9     No results for input(s): AST, ALT, BILIDIR, BILITOT, ALKPHOS in the last 72 hours. No results for input(s): INR in the last 72 hours. No results for input(s): Towana Malika in the last 72 hours.     Urinalysis:      Lab Results   Component Value Date    NITRU Negative 01/19/2021    WBCUA 13 01/19/2021    BACTERIA 1+ 09/29/2018    RBCUA 0-2 01/19/2021    BLOODU MODERATE 01/19/2021    SPECGRAV 1.021 01/19/2021    GLUCOSEU 100 01/19/2021    GLUCOSEU NEGATIVE 07/10/2010       Radiology:  CT HEAD WO CONTRAST   Preliminary Result   No definite evidence of acute intracranial pt/ot     Stroke alert 1/21- episode of alertered awareness, aphasia, weakness  - CT head - no acute changes  - concern for possible TIAs  - neurology c/s - recommends resuming antiplatelet, will start asa 81 mg and monitor closely given hx GIB     UTI  Urine culture - aerococcus species, pending speciation as patient is allergic to penicillin  - on rocephin-- clinical improvement though WBC uptrending. Spoke to lab 1/23 and 1/24, culture sent to outside lab and sensitivities still pending  - will switch to vanc pending sensitivities with increased white blood cell count  - ID consult-- patient refused labs for a few days, WBC now increased though clinically improved     Sepsis POA - improving  Afebrile, leukocytosis resolved. 102 on admission, WBC 14  - bl cx ngtd  - abx as above     Lactic acidosis   - resolved     DANIEL   - improved,  - hold ace-I, hctz  - monitor     Hypokalemia  - replace, monitor   - refusing labs     Hypoglycemia - resolved  - d/c IVF  - protocol prn, monitor poc glucose levels     Incidental intrasellar mass suspicious for macro adenoma  - OP w/u      HTN - elevated  - c/w nifedipine, hydralazine  - holding ace-I, hctz d/t daniel     Prolonged QTc - repeat EKG - improved      HLD - statin     Hx cva - left sided weakness    DVT Prophylaxis: Lovenox  Diet: DIET DYSPHAGIA PUREED;  Code Status: Full Code    PT/OT Eval Status: ordered, refusing    Dispo -PT recommending SNF, patient initially refused but daughter feels strongly that she needs to go to a facility and is trying to convince her. Discussed with social work. Lori Larose MD    This note was transcribed using [id number] [employer]. Please disregard any translational errors.

## 2021-01-24 NOTE — PLAN OF CARE
Problem: Falls - Risk of:  Goal: Will remain free from falls  Description: Will remain free from falls  1/23/2021 2337 by Angelica Boudreaux RN  Outcome: Ongoing     Problem: Falls - Risk of:  Goal: Absence of physical injury  Description: Absence of physical injury  1/23/2021 2337 by Angelica Boudreaux RN  Outcome: Ongoing     Problem: Skin Integrity:  Goal: Will show no infection signs and symptoms  Description: Will show no infection signs and symptoms  1/23/2021 2337 by Angelica Boudreaux RN  Outcome: Ongoing     Problem: Skin Integrity:  Goal: Absence of new skin breakdown  Description: Absence of new skin breakdown  1/23/2021 2337 by Angelica Boudreaux RN  Outcome: Ongoing     Problem: Safety:  Goal: Free from accidental physical injury  Description: Free from accidental physical injury  1/23/2021 2337 by Angelica Boudreaux RN  Outcome: Ongoing     Problem: Daily Care:  Goal: Daily care needs are met  Description: Daily care needs are met  1/23/2021 2337 by Angelica Boudreaux RN  Outcome: Ongoing     Problem: Pain:  Goal: Patient's pain/discomfort is manageable  Description: Patient's pain/discomfort is manageable  1/23/2021 2337 by Angelica Boudreaux RN  Outcome: Ongoing     Problem: Injury - Risk of, Physical Injury:  Goal: Will remain free from falls  Description: Will remain free from falls  1/23/2021 2337 by Angelica Boudreaux RN  Outcome: Ongoing     Problem: Injury - Risk of, Physical Injury:  Goal: Absence of physical injury  Description: Absence of physical injury  1/23/2021 2337 by Angelica Boudreaux RN  Outcome: Ongoing

## 2021-01-24 NOTE — PLAN OF CARE
Problem: Falls - Risk of:  Goal: Will remain free from falls  Description: Will remain free from falls  Outcome: Ongoing  Goal: Absence of physical injury  Description: Absence of physical injury  Outcome: Ongoing     Problem: Falls - Risk of:  Goal: Will remain free from falls  Description: Will remain free from falls  Outcome: Ongoing  Goal: Absence of physical injury  Description: Absence of physical injury  Outcome: Ongoing     Problem: Skin Integrity:  Goal: Will show no infection signs and symptoms  Description: Will show no infection signs and symptoms  Outcome: Ongoing  Goal: Absence of new skin breakdown  Description: Absence of new skin breakdown  Outcome: Ongoing     Problem: Infection:  Goal: Will remain free from infection  Description: Will remain free from infection  Outcome: Ongoing     Problem: Safety:  Goal: Free from accidental physical injury  Description: Free from accidental physical injury  Outcome: Ongoing  Goal: Free from intentional harm  Description: Free from intentional harm  Outcome: Ongoing

## 2021-01-24 NOTE — CONSULTS
Infectious Diseases Inpatient Consult Note      Reason for Consult:  UTI and WBC elevation with change in Mentation     Requesting Physician:  [facility]     Primary Care Physician:  [facility]    History Obtained From:  Epic and patient, family     CHIEF COMPLAINT:     Chief Complaint   Patient presents with    Altered Mental Status     lkn 8pm per son, at baseline pt A&Ox3, able to hold conversation     Hypoglycemia     BS read low per squad, given 15g oral glucose  on repeat per squad          HISTORY OF PRESENT ILLNESS:  79 y.o. woman with a past history of arthritis, diverticulitis, hypertension, CVA with left-sided weakness, smoking, admitted to the hospital with acute change in mental status. Per HPI her son found her to be less responsive confused and not able to follow commands EMS was called found to have severe hypoglycemia was corrected on the field by EMS with some improvement in clinical status but noted to have significant slurring of speech and fever of 102 on admission . Admit labs indicate creatinine at 1.4 lactic acid 5.9 BNP elevated at 3078, WBC count elevated 14.3. Blood cultures from admission negative and urine culture isolated Aerococcus species sensitivities pending. Urine analysis was abnormal on admission. She received IV antibiotic in the hospital WBC trending down but now again WBC rising up with low-grade temperatures we are consulted for recommendations. MRI of the brain indicates diffuse volume loss of chronic white matter disease with incidental note of intrasellar mass suspicion for macroadenoma.   Chest x-ray on admission negative for aspiration but findings suggestive of COPD/    location :       Quality :       Severity :     Duration :      Timing :  Context :     Modifying factors :  Associated signs and symptoms:        Past Medical History:    Past Medical History:   Diagnosis Date    CARROLL positive     Arthritis     Bleeding stomach ulcer     Diverticulitis     GERD (gastroesophageal reflux disease)     Hypertension     Stroke [facility]) 2002    left weakness     Tobacco abuse        Past Surgical History:    Past Surgical History:   Procedure Laterality Date    CHOLECYSTECTOMY  2014    COLONOSCOPY  2011    COLONOSCOPY N/A 12/31/2019    COLONOSCOPY DIAGNOSTIC performed by Mack Calhoun DO at [address], TOTAL ABDOMINAL  1970's    JACQUELYN/BSO for 'tumor'       Current Medications:    Outpatient Medications Marked as Taking for the 1/19/21 encounter [facility] Encounter)   Medication Sig Dispense Refill    docusate sodium (COLACE) 100 MG capsule Take 100 mg by mouth daily      calcium-vitamin D (OSCAL-500) 500-200 MG-UNIT per tablet Take 1 tablet by mouth 2 times daily      lisinopril (PRINIVIL;ZESTRIL) 20 MG tablet Take 20 mg by mouth daily      traZODone (DESYREL) 50 MG tablet Take 50 mg by mouth nightly as needed for Sleep      pantoprazole (PROTONIX) 40 MG tablet Take 40 mg by mouth daily      oxybutynin (DITROPAN) 5 MG tablet Take 5 mg by mouth 2 times daily as needed (bladder spasms)      simvastatin (ZOCOR) 40 MG tablet Take 40 mg by mouth nightly      traMADol (ULTRAM) 50 MG tablet Take 50 mg by mouth every 6 hours as needed.       NIFEdipine (ADALAT CC) 30 MG extended release tablet Take 1 tablet by mouth daily 30 tablet 0    hydrochlorothiazide (HYDRODIURIL) 25 MG tablet Take 1 tablet by mouth daily 30 tablet 0       Allergies:  Pcn [penicillins]    Immunizations :   Immunization History   Administered Date(s) Administered    Influenza Virus Vaccine 01/24/2014, 12/05/2014, 02/09/2017    Influenza, High Dose (Fluzone 65 yrs and older) 10/17/2017    Influenza, Quadv, IM, PF (6 mo and older Fluzone, Flulaval, Fluarix, and 3 yrs and older Afluria) 11/20/2016    Pneumococcal Conjugate 13-valent ([id number]) 10/17/2017    Pneumococcal Polysaccharide (Chyyekqya43) 02/01/2005, 01/23/2014 Social History:   Social History     Tobacco Use    Smoking status: Current Every Day Smoker     Packs/day: 0.25     Years: 50.00     Pack years: 12.50     Types: Cigarettes    Smokeless tobacco: Never Used   Substance Use Topics    Alcohol use: No    Drug use: No     Social History     Tobacco Use   Smoking Status Current Every Day Smoker    Packs/day: 0.25    Years: 50.00    Pack years: 12.50    Types: Cigarettes   Smokeless Tobacco Never Used      Family History   Problem Relation Age of Onset    High Blood Pressure Mother     High Blood Pressure Father     High Blood Pressure Daughter     Cancer Son        REVIEW OF SYSTEMS:    Constitutional:   fevers + , chills, night sweats  Eyes:  negative for blurred vision, eye discharge, visual disturbance   HEENT:  negative for hearing loss, ear drainage,nasal congestion  Respiratory:  negative for cough, shortness of breath or hemoptysis   Cardiovascular:  negative for chest pain, palpitations, syncope  Gastrointestinal:  negative for nausea, vomiting, diarrhea, constipation, abdominal pain  Genitourinary:  negative for frequency, dysuria, urinary incontinence + , hematuria  Hematologic/Lymphatic:  negative for easy bruising, bleeding and lymphadenopathy  Allergic/Immunologic:  negative for recurrent infections, angioedema, anaphylaxis   Endocrine:  negative for weight changes, polyuria, polydipsia and polyphagia  Musculoskeletal:  negative for joint  pain, swelling, decreased range of motion  Integumentary: No rashes, skin lesions  Neurological:  Left side weakness old,  slurred speech, unilateral weakness  Psychiatric: negative for hallucinations,confusion,agitation.      PHYSICAL EXAM:      Vitals:  T max  102.3 on admit   /83   Pulse 97   Temp 98.7 °F (37.1 °C) (Oral)   Resp 16   Ht 5' 3\" (1.6 m)   Wt 95 lb 0.3 oz (43.1 kg)   SpO2 96%   BMI 16.83 kg/m²     General Appearance: alert,in no acute distress, + pallor, no icterus   Skin: warm and dry, no rash or erythema  Head: normocephalic and atraumatic  Eyes: pupils equal, round, and reactive to light, conjunctivae normal  ENT: tympanic membrane, external ear and ear canal normal bilaterally, nose without deformity, nasal mucosa and turbinates normal without polyps  Neck: supple and non-tender without mass, no thyromegaly  no cervical lymphadenopathy  Pulmonary/Chest: clear to auscultation bilaterally- no wheezes, rales or rhonchi, normal air movement, no respiratory distress  Cardiovascular: normal rate, regular rhythm, normal S1 and S2, no murmurs, rubs, clicks, or gallops, no carotid bruits  Abdomen: soft, non-tender, non-distended, normal bowel sounds, no masses or organomegaly  Extremities: no cyanosis, clubbing or edema  Musculoskeletal: normal range of motion, no joint swelling, deformity or tenderness  Integumentary: No rashes, no abnormal skin lesions, no petechiae  Neurologic:  Left side weakness from previous CVA   Lines: iv    DATA:    CBC:   Lab Results   Component Value Date    WBC 14.9 (H) 01/24/2021    HGB 14.0 01/24/2021    HCT 46.4 01/24/2021    MCV 84.5 01/24/2021     01/24/2021     RENAL:   Lab Results   Component Value Date    CREATININE 0.9 01/22/2021    BUN 16 01/22/2021     01/22/2021    K 3.1 (L) 01/22/2021     01/22/2021    CO2 24 01/22/2021     SED RATE: No results found for: SEDRATE  CK: No results found for: CKTOTAL  CRP: No results found for: CRP  Hepatic Function Panel:   Lab Results   Component Value Date    ALKPHOS 110 01/19/2021    ALT 11 01/19/2021    AST 33 01/19/2021    PROT 9.0 01/19/2021    PROT 8.2 10/04/2011    BILITOT 0.7 01/19/2021    BILIDIR <0.2 11/20/2016    IBILI see below 11/20/2016    LABALBU 4.1 01/19/2021     UA:  Lab Results   Component Value Date    COLORU YELLOW 01/19/2021    CLARITYU TURBID 01/19/2021    GLUCOSEU 100 01/19/2021    GLUCOSEU NEGATIVE 07/10/2010    BILIRUBINUR Negative 01/19/2021    BILIRUBINUR NEGATIVE 07/10/2010    KETUA 15 01/19/2021    SPECGRAV 1.021 01/19/2021    BLOODU MODERATE 01/19/2021    PHUR 5.5 01/19/2021    PROTEINU >=300 01/19/2021    UROBILINOGEN 1.0 01/19/2021    NITRU Negative 01/19/2021    LEUKOCYTESUR MODERATE 01/19/2021    LABMICR YES 01/19/2021    URINETYPE NotGiven 01/19/2021      Urine Microscopic:   Lab Results   Component Value Date    BACTERIA 1+ 09/29/2018    COMU see below 01/19/2021    HYALCAST 0-2 01/19/2021    WBCUA 13 01/19/2021    RBCUA 0-2 01/19/2021    EPIU 19 01/19/2021     Urine Reflex to Culture:   Lab Results   Component Value Date    URRFLXCULT Yes 01/19/2021       Wbc  14.9   19/2021  9:14 AM - Swoh Incoming Lab Results From Soft (Epic Adt)    Component Value Ref Range & Units Status Collected Lab   Hyaline Casts, UA 0-2  0 - 2 /LPF Final 01/19/2021  8:16 AM [facility]   RBC, UA 0-2  0 - 4 /HPF Final 01/19/2021  8:16 AM [facility]   Urinalysis Comments see below   Final 01/19/2021  8:16 AM [facility]   Automated urinalysis results confirmed by microscopic. WBC, UA 13High   0 - 5 /HPF Final 01/19/2021  8:16 AM [facility]   Epithelial Cells, UA 19High   0 - 5 /HPF Final 01/19/2021  8:16 AM [facility]   Urinalysis microscopic performed using the   automated methodology (AUWI analyzer). Testing Performed By        Creat 1.4     Lactic acid  5.9  Down to  1.8     Procal  1.04     BNP  3078     Wbc  14.3         MICRO: cultures reviewed and updated by me   1/19/21 1702       Order Status: Completed Specimen: Blood Updated: 01/23/21 2015    Culture, Blood 2 No Growth after 4 days of incubation. Narrative:     ORDER#: [id number]                          ORDERED BY: Morgan Armando   SOURCE: Blood                              COLLECTED:  01/19/21 17:02   ANTIBIOTICS AT [place]IT[place]. :                      RECEIVED :  01/19/21 17:10   If child <=2 yrs old please draw pediatric bottle. ~Blood Culture #2   Performed at:   [facility]) [facility]   [address] [facility] [address] [facility] [address] [facility] [place] [employer] [address]   Phone [phone]   Culture, Blood 1 [[id number]] Collected: 01/19/21 1001   Order Status: Completed Specimen: Blood Updated: 01/23/21 1115    Blood Culture, Routine No Growth after 4 days of incubation. Narrative:     ORDER#: [id number]                          ORDERED BY: Juliette Alvarez   SOURCE: Blood                              COLLECTED:  01/19/21 10:01   ANTIBIOTICS AT [place]PIT[place]. :                      RECEIVED :  01/19/21 10:11   If child <=2 yrs old please draw pediatric bottle. ~Blood Culture 1   Performed at:   [facility]   [address] [facility] [address] [facility] [place] [employer]   Phone [phone]   Culture, Urine [[id number]] (Abnormal) Collected: 01/19/21 0816   Order Status: Completed Specimen: Urine, clean catch Updated: 01/22/21 0713    Organism Aerococcus speciesAbnormal     Urine Culture, Routine --    >100,000 CFU/ml   Doctor requested sensitivity   Sent to Reference Lab for further identification and sensitivity    Narrative:     ORDER#: [id number]                          ORDERED BY: Shayla Yao   SOURCE: Urine Clean Catch                  COLLECTED:  01/19/21 08:16   ANTIBIOTICS AT [place]IT[place]. :                      RECEIVED :  01/19/21 09:21   Performed at:   [facility]   [address] [facility] [address] [facility] [place] [employer] [address] [place] [address]   Phone [phone]         Blood Culture:   Lab Results   Component Value Date    BC No Growth after 4 days of incubation. 01/19/2021    BLOODCULT2 No Growth after 4 days of incubation. 01/19/2021       Viral Culture:    Lab Results   Component Value Date    COVID19 Not Detected 01/19/2021     Urine Culture: No results for input(s): [place] Larger in the last 72 hours.     Scheduled Meds:   [START ON 1/25/2021] vancomycin  500 mg Intravenous Q24H    vancomycin (VANCOCIN) intermittent dosing (placeholder)   Other RX Placeholder    potassium bicarb-citric acid  40 mEq Oral Daily    hydrALAZINE  25 mg Oral 3 times per day    aspirin  81 mg Oral Daily    sodium chloride flush  10 mL Intravenous 2 times per day    enoxaparin  30 mg Subcutaneous Daily    NIFEdipine  30 mg Oral Daily    pantoprazole  40 mg Oral Daily    atorvastatin  20 mg Oral Nightly       Continuous Infusions:   dextrose         PRN Meds:  traMADol, iopamidol, melatonin, sodium chloride flush, acetaminophen **OR** acetaminophen, glucose, dextrose, glucagon (rDNA), dextrose, labetalol    Imaging:   CT HEAD WO CONTRAST   Preliminary Result   No definite evidence of acute intracranial abnormality on a study limited as   described above. Critical results were called by Dr. Molly Willis. Andreas Ash MD to Mo Iglesiasd   on 1/21/2021 at 12:35. MRI BRAIN WO CONTRAST   Final Result   No evidence for restricted diffusion. Diffuse volume loss chronic white   matter changes. Incidental note is made of a intrasellar mass suspicious for macro adenoma   measuring 1.4 x 1.4 x 1.5 cm         XR CHEST PORTABLE   Final Result   No convincing evidence for acute cardiopulmonary pathology. Findings suggestive of COPD. CTA HEAD NECK W CONTRAST   Final Result   1. 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 This   is presumed chronic in nature. 2. Mild-to-moderate stenosis at the origin of the right vertebral artery. 3. Moderate focal stenosis of the left subclavian artery. 4. No hemodynamically significant stenosis or branch occlusion in the   cervical carotid arteries. 5. Moderate focal stenosis of M2 branch of left middle cerebral artery. Mild   stenosis within left M1 segment. 6. 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 CT HEAD WO CONTRAST   Final Result   No acute intracranial abnormality.       Mild prominence of the pituitary gland is not significantly changed dating   back to 11/1/2018. Underlying adenoma would be difficult to exclude. If   clinically indicated this could be further evaluated with MRI. Critical results were called by Dr. Yamila Rhodes to [facility] on   1/19/2021 at 08:32. All pertinent images and reports for the current Hospitalization were reviewed by me. IMPRESSION:    Patient Active Problem List   Diagnosis    Age-related osteoporosis with current pathol fracture of vertebra ([address]    Acute right-sided low back pain with sciatica    History of CVA with residual deficit    Tobacco abuse    Multiple closed fractures of ribs    Essential hypertension, benign    Generalized weakness    Hyperlipidemia    Fall against object    Hypokalemia    Severe protein-calorie malnutrition (Willetta Daquan: less than 60% of standard weight) ([address]    Hypertensive urgency    Lower GI bleed    Sepsis ([address]     Sepsis on admission  Severe hypoglycemia before admission  Change in mental status  Metabolic encephalopathy   History of CVA with left-sided weakness  MRI brain negative  Chronic smoking  History of GI bleed  Urinary tract infection  Fevers  Lactic acidosis on admission  WBC elevation on admission  HbA1c  5.3   DANIEL on admit     Change in mental status on admission secondary to severe hypoglycemia sepsis and fevers all seems to be improved. Blood cultures remain negative urine culture positive. WBC still remain elevated. Given prior history of stroke there is some concern if she had aspiration event. Agree with IV antibiotic coverage. Labs, Microbiology, Radiology and pertinent results from current hospitalization and care every where were reviewed by me as a part of the consultation. PLAN :  1. Cont IV Vancomycin   2. Added IV Ceftriaxone x 2 gm x q 24 HRS  3. Aerococcus can be a true pathogen in elderly patients and given positive cx agree with treatment  4.  Its usually sensitive to Cephalosporins and Ceftriaxone will cover UTI and possible aspiration  5. Trend WBC and Procal   6. Will choose oral abx when ready   7. She was volume depleted, DANIEL and hypoglycemia given prior CVA wonder if she had seizure episode given high lactic acidosis       Discussed with patient/Family and Nursing   Risk of Complications/Morbidity: High      · Illness(es)/ Infection present that pose threat to bodily function. · There is potential for severe exacerbation of infection/side effects of treatment. · Therapy requires intensive monitoring for antimicrobial agent toxicity. Thanks for allowing me to participate in your patient's care please call me with any questions or concerns.     Dr. Blu Swain MD  [address] [place] [address] Physician  Phone: [phone]   Fax : [phone]

## 2021-01-24 NOTE — DISCHARGE INSTR - COC
Continuity of Care Form    Patient Name: Sebas Soto   :  1940  MRN:  [id number]    Admit date:  2021  Discharge date:  2021    Code Status Order: Full Code   Advance Directives:   Advance Care Flowsheet Documentation       Date/Time Healthcare Directive Type of Healthcare Directive Copy in [address] Agent's Name Healthcare Agent's Phone Number    21 1026  Yes, patient has an advance directive for healthcare treatment  Durable power of  for health care  No, copy requested from family  --  --  --    21 1531  No, patient does not have an advance directive for healthcare treatment  --  --  --  --  --            Admitting Physician:  Anju Kaiser MD  PCP: [address] [place] [address]    Discharging Nurse: Isaías Carl [facility] Unit/Room#: P6C-0186/6987-95  Discharging Unit Phone Number: [phone]    Emergency Contact:   Extended Emergency Contact Information  Primary Emergency Contact: [address] [facility] Phone: [phone]  Relation: Child  Secondary Emergency Contact: Elzbieta Saucedo Do [address] Phone: [phone]  Relation: Child    Past Surgical History:  Past Surgical History:   Procedure Laterality Date    CHOLECYSTECTOMY      COLONOSCOPY      COLONOSCOPY N/A 2019    COLONOSCOPY DIAGNOSTIC performed by Eulogio Valdivia DO at [address] [facility], TOTAL ABDOMINAL  's    JACQUELYN/BSO for 'tumor'       Immunization History:   Immunization History   Administered Date(s) Administered    Influenza Virus Vaccine 2014, 2014, 2017    Influenza, High Dose (Fluzone 65 yrs and older) 10/17/2017    Influenza, Quadv, IM, PF (6 mo and older Fluzone, Flulaval, Fluarix, and 3 yrs and older Afluria) 2016    Pneumococcal Conjugate 13-valent (Esuzzao01) 10/17/2017    Pneumococcal Polysaccharide (Ovsdwmwty31) 02/01/2005, 01/23/2014       Active Problems:  Patient Active Problem List   Diagnosis Code    Age-related osteoporosis with current pathol fracture of vertebra ([facility] M80.08XA    Acute right-sided low back pain with sciatica M54.40    History of CVA with residual deficit I69.30    Tobacco abuse Z72.0    Multiple closed fractures of ribs S22.49XA    Essential hypertension, benign I10    Generalized weakness R53.1    Hyperlipidemia E78.5    Fall against object W18.00XA    Hypokalemia E87.6    Severe protein-calorie malnutrition Gregorio Sermon: less than 60% of standard weight) ([facility] E43    Hypertensive urgency I16.0    Lower GI bleed K92.2    Sepsis ([facility] A41.9       Isolation/Infection:   Isolation            No Isolation          Patient Infection Status       Infection Onset Added Last Indicated Last Indicated By Review Planned Expiration Resolved Resolved By    None active    Resolved    COVID-19 Rule Out 01/19/21 01/19/21 01/19/21 COVID-19 (Ordered)   01/19/21 Rule-Out Test Resulted            Nurse Assessment:  Last Vital Signs: /83   Pulse 97   Temp 98.7 °F (37.1 °C) (Oral)   Resp 16   Ht 5' 3\" (1.6 m)   Wt 95 lb 0.3 oz (43.1 kg)   SpO2 96%   BMI 16.83 kg/m²     Last documented pain score (0-10 scale): Pain Level: 0  Last Weight:   Wt Readings from Last 1 Encounters:   01/24/21 95 lb 0.3 oz (43.1 kg)     Mental Status:  oriented and alert    IV Access:  - None    Nursing Mobility/ADLs:  Walking   Dependent  Transfer  Dependent  Bathing  Assisted  Dressing  Assisted  Toileting  Assisted  Feeding  Assisted  Med Admin  Assisted  Med Delivery   whole    Wound Care Documentation and Therapy:        Elimination:  Continence:   · Bowel: No  · Bladder: No  Urinary Catheter: None   Colostomy/Ileostomy/Ileal Conduit: No       Date of Last BM: 1/25/2021    Intake/Output Summary (Last 24 hours) at 1/24/2021 1534  Last data filed at 1/23/2021 1612  Gross per 24 hour   Intake 60 ml   Output --   Net 60 ml I/O last 3 completed shifts: In: 61 [P.O.:60]  Out: -     Safety Concerns: At Risk for Falls and Aspiration Risk    Impairments/Disabilities:      Vision, Hearing and Contractures - L arm    Nutrition Therapy:  Current Nutrition Therapy:   - Oral Diet:  General and Dysphagia 2 mechanically altered    Routes of Feeding: Oral  Liquids: Thin Liquids  Daily Fluid Restriction: no  Last Modified Barium Swallow with Video (Video Swallowing Test): not done    Treatments at the Time of Hospital Discharge:   Respiratory Treatments: N/A  Oxygen Therapy:  is not on home oxygen therapy. Ventilator:    - No ventilator support    Rehab Therapies: Physical Therapy, Occupational Therapy and Speech/Language Therapy  Weight Bearing Status/Restrictions: Non-weight bearing on right leg and left leg  Other Medical Equipment (for information only, NOT a DME order):  wheelchair, bath bench and hospital bed  Other Treatments: N/A    Patient's personal belongings (please select all that are sent with patient):  Glasses    RN SIGNATURE:  Electronically signed by Jose Carlos Mcgee RN on 1/26/21 at 4:57 PM EST    CASE MANAGEMENT/SOCIAL WORK SECTION    Inpatient Status Date: 1/19/2021    Readmission Risk Assessment Score:  Readmission Risk              Risk of Unplanned Readmission:        12           Discharging to Facility/ Agency   · [facility]: [facility]  · Address:  · Phone:[phone]  · RND:[phone]    ·     / signature: Electronically signed by Janey Savage on 1/26/2021 at 4:13 PM      PHYSICIAN SECTION    Prognosis: Fair    Condition at Discharge: Stable    Rehab Potential (if transferring to Rehab):  Fair    Recommended Labs or Other Treatments After Discharge: PT< OT, finish davide    Physician Certification: I certify the above information and transfer of Lacie Ross  is necessary for the continuing treatment of the diagnosis listed and that she requires David cowan

## 2021-01-25 LAB
ALBUMIN SERPL-MCNC: 2.4 G/DL (ref 3.4–5)
ALBUMIN SERPL-MCNC: 2.7 G/DL (ref 3.4–5)
ANION GAP SERPL CALCULATED.3IONS-SCNC: 11 MMOL/L (ref 3–16)
ANION GAP SERPL CALCULATED.3IONS-SCNC: 13 MMOL/L (ref 3–16)
BUN BLDV-MCNC: 11 MG/DL (ref 7–20)
BUN BLDV-MCNC: 11 MG/DL (ref 7–20)
CALCIUM SERPL-MCNC: 8.6 MG/DL (ref 8.3–10.6)
CALCIUM SERPL-MCNC: 8.6 MG/DL (ref 8.3–10.6)
CHLORIDE BLD-SCNC: 93 MMOL/L (ref 99–110)
CHLORIDE BLD-SCNC: 94 MMOL/L (ref 99–110)
CO2: 24 MMOL/L (ref 21–32)
CO2: 25 MMOL/L (ref 21–32)
CREAT SERPL-MCNC: 0.9 MG/DL (ref 0.6–1.2)
CREAT SERPL-MCNC: 0.9 MG/DL (ref 0.6–1.2)
GFR AFRICAN AMERICAN: >60
GFR AFRICAN AMERICAN: >60
GFR NON-AFRICAN AMERICAN: >60
GFR NON-AFRICAN AMERICAN: >60
GLUCOSE BLD-MCNC: 119 MG/DL (ref 70–99)
GLUCOSE BLD-MCNC: 127 MG/DL (ref 70–99)
GLUCOSE BLD-MCNC: 128 MG/DL (ref 70–99)
GLUCOSE BLD-MCNC: 140 MG/DL (ref 70–99)
GLUCOSE BLD-MCNC: 152 MG/DL (ref 70–99)
HCT VFR BLD CALC: 38.1 % (ref 36–48)
HEMOGLOBIN: 12.5 G/DL (ref 12–16)
MAGNESIUM: 2 MG/DL (ref 1.8–2.4)
MCH RBC QN AUTO: 25.9 PG (ref 26–34)
MCHC RBC AUTO-ENTMCNC: 32.8 G/DL (ref 31–36)
MCV RBC AUTO: 79 FL (ref 80–100)
OSMOLALITY URINE: 589 MOSM/KG (ref 390–1070)
PDW BLD-RTO: 17.2 % (ref 12.4–15.4)
PERFORMED ON: ABNORMAL
PHOSPHORUS: 2.4 MG/DL (ref 2.5–4.9)
PHOSPHORUS: 2.5 MG/DL (ref 2.5–4.9)
PLATELET # BLD: 334 K/UL (ref 135–450)
PMV BLD AUTO: 7.1 FL (ref 5–10.5)
POTASSIUM SERPL-SCNC: 3.4 MMOL/L (ref 3.5–5.1)
POTASSIUM SERPL-SCNC: 3.6 MMOL/L (ref 3.5–5.1)
PRO-BNP: 399 PG/ML (ref 0–449)
PROCALCITONIN: 1 NG/ML (ref 0–0.15)
RBC # BLD: 4.83 M/UL (ref 4–5.2)
SARS-COV-2, NAAT: NOT DETECTED
SODIUM BLD-SCNC: 130 MMOL/L (ref 136–145)
SODIUM BLD-SCNC: 130 MMOL/L (ref 136–145)
SODIUM URINE: <20 MMOL/L
VANCOMYCIN RANDOM: 5.9 UG/ML
WBC # BLD: 13.5 K/UL (ref 4–11)

## 2021-01-25 PROCEDURE — 2580000003 HC RX 258: Performed by: INTERNAL MEDICINE

## 2021-01-25 PROCEDURE — 80069 RENAL FUNCTION PANEL: CPT

## 2021-01-25 PROCEDURE — 97129 THER IVNTJ 1ST 15 MIN: CPT

## 2021-01-25 PROCEDURE — 36415 COLL VENOUS BLD VENIPUNCTURE: CPT

## 2021-01-25 PROCEDURE — 85027 COMPLETE CBC AUTOMATED: CPT

## 2021-01-25 PROCEDURE — 2580000003 HC RX 258: Performed by: FAMILY MEDICINE

## 2021-01-25 PROCEDURE — 94761 N-INVAS EAR/PLS OXIMETRY MLT: CPT

## 2021-01-25 PROCEDURE — 83880 ASSAY OF NATRIURETIC PEPTIDE: CPT

## 2021-01-25 PROCEDURE — 83735 ASSAY OF MAGNESIUM: CPT

## 2021-01-25 PROCEDURE — 6370000000 HC RX 637 (ALT 250 FOR IP): Performed by: FAMILY MEDICINE

## 2021-01-25 PROCEDURE — 97530 THERAPEUTIC ACTIVITIES: CPT | Performed by: PHYSICAL THERAPIST

## 2021-01-25 PROCEDURE — 80202 ASSAY OF VANCOMYCIN: CPT

## 2021-01-25 PROCEDURE — 6360000002 HC RX W HCPCS: Performed by: FAMILY MEDICINE

## 2021-01-25 PROCEDURE — 6360000002 HC RX W HCPCS: Performed by: INTERNAL MEDICINE

## 2021-01-25 PROCEDURE — 92526 ORAL FUNCTION THERAPY: CPT

## 2021-01-25 PROCEDURE — 97530 THERAPEUTIC ACTIVITIES: CPT

## 2021-01-25 PROCEDURE — 2060000000 HC ICU INTERMEDIATE R&B

## 2021-01-25 PROCEDURE — 84145 PROCALCITONIN (PCT): CPT

## 2021-01-25 PROCEDURE — 83935 ASSAY OF URINE OSMOLALITY: CPT

## 2021-01-25 PROCEDURE — 99232 SBSQ HOSP IP/OBS MODERATE 35: CPT | Performed by: INTERNAL MEDICINE

## 2021-01-25 PROCEDURE — 84300 ASSAY OF URINE SODIUM: CPT

## 2021-01-25 PROCEDURE — U0002 COVID-19 LAB TEST NON-CDC: HCPCS

## 2021-01-25 RX ORDER — SODIUM CHLORIDE 9 MG/ML
INJECTION, SOLUTION INTRAVENOUS CONTINUOUS
Status: DISCONTINUED | OUTPATIENT
Start: 2021-01-25 | End: 2021-01-26

## 2021-01-25 RX ADMIN — VANCOMYCIN HYDROCHLORIDE 500 MG: 500 INJECTION, POWDER, LYOPHILIZED, FOR SOLUTION INTRAVENOUS at 12:48

## 2021-01-25 RX ADMIN — SODIUM CHLORIDE, PRESERVATIVE FREE 10 ML: 5 INJECTION INTRAVENOUS at 08:15

## 2021-01-25 RX ADMIN — SODIUM CHLORIDE: 9 INJECTION, SOLUTION INTRAVENOUS at 12:47

## 2021-01-25 RX ADMIN — ATORVASTATIN CALCIUM 20 MG: 20 TABLET, FILM COATED ORAL at 19:53

## 2021-01-25 RX ADMIN — HYDRALAZINE HYDROCHLORIDE 25 MG: 25 TABLET, FILM COATED ORAL at 15:03

## 2021-01-25 RX ADMIN — HYDRALAZINE HYDROCHLORIDE 25 MG: 25 TABLET, FILM COATED ORAL at 20:36

## 2021-01-25 RX ADMIN — ASPIRIN 81 MG: 81 TABLET, CHEWABLE ORAL at 08:14

## 2021-01-25 RX ADMIN — SODIUM CHLORIDE: 9 INJECTION, SOLUTION INTRAVENOUS at 22:52

## 2021-01-25 RX ADMIN — NIFEDIPINE 30 MG: 30 TABLET, EXTENDED RELEASE ORAL at 08:14

## 2021-01-25 RX ADMIN — TRAMADOL HYDROCHLORIDE 50 MG: 50 TABLET ORAL at 17:07

## 2021-01-25 RX ADMIN — CEFTRIAXONE 2000 MG: 2 INJECTION, POWDER, FOR SOLUTION INTRAMUSCULAR; INTRAVENOUS at 18:26

## 2021-01-25 RX ADMIN — PANTOPRAZOLE SODIUM 40 MG: 40 TABLET, DELAYED RELEASE ORAL at 08:14

## 2021-01-25 RX ADMIN — ENOXAPARIN SODIUM 30 MG: 30 INJECTION SUBCUTANEOUS at 08:14

## 2021-01-25 RX ADMIN — HYDRALAZINE HYDROCHLORIDE 25 MG: 25 TABLET, FILM COATED ORAL at 05:14

## 2021-01-25 RX ADMIN — POTASSIUM BICARBONATE 40 MEQ: 782 TABLET, EFFERVESCENT ORAL at 08:14

## 2021-01-25 ASSESSMENT — PAIN SCALES - GENERAL
PAINLEVEL_OUTOF10: 0
PAINLEVEL_OUTOF10: 5
PAINLEVEL_OUTOF10: 5

## 2021-01-25 ASSESSMENT — PAIN DESCRIPTION - PROGRESSION: CLINICAL_PROGRESSION: NOT CHANGED

## 2021-01-25 ASSESSMENT — PAIN SCALES - WONG BAKER
WONGBAKER_NUMERICALRESPONSE: 0

## 2021-01-25 ASSESSMENT — PAIN DESCRIPTION - ONSET: ONSET: ON-GOING

## 2021-01-25 ASSESSMENT — PAIN DESCRIPTION - PAIN TYPE: TYPE: ACUTE PAIN

## 2021-01-25 NOTE — PROGRESS NOTES
[facility] called to notify that pts heart rate was in the 140s. RN assessed pt and pt is resting comfortably in the bed. Pt is able to tell me her name and date of birth which is the patients baseline. Pts heart rate did not sustain for more than a few seconds. pts heart rate is now 94. Will continue to monitor.      Electronically signed by Phoebe Valero RN on 1/25/2021 at 12:31 AM

## 2021-01-25 NOTE — CONSULTS
0 [address]                                  CONSULTATION    PATIENT NAME: Mitul Brown                     :        1940  MED REC NO:   [id number]                          ROOM:       5126  ACCOUNT NO:   [de-identified]                           ADMIT DATE: 2021  PROVIDER:     Suri Jordan MD    CONSULT DATE:  2021    REASON FOR CONSULTATION:  Hyponatremia. HISTORY OF PRESENTING ILLNESS:  She is an 79-year-old -American  female with past medical history significant for hypertension, CVA,  GERD, GI bleed, dementia(?), admitted with slurred speech and changed of  mentation. The patient was diagnosed with urinary tract infection,  admitted for further management and treatment. Renal consultation has  been called for hyponatremia. At the time of consultation, the patient  is alert, awake, follows simple commands. She answers simple questions  but is confused and disoriented. Denies any chest pain, shortness of  breath, nausea but admits poor appetite. PAST MEDICAL HISTORY:  1. History of GI bleed. 2.  GERD. 3.  Dementia? 4. CVA. 5.  Failure to thrive. ALLERGIES:  PENICILLIN. MEDICATIONS:  Include Lipitor, Rocephin, Protonix, Procardia,  vancomycin. FAMILY HISTORY:  Not significant for any kidney disease. REVIEW OF SYSTEMS:  Hard of hearing. No vision problem. Had some  nausea but no vomiting or diarrhea. No chest pain, shortness of breath,  and dyspnea on exertion. The patient is slightly confused, disoriented. PHYSICAL EXAMINATION:  GENERAL:  Lying in bed, looks chronically ill. VITAL SIGNS:  Blood pressure is 143/76, pulse 84, temperature 98. 2. HEENT EXAMINATION:  Pupils are reactive to light. CHEST:  Decreased breath sounds both bases. CVS:  S1, S2 audible. Regular rate and rhythm. ABDOMEN:  Soft, nontender. Positive bowel sounds.   EXTREMITIES: Trace edema. CNS:  Nonfocal.  Left-sided weakness. LABORATORY DATA:  Sodium 130, potassium 3.4, chloride 93, bicarb 24, BUN  11, creatinine 0.9. Phosphorus 2.5. Magnesium 2. WBC 13.5. IMPRESSION:  1. History of CVA. 2.  UTI. 3.  Hyponatremia? 4. Hypovolemia versus SIADH. 5.  Urinary tract infection. PLAN:  1. Daily weight. 2.  Strict I's and O's.  3.  Gentle hydration. 4.  Check TSH, uric acid, cortisol level. 5.  Check the serum and urine osmolality. 6.  Check BMP every eight hours. Next one would be noon today. Discharge plan per admitting team.  We will follow.         Viv Pittman MD    D: 01/25/2021 12:50:13       T: 01/25/2021 14:57:14     [email or web address]  Job#: [id number]     Doc#: [id number]    CC:

## 2021-01-25 NOTE — PROGRESS NOTES
 CHOLECYSTECTOMY  2014    COLONOSCOPY  2011    COLONOSCOPY N/A 12/31/2019    COLONOSCOPY DIAGNOSTIC performed by Madras DO Larisa at [address], TOTAL ABDOMINAL  1970's    JACQUELYN/BSO for 'tumor'       Current Medications:    Outpatient Medications Marked as Taking for the 1/19/21 encounter [facility] Encounter)   Medication Sig Dispense Refill    docusate sodium (COLACE) 100 MG capsule Take 100 mg by mouth daily      calcium-vitamin D (OSCAL-500) 500-200 MG-UNIT per tablet Take 1 tablet by mouth 2 times daily      lisinopril (PRINIVIL;ZESTRIL) 20 MG tablet Take 20 mg by mouth daily      traZODone (DESYREL) 50 MG tablet Take 50 mg by mouth nightly as needed for Sleep      pantoprazole (PROTONIX) 40 MG tablet Take 40 mg by mouth daily      oxybutynin (DITROPAN) 5 MG tablet Take 5 mg by mouth 2 times daily as needed (bladder spasms)      simvastatin (ZOCOR) 40 MG tablet Take 40 mg by mouth nightly      traMADol (ULTRAM) 50 MG tablet Take 50 mg by mouth every 6 hours as needed.       NIFEdipine (ADALAT CC) 30 MG extended release tablet Take 1 tablet by mouth daily 30 tablet 0    hydrochlorothiazide (HYDRODIURIL) 25 MG tablet Take 1 tablet by mouth daily 30 tablet 0       Allergies:  Pcn [penicillins]    Immunizations :   Immunization History   Administered Date(s) Administered    Influenza Virus Vaccine 01/24/2014, 12/05/2014, 02/09/2017    Influenza, High Dose (Fluzone 65 yrs and older) 10/17/2017    Influenza, Quadv, IM, PF (6 mo and older Fluzone, Flulaval, Fluarix, and 3 yrs and older Afluria) 11/20/2016    Pneumococcal Conjugate 13-valent ([id number]) 10/17/2017    Pneumococcal Polysaccharide ([id number]) 02/01/2005, 01/23/2014       Social History:    Social History     Tobacco Use    Smoking status: Current Every Day Smoker     Packs/day: 0.25     Years: 50.00     Pack years: 12.50     Types: Cigarettes    Smokeless tobacco: Never Used Substance Use Topics    Alcohol use: No    Drug use: No     Social History     Tobacco Use   Smoking Status Current Every Day Smoker    Packs/day: 0.25    Years: 50.00    Pack years: 12.50    Types: Cigarettes   Smokeless Tobacco Never Used      Family History   Problem Relation Age of Onset    High Blood Pressure Mother     High Blood Pressure Father     High Blood Pressure Daughter     Cancer Son           REVIEW OF SYSTEMS:     Constitutional:   fevers + , chills, night sweats  Eyes:  negative for blurred vision, eye discharge, visual disturbance   HEENT:  negative for hearing loss, ear drainage,nasal congestion  Respiratory:  negative for cough, shortness of breath or hemoptysis   Cardiovascular:  negative for chest pain, palpitations, syncope  Gastrointestinal:  negative for nausea, vomiting, diarrhea, constipation, abdominal pain  Genitourinary:  negative for frequency, dysuria, urinary incontinence, hematuria  Hematologic/Lymphatic:  negative for easy bruising, bleeding and lymphadenopathy  Allergic/Immunologic:  negative for recurrent infections, angioedema, anaphylaxis   Endocrine:  negative for weight changes, polyuria, polydipsia and polyphagia  Musculoskeletal:  negative for joint  pain, swelling, decreased range of motion  Integumentary: No rashes, skin lesions  Neurological:  negative for headaches, slurred speech, unilateral weakness  Psychiatric: negative for hallucinations,confusion,agitation.                 PHYSICAL EXAM:      Vitals:    BP (!) 143/76   Pulse 84   Temp 98.2 °F (36.8 °C) (Oral)   Resp 16   Ht 5' 3\" (1.6 m)   Wt 95 lb 10.9 oz (43.4 kg)   SpO2 93%   BMI 16.95 kg/m²     General Appearance: awake and in no acute distress, + pallor, no icterus   Skin: warm and dry, no rash or erythema  Head: normocephalic and atraumatic  Eyes: pupils equal, round, and reactive to light, conjunctivae normal  ENT: tympanic membrane, external ear and ear canal normal bilaterally, nose without deformity, nasal mucosa and turbinates normal without polyps  Neck: supple and non-tender without mass, no thyromegaly  no cervical lymphadenopathy  Pulmonary/Chest: clear to auscultation bilaterally- no wheezes, rales or rhonchi, normal air movement, no respiratory distress  Cardiovascular: normal rate, regular rhythm, normal S1 and S2, no murmurs, rubs, clicks, or gallops, no carotid bruits  Abdomen: soft, non-tender, non-distended, normal bowel sounds, no masses or organomegaly  Extremities: no cyanosis, clubbing or edema  Musculoskeletal: normal range of motion, no joint swelling, deformity or tenderness  Integumentary: No rashes, no abnormal skin lesions, no petechiae  Neurologic:  Left side weakness from previous CVA        Lines: iv     Data Review:    CBC:   Lab Results   Component Value Date    WBC 13.5 (H) 01/25/2021    HGB 12.5 01/25/2021    HCT 38.1 01/25/2021    MCV 79.0 (L) 01/25/2021     01/25/2021     RENAL:   Lab Results   Component Value Date    CREATININE 0.9 01/25/2021    BUN 11 01/25/2021     (L) 01/25/2021    K 3.4 (L) 01/25/2021    CL 93 (L) 01/25/2021    CO2 24 01/25/2021     SED RATE: No results found for: SEDRATE  CK: No results found for: CKTOTAL  CRP: No results found for: CRP  Hepatic Function Panel:   Lab Results   Component Value Date    ALKPHOS 110 01/19/2021    ALT 11 01/19/2021    AST 33 01/19/2021    PROT 9.0 01/19/2021    PROT 8.2 10/04/2011    BILITOT 0.7 01/19/2021    BILIDIR <0.2 11/20/2016    IBILI see below 11/20/2016    LABALBU 2.7 01/25/2021     UA:  Lab Results   Component Value Date    COLORU YELLOW 01/19/2021    CLARITYU TURBID 01/19/2021    GLUCOSEU 100 01/19/2021    GLUCOSEU NEGATIVE 07/10/2010    BILIRUBINUR Negative 01/19/2021    BILIRUBINUR NEGATIVE 07/10/2010    KETUA 15 01/19/2021    SPECGRAV 1.021 01/19/2021    BLOODU MODERATE 01/19/2021    PHUR 5.5 01/19/2021    PROTEINU >=300 01/19/2021    UROBILINOGEN 1.0 01/19/2021    NITRU Negative 01/19/2021    LEUKOCYTESUR MODERATE 01/19/2021    LABMICR YES 01/19/2021    URINETYPE NotGiven 01/19/2021      Urine Microscopic:   Lab Results   Component Value Date    BACTERIA 1+ 09/29/2018    COMU see below 01/19/2021    HYALCAST 0-2 01/19/2021    WBCUA 13 01/19/2021    RBCUA 0-2 01/19/2021    EPIU 19 01/19/2021     Urine Reflex to Culture:   Lab Results   Component Value Date    URRFLXCULT Yes 01/19/2021         MICRO: cultures reviewed and updated by me   Blood Culture:   Lab Results   Component Value Date    BC No Growth after 4 days of incubation. 01/19/2021    BLOODCULT2 No Growth after 4 days of incubation. 01/19/2021       Respiratory Culture:  No results found for: Kenn Carl  AFB:No results found for: [facility]  Viral Culture:  Lab Results   Component Value Date    COVID19 Not Detected 01/19/2021     Urine Culture: No results for input(s): Dmitriy Yao in the last 72 hours. IMAGING:    CT HEAD WO CONTRAST   Final Result   No definite evidence of acute intracranial abnormality on a study limited as   described above. Critical results were called by Dr. Olga Lidia San. Sabra Mcclain MD to [facility]   on 1/21/2021 at 12:35. MRI BRAIN WO CONTRAST   Final Result   No evidence for restricted diffusion. Diffuse volume loss chronic white   matter changes. Incidental note is made of a intrasellar mass suspicious for macro adenoma   measuring 1.4 x 1.4 x 1.5 cm         XR CHEST PORTABLE   Final Result   No convincing evidence for acute cardiopulmonary pathology. Findings suggestive of COPD. CTA HEAD NECK W CONTRAST   Final Result   1. 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 This   is presumed chronic in nature. 2. Mild-to-moderate stenosis at the origin of the right vertebral artery. 3. Moderate focal stenosis of the left subclavian artery.    4. No hemodynamically significant stenosis or branch occlusion in the   cervical carotid arteries. 5. Moderate focal stenosis of M2 branch of left middle cerebral artery. Mild   stenosis within left M1 segment. 6. 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 CT HEAD WO CONTRAST   Final Result   No acute intracranial abnormality. Mild prominence of the pituitary gland is not significantly changed dating   back to 11/1/2018. Underlying adenoma would be difficult to exclude. If   clinically indicated this could be further evaluated with MRI. Critical results were called by Dr. Yarbrough [place] to [address] [place] [address] on   1/19/2021 at 08:32.                All the pertinent images and reports for the current Hospitalization were reviewed by me     Scheduled Meds:   vancomycin 500 mg IVPB in 100 mL NS addavial  500 mg Intravenous Once    vancomycin (VANCOCIN) intermittent dosing (placeholder)   Other RX Placeholder    cefTRIAXone (ROCEPHIN) IV  2,000 mg Intravenous Q24H    potassium bicarb-citric acid  40 mEq Oral Daily    hydrALAZINE  25 mg Oral 3 times per day    aspirin  81 mg Oral Daily    sodium chloride flush  10 mL Intravenous 2 times per day    enoxaparin  30 mg Subcutaneous Daily    NIFEdipine  30 mg Oral Daily    pantoprazole  40 mg Oral Daily    atorvastatin  20 mg Oral Nightly       Continuous Infusions:   dextrose         PRN Meds:  traMADol, iopamidol, melatonin, sodium chloride flush, acetaminophen **OR** acetaminophen, glucose, dextrose, glucagon (rDNA), dextrose, labetalol      Assessment:     Patient Active Problem List   Diagnosis    Age-related osteoporosis with current pathol fracture of vertebra (HCC)    Acute right-sided low back pain with sciatica    History of CVA with residual deficit    Tobacco abuse    Multiple closed fractures of ribs    Essential hypertension, benign    Generalized weakness    Hyperlipidemia    Fall against object    Hypokalemia    Severe protein-calorie malnutrition Maykel Fenton: less than 60% of standard weight) ([facility] [address]    Hypertensive urgency    Lower GI bleed    Sepsis ([facility] [address]     Sepsis on admission  Severe hypoglycemia before admission she is not Diabetic   Change in mental status  Metabolic encephalopathy   History of CVA with left-sided weakness  MRI brain negative  Chronic smoking  History of GI bleed  Urinary tract infection  Fevers resolving slowly   Lactic acidosis on admission  WBC elevation on admission  HbA1c  5.3   DANIEL on admit      Change in mental status on admission secondary to severe hypoglycemia sepsis and fevers all seems to be improved. Blood cultures remain negative urine culture positive. WBC still remain elevated. Given prior history of stroke there is some concern if she had aspiration event. Agree with IV antibiotic coverage.     Labs, Microbiology, Radiology and all the pertinent results from current hospitalization and  care every where were reviewed  by me as a part of the evaluation   Plan:   1. Cont IV Vancomycin   2. Cont IV Ceftriaxone x 2 gm x q 24 HRS  3. Aerococcus can be a true pathogen in elderly patients and given positive cx agree with treatment  4. Its usually sensitive to Cephalosporins and Ceftriaxone will cover UTI and possible aspiration  5. Trend WBC and Procal   6. Will choose oral abx when ready   7. She was volume depleted, DANIEL and hypoglycemia given prior CVA wonder if she had seizure episode given high lactic acidosis  but no witnessed events since admit      Discussed with patient/Family and Nursing staff     Thanks for allowing me to participate in your patient's care and please call me with any questions or concerns.     Adam Patrick MD  Infectious Disease  [facility]) Physician  Phone: [phone]   Fax : [phone]

## 2021-01-25 NOTE — PROGRESS NOTES
Department of Internal Medicine  Nephrology Progress Note    SUBJECTIVE:  We are following this patient for hyponatremia . Patient progress reviewed. REVIEW OF SYSTEMS:  More awake , but still confused / disoriented . No family present . Physical Exam:    VITALS:  BP (!) 143/76   Pulse 84   Temp 98.2 °F (36.8 °C) (Oral)   Resp 16   Ht 5' 3\" (1.6 m)   Wt 95 lb 10.9 oz (43.4 kg)   SpO2 93%   BMI 16.95 kg/m²   24HR INTAKE/OUTPUT:      Intake/Output Summary (Last 24 hours) at 1/25/2021 1242  Last data filed at 1/25/2021 1023  Gross per 24 hour   Intake 350 ml   Output 60 ml   Net 290 ml       Constitutional:  Resting   Respiratory:  CTA  Gastrointestinal:  No  tenderness.   Normal Bowel Sounds  Cardiovascular:  S1, S2 RRR   Edema:  Lower Extremity has no edema    DATA:    CBC:  Lab Results   Component Value Date    WBC 13.5 01/25/2021    RBC 4.83 01/25/2021    HGB 12.5 01/25/2021    HCT 38.1 01/25/2021    MCV 79.0 01/25/2021    MCH 25.9 01/25/2021    MCHC 32.8 01/25/2021    RDW 17.2 01/25/2021     01/25/2021    MPV 7.1 01/25/2021     CMP:  Lab Results   Component Value Date     01/25/2021    K 3.4 01/25/2021    K 4.0 01/19/2021    CL 93 01/25/2021    CO2 24 01/25/2021    BUN 11 01/25/2021    CREATININE 0.9 01/25/2021    GFRAA >60 01/25/2021    GFRAA >60 05/16/2013    AGRATIO 0.8 01/19/2021    LABGLOM >60 01/25/2021    GLUCOSE 152 01/25/2021    PROT 9.0 01/19/2021    PROT 8.2 10/04/2011    CALCIUM 8.6 01/25/2021    BILITOT 0.7 01/19/2021    ALKPHOS 110 01/19/2021    AST 33 01/19/2021    ALT 11 01/19/2021      Hepatic Function Panel:   Lab Results   Component Value Date    ALKPHOS 110 01/19/2021    ALT 11 01/19/2021    AST 33 01/19/2021    PROT 9.0 01/19/2021    PROT 8.2 10/04/2011    BILITOT 0.7 01/19/2021    BILIDIR <0.2 11/20/2016    IBILI see below 11/20/2016      Phosphorus:   Lab Results   Component Value Date    PHOS 2.5 01/25/2021       ASSESSMENT:  Active Problems:    Sepsis [facility])  Resolved Problems:    * No resolved hospital problems. *      PLAN:  1. Hyponatremia Etiology hypo volumia vs SIADH? Meds? ( off ACE-I/HCTZ)   2. Check  TSH,Uirc acid ,Ser and Ur osm. Add gentle hydration  For 1 litre . 3. Hypokalemia will supp . 4. UTI on abx   5. H/o CVA plan per admitting team   6.  Encephalopathy resolving     Glen Hu MD. Cisco Pereira

## 2021-01-25 NOTE — PROGRESS NOTES
Hospitalist Progress Note      PCP: [facility]    Date of Admission: 1/19/2021    Chief Complaint:   Chief Complaint   Patient presents with    Altered Mental Status     lkn 8pm per son, at baseline pt A&Ox3, able to hold conversation     Hypoglycemia     BS read low per squad, given 15g oral glucose  on repeat per squad      Hospital Course: Olive Hammond y.o. female with PMH CVA, HTN, GERD, GIB 2/2 PUD, tobacco abuse who presented to [place] [address] with worsening left sided weakness, confusion, slurred speech and decreased responsiveness. She was treated by EMS for hypoglycemia, and was admitted for sepsis 2/2 UTI. Urine culture growing Aerococcus, patient is allergic to penicillin so antibiotics choice complicated. Sent outside lab for susceptibilities, pending. Symptomatic improvement on Rocephin, refused labs for a few days and now with increased white count. Code stroke called 1/21, negative work-up. Subjective: Patient seen and examined. Patient alert and oriented today. States that she would like to go home but will need to discuss further with patient's daughter. Therapy is still recommending 3-5 sessions per week.     Medications:  Reviewed    Infusion Medications    sodium chloride 40 mL/hr at 01/25/21 1247    dextrose       Scheduled Medications    vancomycin 500 mg IVPB in 100 mL NS addavial  500 mg Intravenous Once    vancomycin (VANCOCIN) intermittent dosing (placeholder)   Other RX Placeholder    cefTRIAXone (ROCEPHIN) IV  2,000 mg Intravenous Q24H    potassium bicarb-citric acid  40 mEq Oral Daily    hydrALAZINE  25 mg Oral 3 times per day    aspirin  81 mg Oral Daily    sodium chloride flush  10 mL Intravenous 2 times per day    enoxaparin  30 mg Subcutaneous Daily    NIFEdipine  30 mg Oral Daily    pantoprazole  40 mg Oral Daily    atorvastatin  20 mg Oral Nightly     PRN Meds: traMADol, iopamidol, melatonin, sodium chloride flush, acetaminophen **OR** acetaminophen, glucose, dextrose, glucagon (rDNA), dextrose, labetalol      Intake/Output Summary (Last 24 hours) at 1/25/2021 1346  Last data filed at 1/25/2021 1023  Gross per 24 hour   Intake 350 ml   Output 60 ml   Net 290 ml       Physical Exam Performed:    BP (!) 143/76   Pulse 84   Temp 98.2 °F (36.8 °C) (Oral)   Resp 16   Ht 5' 3\" (1.6 m)   Wt 95 lb 10.9 oz (43.4 kg)   SpO2 93%   BMI 16.95 kg/m²     General appearance: No apparent distress, appears stated age and cooperative. HEENT: Pupils equal, round, and reactive to light. Conjunctivae/corneas clear. Neck: Supple, with full range of motion. Trachea midline. Respiratory:  Normal respiratory effort. Clear to auscultation, bilaterally without Rales/Wheezes/Rhonchi. Cardiovascular: Regular rate and rhythm with normal S1/S2 without murmurs, rubs or gallops. Abdomen: Soft, non-tender, non-distended with normal bowel sounds. Musculoskeletal: No clubbing, cyanosis or edema bilaterally. Full range of motion without deformity. Skin: Skin color, texture, turgor normal.  No rashes or lesions. Neurologic:  Neurovascularly intact without any focal sensory/motor deficits. Cranial nerves: II-XII intact, grossly non-focal.  Psychiatric: Alert and oriented, thought content appropriate, normal insight  Capillary Refill: Brisk,< 3 seconds   Peripheral Pulses: +2 palpable, equal bilaterally       Labs:   Recent Labs     01/24/21  0806 01/25/21  0503   WBC 14.9* 13.5*   HGB 14.0 12.5   HCT 46.4 38.1    334     Recent Labs     01/24/21  1610 01/25/21  0503   * 130*   K 4.5 3.4*   CL 93* 93*   CO2 19* 24   BUN 14 11   CREATININE 1.1 0.9   CALCIUM 8.5 8.6   PHOS 1.9* 2.5     No results for input(s): AST, ALT, BILIDIR, BILITOT, ALKPHOS in the last 72 hours. No results for input(s): INR in the last 72 hours. No results for input(s): Maryl Peek in the last 72 hours.     Urinalysis:      Lab Results   Component Value Date NITRU Negative 01/19/2021    WBCUA 13 01/19/2021    BACTERIA 1+ 09/29/2018    RBCUA 0-2 01/19/2021    BLOODU MODERATE 01/19/2021    SPECGRAV 1.021 01/19/2021    GLUCOSEU 100 01/19/2021    GLUCOSEU NEGATIVE 07/10/2010       Radiology:  CT HEAD WO CONTRAST   Final Result   No definite evidence of acute intracranial abnormality on a study limited as   described above. Critical results were called by Dr. Austin Campbell. Virgilio Myrick MD to [facility] N[facility]   on 1/21/2021 at 12:35. MRI BRAIN WO CONTRAST   Final Result   No evidence for restricted diffusion. Diffuse volume loss chronic white   matter changes. Incidental note is made of a intrasellar mass suspicious for macro adenoma   measuring 1.4 x 1.4 x 1.5 cm         XR CHEST PORTABLE   Final Result   No convincing evidence for acute cardiopulmonary pathology. Findings suggestive of COPD. CTA HEAD NECK W CONTRAST   Final Result   1. 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 This   is presumed chronic in nature. 2. Mild-to-moderate stenosis at the origin of the right vertebral artery. 3. Moderate focal stenosis of the left subclavian artery. 4. No hemodynamically significant stenosis or branch occlusion in the   cervical carotid arteries. 5. Moderate focal stenosis of M2 branch of left middle cerebral artery. Mild   stenosis within left M1 segment. 6. 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 CT HEAD WO CONTRAST   Final Result   No acute intracranial abnormality. Mild prominence of the pituitary gland is not significantly changed dating   back to 11/1/2018. Underlying adenoma would be difficult to exclude. If   clinically indicated this could be further evaluated with MRI. Critical results were called by Dr. Sunil Betts to [facility] on   1/19/2021 at 08:32. Assessment/Plan:    Active Hospital Problems    Diagnosis    Sepsis ([facility] [address] [A41.9]     Acute metabolic encephalopathy, improved  Due to hypoglycemia and Sepsis. CT head negative for acute abnormality  - treat accordingly and monitor   - Speech thearpy - dysphagia diet  - MRI brain - no acute CVA  - neuro checks  - pt/ot; 3-5 sessions per week     Stroke alert 1/21- episode of alertered awareness, aphasia, weakness  - CT head - no acute changes  - concern for possible TIAs  - neurology c/s - recommends resuming antiplatelet, will start asa 81 mg and monitor closely given hx GIB     UTI  Urine culture - aerococcus species, pending speciation as patient is allergic to penicillin  - on rocephin-- clinical improvement though WBC uptrending. Spoke to lab 1/23 and 1/24, culture sent to outside lab and sensitivities still pending  - will switch to vanc pending sensitivities with increased white blood cell count  - ID consult-- patient refused labs for a few days, WBC now increased though clinically improved     Sepsis POA - improving  Afebrile, leukocytosis resolved. 102 on admission, WBC 14  - bl cx ngtd  - abx as above     Lactic acidosis   - resolved     DANIEL   - improved,  - hold ace-I, hctz  - monitor     Hypokalemia  - replace, monitor   - refusing labs     Hypoglycemia - resolved  - d/c IVF  - protocol prn, monitor poc glucose levels     Incidental intrasellar mass suspicious for macro adenoma  - OP w/u      HTN - elevated  - c/w nifedipine, hydralazine  - holding ace-I, hctz d/t daniel     Prolonged QTc - repeat EKG - improved      HLD - statin     Hx cva - left sided weakness    DVT Prophylaxis: Lovenox  Diet: DIET GENERAL; Dysphagia Soft and Bite-Sized  Code Status: Full Code    PT/OT Eval Status: ordered, refusing    Dispo -PT recommending SNF, patient initially refused but daughter feels strongly that she needs to go to a facility and is trying to convince her. Discussed with social work.   Discharge

## 2021-01-25 NOTE — PROGRESS NOTES
exclude.  If   clinically indicated this could be further evaluated with MRI. Critical results were called by Dr. Lela Matson to [facility] on   1/19/2021 at 08:32.      · 1/19/2021 CTA Head  Impression   1. 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  This   is presumed chronic in nature. 2. Mild-to-moderate stenosis at the origin of the right vertebral artery. 3. Moderate focal stenosis of the left subclavian artery. 4. No hemodynamically significant stenosis or branch occlusion in the   cervical carotid arteries. 5. Moderate focal stenosis of M2 branch of left middle cerebral artery.  Mild   stenosis within left M1 segment. 6. 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      · 1/19/2021 Chest XR      Impression   No convincing evidence for acute cardiopulmonary pathology. Findings suggestive of COPD.      · 1/21 Code Stroke late AM: acute episode of aphasia, left arm weakness, and not following commands  Preliminary CT Head 1/21:   No definite evidence of acute intracranial abnormality on a study limited as   described above.      · 1/22/2021 CSE re-ordered d/t patient requesting upgrade from puree    · 1/24/2021 CSE re-ordered: order notes: Pt is not compliant with staff.  Pt won't eat pureed food. Obdulio Teran states pt does not wear her dentures but still chews regular food at home    Subjective:     Current diet  Puree  Thin liquids    Comments regarding tolerating Current Diet:   Pt requesting diet upgrade  Daughter reports baseline dysphagia status: edentulous but able to tolerate regular solids; does endorse some difficulty with nuts    Objective:     Pain   Patient Currently in Pain: Denies    Cognitive/Behavior   Behavior/Cognition: Alert, Uncooperative, Confused, Requires encouragement    Presentations   Consistencies Administered:  Dysphagia Soft/Bite-sized, Thin - straw    Positioning    upright in bed; tends to roll to L side    PO Trials:  · Thin Liquids straw: decreased bolus control, decreased AP transit, delayed swallow initiation, decreased laryngeal elevation. No immediate overt s/s of aspiration. · Puree: declined. · Minced food: declined  · Soft food: only agreeable to initial small bite chocolate chip cookie, but declined additional trials: prolonged but adequate mastication, decreased bolus control, prolonged lingual manipulation, decreased AP transit, delayed swallow initiation, decreased laryngeal elevation    Dysphagia Tx:   · Pt alert with need for max encouragement to accept PO trials for re-assessment despite reminders for request to upgrade from puree; makes basic wants/needs known; follows simple commands. · PO as described above. No immediate overt s/s with puree and thin liquids via straw; prolonged but adequate bolus formation and movement with textured solids but assessment is limited d/t poor participation with PO trials. Daughter and RN report patient tolerating chicken nuggets, grapes, and apples brought from home over the weekend; suspect appropriate for upgrade, but recommend continued assessment/re-assessment as/if tolerated with patient's preferred foods (not available at the time of this re-assessment). Goals: The patient will tolerate recommended diet without observed clinical signs of aspiration ongoing; rec consideration for upgrade to regular as tolerated based on staff/family report of tolerance  The patient/caregiver will demonstrate understanding of compensatory strategies for improved swallowing safety.  Ongoing; pt is cue dependent, inconsistently follows dx  Pt will tolerate therapy trials of dysphagia minced and moist food consistencies 10/10 without overt clinical s/s of aspiration and without oral pocketing ongoing; behavior is a barrier    Assessment:   Impressions:   Oral > pharyngeal Dysphagia characterized by prolonged, but adequate mastication; variable reduced rate of A-P oral transit and delayed initiation of swallow with potential reduced laryngeal elevation. · No immediate overt s/s of aspiration. · Prolonged but adequate bolus formation and movement with textured solids but assessment is limited d/t poor participation with PO trials. Daughter and RN report patient tolerating chicken nuggets, grapes, and apples brought from home over the weekend; suspect appropriate for upgrade, but recommend continued assessment/re-assessment as/if tolerated with patient's preferred foods (not available at the time of this re-assessment). · ST will continue to follow for tolerance. · Pt with documented history of GERD. GERD precautions recommended    Diet Recommendations:  Regular diet texture as tolerated - downgrade to soft if difficulty noted by staff  Thin liquids  Recommended Form of Meds: Crushed in puree as able  Compensatory Swallowing Strategies: Upright as possible for all oral intake, Swallow 2 times per bite/sip, Remain upright for 30-45 minutes after meals(oral care)    Education:  Consulted and agree with results and recommendations: Patient, RN  Patient Education Response: No evidence of learning    Prognosis:   Good for dysphagia    Plan:     Continue Dysphagia Therapy: yes  Interventions: Therapeutic Interventions: Diet tolerance monitoring, Patient/Family education, Therapeutic PO trials with SLP(with diet upgrade when/if warranted)  Duration/Frequency of therapy while on unit: Duration/Frequency of Treatment  Duration/Frequency of Treatment: 3-5 times a week while on acute medical floor;additional recommendations to be determined at d/c  Discharge Instructions:   Anticipate potential for further skilled Speech Therapy for Dysphagia at discharge    This note serves as a D/C Summary in the event that this patient is discharged prior to the next therapy session.     Coded treatment time:10  Total treatment

## 2021-01-25 NOTE — PLAN OF CARE
Problem: Falls - Risk of:  Goal: Will remain free from falls  Description: Will remain free from falls  1/24/2021 2306 by eDl Marie RN  Outcome: Ongoing     Problem: Skin Integrity:  Goal: Will show no infection signs and symptoms  Description: Will show no infection signs and symptoms  1/24/2021 2306 by Del Marie RN  Outcome: Ongoing     Problem: Skin Integrity:  Goal: Absence of new skin breakdown  Description: Absence of new skin breakdown  1/24/2021 2306 by Del Marie RN  Outcome: Ongoing     Problem: Daily Care:  Goal: Daily care needs are met  Description: Daily care needs are met  1/24/2021 2306 by Del Marie RN  Outcome: Ongoing     Problem: Discharge Planning:  Goal: Patients continuum of care needs are met  Description: Patients continuum of care needs are met  1/24/2021 2306 by Del Marie RN  Outcome: Ongoing     Problem: Injury - Risk of, Physical Injury:  Goal: Will remain free from falls  Description: Will remain free from falls  1/24/2021 2306 by Del Marie RN  Outcome: Ongoing

## 2021-01-25 NOTE — PROGRESS NOTES
Clinical Pharmacy Note  Vancomycin Consult    Krishna Barry is a 79 y.o. female ordered Vancomycin for UTI; consult received from Dr. Pedro Washington to manage therapy. Patient Active Problem List   Diagnosis    Age-related osteoporosis with current pathol fracture of vertebra ([facility] [address]    Acute right-sided low back pain with sciatica    History of CVA with residual deficit    Tobacco abuse    Multiple closed fractures of ribs    Essential hypertension, benign    Generalized weakness    Hyperlipidemia    Fall against object    Hypokalemia    Severe protein-calorie malnutrition Tonna Larry: less than 60% of standard weight) ([facility])    Hypertensive urgency    Lower GI bleed    Sepsis ([facility] [address]       Allergies:  Pcn [penicillins]     Temp max:  Temp (24hrs), Av.1 °F (37.3 °C), Min:98.5 °F (36.9 °C), Max:100.1 °F (37.8 °C)      Recent Labs     21  0806 21  0503   WBC 14.9* 13.5*       Recent Labs     21  1610 21  0503   BUN 14 11   CREATININE 1.1 0.9         Intake/Output Summary (Last 24 hours) at 2021 0941  Last data filed at 2021 0516  Gross per 24 hour   Intake 300 ml   Output --   Net 300 ml       Culture Results:  Aerococcus species    Ht Readings from Last 1 Encounters:   21 5' 3\" (1.6 m)        Wt Readings from Last 1 Encounters:   21 95 lb 10.9 oz (43.4 kg)         CrCl cannot be calculated (Unknown ideal weight. ). Assessment/Plan:    Vanco day # 2  Vanco 750 mg x 1 given yesterday  Random vanco this am (17 hour level) = 5.9 mg/L  Will give vanco 500 mg x 1 this am and check an 18 hour level (random at 0600 tomorrow)    Thank you for the consult.      Kelsey Jones, SriD.  2021  9:42 AM

## 2021-01-25 NOTE — PLAN OF CARE
Problem: Falls - Risk of:  Goal: Will remain free from falls  Description: Will remain free from falls  1/25/2021 1114 by Kristy Elder RN  Outcome: Ongoing  1/24/2021 2306 by Lor Almaguer RN  Outcome: Ongoing  Goal: Absence of physical injury  Description: Absence of physical injury  1/25/2021 1114 by Kristy Elder RN  Outcome: Ongoing  1/24/2021 2306 by Lor Almaguer RN  Outcome: Ongoing     Problem: Skin Integrity:  Goal: Will show no infection signs and symptoms  Description: Will show no infection signs and symptoms  1/25/2021 1114 by Kristy Elder RN  Outcome: Ongoing  1/24/2021 2306 by Lor Almaguer RN  Outcome: Ongoing  Goal: Absence of new skin breakdown  Description: Absence of new skin breakdown  1/25/2021 1114 by Kristy Elder RN  Outcome: Ongoing  1/24/2021 2306 by Lor Almaguer RN  Outcome: Ongoing     Problem: Infection:  Goal: Will remain free from infection  Description: Will remain free from infection  1/25/2021 1114 by Kristy Elder RN  Outcome: Ongoing  1/24/2021 2306 by Lor Almaguer RN  Outcome: Ongoing

## 2021-01-25 NOTE — PROGRESS NOTES
Physical Therapy    Went in to see the pt for PT/OT co-tx. Spent 20 min educating the pt on the importance of mobility and getting OOB. She stated she was too tired and would get OOB when she was ready. When attempts were made to assist her to move her LEs she snapped that she would get OOB when she was ready. Will attempt back tomorrow as schedule permits. Will D/C PT if she refuses tomorrow. 14:00-14:20=20 min  Please refer to last written note if pt is D/C prior to another PT session.     Electronically signed by Fili Dunham PT on 1/25/21 at 2:38 PM EST

## 2021-01-25 NOTE — PROGRESS NOTES
Occupational Therapy    Attempted OT/PT cotx. Pt supine in bed. Attempted multiple times to have pt sit EOB. Pt repeatedly refusing any OOB activity. Pt very distracted with other subjects and when redirecting back to OOB pt refusing to move. Pt requesting to have blankets due to being cold. Spent from [phone] in the room attempting therapy.     Anastasia Peterson, [address]

## 2021-01-26 VITALS
BODY MASS INDEX: 17.77 KG/M2 | HEART RATE: 93 BPM | DIASTOLIC BLOOD PRESSURE: 79 MMHG | WEIGHT: 100.31 LBS | TEMPERATURE: 98.2 F | SYSTOLIC BLOOD PRESSURE: 133 MMHG | OXYGEN SATURATION: 95 % | RESPIRATION RATE: 18 BRPM | HEIGHT: 63 IN

## 2021-01-26 LAB
ALBUMIN SERPL-MCNC: 2.5 G/DL (ref 3.4–5)
ALBUMIN SERPL-MCNC: 2.7 G/DL (ref 3.4–5)
ANION GAP SERPL CALCULATED.3IONS-SCNC: 12 MMOL/L (ref 3–16)
ANION GAP SERPL CALCULATED.3IONS-SCNC: 14 MMOL/L (ref 3–16)
BILIRUBIN URINE: NEGATIVE
BLOOD, URINE: NEGATIVE
BUN BLDV-MCNC: 9 MG/DL (ref 7–20)
BUN BLDV-MCNC: 9 MG/DL (ref 7–20)
CALCIUM SERPL-MCNC: 8.4 MG/DL (ref 8.3–10.6)
CALCIUM SERPL-MCNC: 8.5 MG/DL (ref 8.3–10.6)
CHLORIDE BLD-SCNC: 95 MMOL/L (ref 99–110)
CHLORIDE BLD-SCNC: 95 MMOL/L (ref 99–110)
CHLORIDE URINE RANDOM: <20 MMOL/L
CLARITY: CLEAR
CO2: 23 MMOL/L (ref 21–32)
CO2: 25 MMOL/L (ref 21–32)
COLOR: YELLOW
CORTISOL - AM: 16 UG/DL (ref 4.3–22.4)
CREAT SERPL-MCNC: 0.8 MG/DL (ref 0.6–1.2)
CREAT SERPL-MCNC: 0.8 MG/DL (ref 0.6–1.2)
EPITHELIAL CELLS, UA: 12 /HPF (ref 0–5)
GFR AFRICAN AMERICAN: >60
GFR AFRICAN AMERICAN: >60
GFR NON-AFRICAN AMERICAN: >60
GFR NON-AFRICAN AMERICAN: >60
GLUCOSE BLD-MCNC: 92 MG/DL (ref 70–99)
GLUCOSE BLD-MCNC: 97 MG/DL (ref 70–99)
GLUCOSE URINE: NEGATIVE MG/DL
HCT VFR BLD CALC: 36.2 % (ref 36–48)
HEMOGLOBIN: 11.8 G/DL (ref 12–16)
HYALINE CASTS: 1 /LPF (ref 0–8)
KETONES, URINE: NEGATIVE MG/DL
LEUKOCYTE ESTERASE, URINE: NEGATIVE
Lab: NORMAL
MAGNESIUM: 1.9 MG/DL (ref 1.8–2.4)
MCH RBC QN AUTO: 25.5 PG (ref 26–34)
MCHC RBC AUTO-ENTMCNC: 32.6 G/DL (ref 31–36)
MCV RBC AUTO: 78.4 FL (ref 80–100)
MICROSCOPIC EXAMINATION: YES
NITRITE, URINE: NEGATIVE
ORGANISM: ABNORMAL
OSMOLALITY URINE: 456 MOSM/KG (ref 390–1070)
OSMOLALITY: 274 MOSM/KG (ref 280–301)
PDW BLD-RTO: 16.8 % (ref 12.4–15.4)
PH UA: 7.5 (ref 5–8)
PHOSPHORUS: 2.8 MG/DL (ref 2.5–4.9)
PHOSPHORUS: 2.9 MG/DL (ref 2.5–4.9)
PLATELET # BLD: 406 K/UL (ref 135–450)
PMV BLD AUTO: 7 FL (ref 5–10.5)
POTASSIUM SERPL-SCNC: 3.3 MMOL/L (ref 3.5–5.1)
POTASSIUM SERPL-SCNC: 3.5 MMOL/L (ref 3.5–5.1)
POTASSIUM, UR: 62.4 MMOL/L
PROTEIN PROTEIN: 0.07 G/DL
PROTEIN PROTEIN: 70 MG/DL
PROTEIN UA: ABNORMAL MG/DL
RBC # BLD: 4.62 M/UL (ref 4–5.2)
RBC UA: 4 /HPF (ref 0–4)
REPORT: NORMAL
SODIUM BLD-SCNC: 132 MMOL/L (ref 136–145)
SODIUM BLD-SCNC: 132 MMOL/L (ref 136–145)
SODIUM URINE: 23 MMOL/L
SPECIFIC GRAVITY UA: 1.02 (ref 1–1.03)
THIS TEST SENT TO: NORMAL
TSH SERPL DL<=0.05 MIU/L-ACNC: 1.03 UIU/ML (ref 0.27–4.2)
URIC ACID, SERUM: 2.8 MG/DL (ref 2.6–6)
URINE CULTURE, ROUTINE: ABNORMAL
URINE TYPE: ABNORMAL
UROBILINOGEN, URINE: 1 E.U./DL
VANCOMYCIN RANDOM: <4 UG/ML
VANCOMYCIN TROUGH: <4 UG/ML (ref 10–20)
WBC # BLD: 11.3 K/UL (ref 4–11)
WBC UA: 2 /HPF (ref 0–5)

## 2021-01-26 PROCEDURE — 80069 RENAL FUNCTION PANEL: CPT

## 2021-01-26 PROCEDURE — 83935 ASSAY OF URINE OSMOLALITY: CPT

## 2021-01-26 PROCEDURE — 84156 ASSAY OF PROTEIN URINE: CPT

## 2021-01-26 PROCEDURE — 83735 ASSAY OF MAGNESIUM: CPT

## 2021-01-26 PROCEDURE — 2580000003 HC RX 258: Performed by: INTERNAL MEDICINE

## 2021-01-26 PROCEDURE — 36415 COLL VENOUS BLD VENIPUNCTURE: CPT

## 2021-01-26 PROCEDURE — 97530 THERAPEUTIC ACTIVITIES: CPT

## 2021-01-26 PROCEDURE — 84550 ASSAY OF BLOOD/URIC ACID: CPT

## 2021-01-26 PROCEDURE — 6360000002 HC RX W HCPCS: Performed by: FAMILY MEDICINE

## 2021-01-26 PROCEDURE — 85027 COMPLETE CBC AUTOMATED: CPT

## 2021-01-26 PROCEDURE — 6360000002 HC RX W HCPCS: Performed by: INTERNAL MEDICINE

## 2021-01-26 PROCEDURE — 82436 ASSAY OF URINE CHLORIDE: CPT

## 2021-01-26 PROCEDURE — 84300 ASSAY OF URINE SODIUM: CPT

## 2021-01-26 PROCEDURE — 84443 ASSAY THYROID STIM HORMONE: CPT

## 2021-01-26 PROCEDURE — 84133 ASSAY OF URINE POTASSIUM: CPT

## 2021-01-26 PROCEDURE — 82533 TOTAL CORTISOL: CPT

## 2021-01-26 PROCEDURE — 6370000000 HC RX 637 (ALT 250 FOR IP): Performed by: FAMILY MEDICINE

## 2021-01-26 PROCEDURE — 83930 ASSAY OF BLOOD OSMOLALITY: CPT

## 2021-01-26 PROCEDURE — 84166 PROTEIN E-PHORESIS/URINE/CSF: CPT

## 2021-01-26 PROCEDURE — 94760 N-INVAS EAR/PLS OXIMETRY 1: CPT

## 2021-01-26 PROCEDURE — 92526 ORAL FUNCTION THERAPY: CPT

## 2021-01-26 PROCEDURE — 80202 ASSAY OF VANCOMYCIN: CPT

## 2021-01-26 PROCEDURE — 81001 URINALYSIS AUTO W/SCOPE: CPT

## 2021-01-26 PROCEDURE — 99232 SBSQ HOSP IP/OBS MODERATE 35: CPT | Performed by: INTERNAL MEDICINE

## 2021-01-26 RX ORDER — HYDRALAZINE HYDROCHLORIDE 25 MG/1
25 TABLET, FILM COATED ORAL EVERY 8 HOURS SCHEDULED
Qty: 90 TABLET | Refills: 3 | DISCHARGE
Start: 2021-01-26 | End: 2021-12-09

## 2021-01-26 RX ORDER — TRAMADOL HYDROCHLORIDE 50 MG/1
50 TABLET ORAL EVERY 6 HOURS PRN
Qty: 12 TABLET | Refills: 0 | Status: SHIPPED | OUTPATIENT
Start: 2021-01-26 | End: 2021-01-29

## 2021-01-26 RX ORDER — CEPHALEXIN 500 MG/1
500 CAPSULE ORAL 4 TIMES DAILY
Qty: 16 CAPSULE | Refills: 0 | DISCHARGE
Start: 2021-01-26 | End: 2021-01-30

## 2021-01-26 RX ORDER — ASPIRIN 81 MG/1
81 TABLET, CHEWABLE ORAL DAILY
Qty: 30 TABLET | Refills: 3 | DISCHARGE
Start: 2021-01-27 | End: 2021-12-09

## 2021-01-26 RX ORDER — LANOLIN ALCOHOL/MO/W.PET/CERES
3 CREAM (GRAM) TOPICAL NIGHTLY PRN
Qty: 30 TABLET | Refills: 3 | DISCHARGE
Start: 2021-01-26

## 2021-01-26 RX ADMIN — HYDRALAZINE HYDROCHLORIDE 25 MG: 25 TABLET, FILM COATED ORAL at 05:47

## 2021-01-26 RX ADMIN — HYDRALAZINE HYDROCHLORIDE 25 MG: 25 TABLET, FILM COATED ORAL at 13:53

## 2021-01-26 RX ADMIN — TRAMADOL HYDROCHLORIDE 50 MG: 50 TABLET ORAL at 03:51

## 2021-01-26 RX ADMIN — PANTOPRAZOLE SODIUM 40 MG: 40 TABLET, DELAYED RELEASE ORAL at 09:03

## 2021-01-26 RX ADMIN — NIFEDIPINE 30 MG: 30 TABLET, EXTENDED RELEASE ORAL at 09:04

## 2021-01-26 RX ADMIN — ENOXAPARIN SODIUM 30 MG: 30 INJECTION SUBCUTANEOUS at 09:04

## 2021-01-26 RX ADMIN — VANCOMYCIN HYDROCHLORIDE 750 MG: 750 INJECTION, POWDER, LYOPHILIZED, FOR SOLUTION INTRAVENOUS at 13:51

## 2021-01-26 RX ADMIN — POTASSIUM BICARBONATE 40 MEQ: 782 TABLET, EFFERVESCENT ORAL at 09:03

## 2021-01-26 RX ADMIN — ASPIRIN 81 MG: 81 TABLET, CHEWABLE ORAL at 09:03

## 2021-01-26 ASSESSMENT — PAIN SCALES - WONG BAKER
WONGBAKER_NUMERICALRESPONSE: 0
WONGBAKER_NUMERICALRESPONSE: 0

## 2021-01-26 ASSESSMENT — PAIN DESCRIPTION - LOCATION: LOCATION: HEAD

## 2021-01-26 ASSESSMENT — PAIN DESCRIPTION - PROGRESSION: CLINICAL_PROGRESSION: NOT CHANGED

## 2021-01-26 ASSESSMENT — PAIN SCALES - GENERAL
PAINLEVEL_OUTOF10: 0
PAINLEVEL_OUTOF10: 0
PAINLEVEL_OUTOF10: 3

## 2021-01-26 ASSESSMENT — PAIN DESCRIPTION - DESCRIPTORS: DESCRIPTORS: ACHING

## 2021-01-26 ASSESSMENT — PAIN DESCRIPTION - ONSET: ONSET: ON-GOING

## 2021-01-26 NOTE — PROGRESS NOTES
Department of Internal Medicine  Nephrology Progress Note    SUBJECTIVE:  We are following this patient for hyponatremia . Patient progress reviewed. REVIEW OF SYSTEMS:  More awake , no confusion . No family present . Physical Exam:    VITALS:  /75   Pulse 96   Temp 98.6 °F (37 °C) (Oral)   Resp 18   Ht 5' 3\" (1.6 m)   Wt 100 lb 5 oz (45.5 kg)   SpO2 96%   BMI 17.77 kg/m²   24HR INTAKE/OUTPUT:      Intake/Output Summary (Last 24 hours) at 1/26/2021 1107  Last data filed at 1/26/2021 0407  Gross per 24 hour   Intake 717.67 ml   Output 460 ml   Net 257.67 ml       Constitutional:  Resting   Respiratory:  CTA  Gastrointestinal:  No  tenderness.   Normal Bowel Sounds  Cardiovascular:  S1, S2 RRR   Edema:  Lower Extremity has no edema    DATA:    CBC:  Lab Results   Component Value Date    WBC 11.3 01/26/2021    RBC 4.62 01/26/2021    HGB 11.8 01/26/2021    HCT 36.2 01/26/2021    MCV 78.4 01/26/2021    MCH 25.5 01/26/2021    MCHC 32.6 01/26/2021    RDW 16.8 01/26/2021     01/26/2021    MPV 7.0 01/26/2021     CMP:  Lab Results   Component Value Date     01/26/2021    K 3.3 01/26/2021    K 4.0 01/19/2021    CL 95 01/26/2021    CO2 25 01/26/2021    BUN 9 01/26/2021    CREATININE 0.8 01/26/2021    GFRAA >60 01/26/2021    GFRAA >60 05/16/2013    AGRATIO 0.8 01/19/2021    LABGLOM >60 01/26/2021    GLUCOSE 92 01/26/2021    PROT 9.0 01/19/2021    PROT 8.2 10/04/2011    CALCIUM 8.4 01/26/2021    BILITOT 0.7 01/19/2021    ALKPHOS 110 01/19/2021    AST 33 01/19/2021    ALT 11 01/19/2021      Hepatic Function Panel:   Lab Results   Component Value Date    ALKPHOS 110 01/19/2021    ALT 11 01/19/2021    AST 33 01/19/2021    PROT 9.0 01/19/2021    PROT 8.2 10/04/2011    BILITOT 0.7 01/19/2021    BILIDIR <0.2 11/20/2016    IBILI see below 11/20/2016      Phosphorus:   Lab Results   Component Value Date    PHOS 2.8 01/26/2021       ASSESSMENT:  Active Problems:    Sepsis ([address]  Resolved Problems:    * No resolved hospital problems. *      PLAN:  1. Hyponatremia Etiology hypo volumia vs SIADH? Meds? ( off ACE-I/HCTZ)   2. Improved . Dc IVF . 3. Hypokalemia will supp . 4. UTI on abx   5. H/o CVA plan per admitting team   6.  Encephalopathy resolving     Aliyah Samuels MD. [address]

## 2021-01-26 NOTE — PLAN OF CARE
Problem: Falls - Risk of:  Goal: Will remain free from falls  Description: Will remain free from falls  Outcome: Ongoing     Problem: Falls - Risk of:  Goal: Absence of physical injury  Description: Absence of physical injury  Outcome: Ongoing     Problem: Skin Integrity:  Goal: Will show no infection signs and symptoms  Description: Will show no infection signs and symptoms  Outcome: Ongoing     Problem: Skin Integrity:  Goal: Absence of new skin breakdown  Description: Absence of new skin breakdown  Outcome: Ongoing     Problem: Infection:  Goal: Will remain free from infection  Description: Will remain free from infection  Outcome: Ongoing     Problem: Safety:  Goal: Free from accidental physical injury  Description: Free from accidental physical injury  Outcome: Ongoing     Problem: Safety:  Goal: Free from intentional harm  Description: Free from intentional harm  Outcome: Ongoing     Problem: Daily Care:  Goal: Daily care needs are met  Description: Daily care needs are met  Outcome: Ongoing     Problem: Pain:  Goal: Patient's pain/discomfort is manageable  Description: Patient's pain/discomfort is manageable  Outcome: Ongoing     Problem: Skin Integrity:  Goal: Skin integrity will stabilize  Description: Skin integrity will stabilize  Outcome: Ongoing   Electronically signed by Niki Saleem RN on 1/26/2021 at 12:28 PM

## 2021-01-26 NOTE — DISCHARGE SUMMARY
Hospital Medicine Discharge Summary    Patient ID: Chente Stein      Patient's PCP: Ilya Raiza Date: 1/19/2021     Discharge Date:   1/26/21    Admitting Physician: Zaid Diaz MD     Discharge Physician: June Ray MD     Discharge Diagnoses: Active Hospital Problems    Diagnosis Date Noted    Sepsis [facility]) [A41.9] 01/19/2021       The patient was seen and examined on day of discharge and this discharge summary is in conjunction with any daily progress note from day of discharge. Hospital Course:   Douglas Peterson y.o. female with PMH CVA, HTN, GERD, GIB 2/2 PUD, tobacco abuse who presented to [place] [address] with worsening left sided weakness, confusion, slurred speech and decreased responsiveness.  She was treated by EMS for hypoglycemia, and was admitted for sepsis 2/2 UTI.    Urine culture growing Aerococcus, patient is allergic to penicillin so antibiotics choice complicated. Sent outside lab for susceptibilities, pending. Symptomatic improvement on Rocephin, refused labs for a few days and now with increased white count.     Code stroke called 1/21, negative work-up. Acute metabolic encephalopathy, improved  Due to hypoglycemia and Sepsis. CT head negative for acute abnormality  - treat accordingly and monitor   - Speech thearpy - dysphagia diet  - MRI brain - no acute CVA  - neuro checks  - pt/ot; 3-5 sessions per week     Stroke alert 1/21- episode of alertered awareness, aphasia, weakness  - CT head - no acute changes  - concern for possible TIAs  - neurology c/s - recommends resuming antiplatelet, will start asa 81 mg and monitor closely given hx GIB     UTI  Urine culture - aerococcus species, pending speciation as patient is allergic to penicillin  - on rocephin-- clinical improvement though WBC uptrending.  Spoke to lab 1/23 and 1/24, culture sent to outside lab and sensitivities still pending  - will switch to vanc pending sensitivities with increased white blood cell count  - ID consult-- patient refused labs for a few days, WBC now increased though clinically improved  DC with 4 days of Keflex     Sepsis POA  Afebrile, leukocytosis resolved. 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 HEENT: Pupils equal, round, and reactive to light. Conjunctivae/corneas clear. Neck: Supple, with full range of motion. No jugular venous distention. Trachea midline. Respiratory:  Normal respiratory effort. Cardiovascular: Regular rate and rhythm with normal S1/S2   Abdomen: Soft, non-tender, non-distended with normal bowel sounds. Musculoskeletal: No clubbing, cyanosis or edema bilaterally. Skin: Skin color, texture, turgor normal.  No rashes or lesions. Neurologic:  Neurovascularly intact without any focal sensory/motor deficits.  Cranial nerves: II-XII intact, grossly non-focal.  Psychiatric: Alert and oriented, thought content appropriate, normal insight      Consults:     IP CONSULT TO STROKE TEAM  IP CONSULT TO NEUROLOGY  IP CONSULT TO HOME CARE NEEDS  PHARMACY TO DOSE VANCOMYCIN  IP CONSULT TO INFECTIOUS DISEASES  IP CONSULT TO NEPHROLOGY    Significant Diagnostic Studies:     CT HEAD WO CONTRAST   Final Result   No definite evidence of acute intracranial abnormality on a study limited as   described above. Critical results were called by Dr. Fabien Garcia. [facility]   on 1/21/2021 at 12:35. MRI BRAIN WO CONTRAST   Final Result   No evidence for restricted diffusion. Diffuse volume loss chronic white   matter changes. Incidental note is made of a intrasellar mass suspicious for macro adenoma   measuring 1.4 x 1.4 x 1.5 cm         XR CHEST PORTABLE   Final Result   No convincing evidence for acute cardiopulmonary pathology. Findings suggestive of COPD. CTA HEAD NECK W CONTRAST   Final Result   1. 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 This   is presumed chronic in nature. 2. Mild-to-moderate stenosis at the origin of the right vertebral artery. 3. Moderate focal stenosis of the left subclavian artery. 4. No hemodynamically significant stenosis or branch occlusion in the   cervical carotid arteries. 5. Moderate focal stenosis of M2 branch of left middle cerebral artery. Mild   stenosis within left M1 segment. 6. 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 CT HEAD WO CONTRAST   Final Result   No acute intracranial abnormality. Mild prominence of the pituitary gland is not significantly changed dating   back to 11/1/2018. Underlying adenoma would be difficult to exclude. If   clinically indicated this could be further evaluated with MRI. Critical results were called by Dr. Karel Thompson to [address] [place] [address] on   1/19/2021 at 08:32. Disposition: SNF    Condition at Discharge: Stable    Discharge Instructions/Follow-up: PCP    Code Status:  Full Code     Activity: activity as tolerated    Diet: DIET GENERAL; Dysphagia Soft and Bite-Sized      Labs:  For convenience and continuity at follow-up the following most recent labs are provided:      CBC:    Lab Results   Component Value Date    WBC 11.3 01/26/2021    HGB 11.8 01/26/2021    HCT 36.2 01/26/2021     01/26/2021       Renal:    Lab Results   Component Value Date     01/26/2021    K 3.5 01/26/2021    K 4.0 01/19/2021    CL 95 01/26/2021    CO2 23 01/26/2021    BUN 9 01/26/2021    CREATININE 0.8 01/26/2021    CALCIUM 8.5 01/26/2021    PHOS 2.9 01/26/2021       Discharge Medications:     Current Discharge Medication List           Details   aspirin 81 MG chewable tablet Take 1 tablet by mouth daily  Qty: 30 tablet, Refills: 3      hydrALAZINE (APRESOLINE) 25 MG tablet Take 1 tablet by mouth every 8 hours  Qty: 90 tablet, Refills: 3      melatonin 3 MG TABS tablet Take 1 tablet by mouth nightly as needed (Sleep)  Qty: 30 tablet, Refills: 3      cephALEXin (KEFLEX) 500 MG capsule Take 1 capsule by mouth 4 times daily for 4 days  Qty: 16 capsule, Refills: 0              Details   traMADol (ULTRAM) 50 MG tablet Take 1 tablet by mouth every 6 hours as needed for Pain for up to 3 days. Qty: 12 tablet, Refills: 0    Comments: Reduce doses taken as pain becomes manageable  Associated Diagnoses: Fall against object              Details   docusate sodium (COLACE) 100 MG capsule Take 100 mg by mouth daily      calcium-vitamin D (OSCAL-500) 500-200 MG-UNIT per tablet Take 1 tablet by mouth 2 times daily      traZODone (DESYREL) 50 MG tablet Take 50 mg by mouth nightly as needed for Sleep      pantoprazole (PROTONIX) 40 MG tablet Take 40 mg by mouth daily      oxybutynin (DITROPAN) 5 MG tablet Take 5 mg by mouth 2 times daily as needed (bladder spasms)      simvastatin (ZOCOR) 40 MG tablet Take 40 mg by mouth nightly      NIFEdipine (ADALAT CC) 30 MG extended release tablet Take 1 tablet by mouth daily  Qty: 30 tablet, Refills: 0             No future appointments.     Time Spent on discharge is more than 30 minutes in the examination, evaluation, counseling and review of medications and discharge plan. Signed:    Cyril Franco MD   1/26/2021      Thank you [facility] for the opportunity to be involved in this patient's care. If you have any questions or concerns please feel free to contact me at [phone].

## 2021-01-26 NOTE — PROGRESS NOTES
Physical Therapy  Facility/Department: [address] PROGRESSIVE CARE  Daily Treatment Note/Cotx with OT   NAME: Krishna Barry  : 1940  MRN: [id number]    Date of Service: 2021  Discharge Recommendations:  3-5 sessions per week, Patient would benefit from continued therapy after discharge   PT Equipment Recommendations  Other: has wheeled walker, wheelchair, and rollator at home    Assessment   Body structures, Functions, Activity limitations: Decreased functional mobility ; Decreased strength;Decreased safe awareness;Decreased cognition;Decreased endurance;Decreased balance  Assessment: Patient is an 78 yo female admitted with exacerbation of residual left sided weakness from previous stroke. She was found to have UTI with sepsis. Patient normally is able to ambulate about the house with independence with use of a wheeled walker and she is normally able to stand on her own. She does have a personal care attendent from [place] that comes out 3 days a week, but she has times she is alone and functions around the apartment on her own. She does receive assistance from son for bathing and dressing normally. This date, pt did participat in therapy session needing Mod A x 1-2 for supin<>sitting to edge of bed. Pt was able to stand briefly in [place], with Mod A x 2 persons, but declined being out of bed. Pt needs significantly increased time to complete little mobility, and needs encouragement as well. Pt continues with decreased safety awareness and is unable to recognize her inability to return home at this time. Patient is well below baseline and would be unsafe to return home at this time. Cont to recommend ongoing skilled PT at 3-5 x wk intensity for discharge needs. Will continue with therapy until d/c from acute setting. Krishna Barry scored a 9/24 on the AM-PAC short mobility form.  Current research shows that an AM-PAC score of 17 or less is typically not associated with a discharge to the responsiveness. History taken per chart review, RN, and son at bedside. Patient has history CVA w/ residual left arm and leg weakness. Last seen well at 8 pm last night, this morning her son found her with worsening left sided weakness, confusion, slurred speech and decreased responsiveness. EMS found patient with serum glucose too low to read. She received 15 g oral glucose with improvement in her blood sugar level. Patient started improving after she was given gucose, she is more alert and speaking without signifcant slurr but still is confused. Noted to have fever of 102 and started on abx in ED for sepsis due to UTI. Patient unable to given history, is following some commands\"  Family / Caregiver Present: Yes(granddaughter in at end of session.)  Referring Practitioner: Setfani Hare MD  Subjective  Subjective: Pt in supine, cont to be sarcastic/talkative about any/everything, except the task at hand. Pt needs significant amount of time, with motivation, to complete little activity. \"Hold on\" and \"Let me do it\" were repeated numerous times, with ample time given--but ultimately pt needs assistance physically. Orientation  Orientation  Overall Orientation Status: Impaired  Orientation Level: Oriented to place;Oriented to person;Disoriented to situation;Disoriented to time     Objective   Bed mobility  Supine to Sit: Moderate assistance;2 Person assistance(assist x 1-2 person at times, as pt tends to 'pick' whome she wants to help her or not--which tends to change thru session.)  Sit to Supine: Moderate assistance;2 Person assistance  Scootin Person assistance;Maximal assistance;Dependent/Total(A x 2 scooting up into bed at end of session.)  Transfers  Sit to Stand: Moderate Assistance;2 Person Assistance(to/fron Stedy with significantly increased time to complete task.   Pt with slow moving/talkative tendencies.)  Stand to sit: Moderate Assistance;2 Person Assistance  Bed to Chair: Unable to

## 2021-01-26 NOTE — PROGRESS NOTES
Occupational Therapy  Facility/Department: [facility] 5W PROGRESSIVE CARE  Daily Treatment Note  NAME: Stephanie Meza  : 1940  MRN: [id number]    Date of Service: 2021    Discharge Recommendations:  3-5 sessions per week, Patient would benefit from continued therapy after discharge       Assessment   Performance deficits / Impairments: Decreased functional mobility ; Decreased endurance;Decreased ADL status; Decreased balance;Decreased strength;Decreased high-level IADLs;Decreased safe awareness;Decreased cognition;Decreased fine motor control;Decreased ROM  Assessment: Pt initially resistant to therapy attempts but with encouragement agreed to sitting EOB. Pt is very distractable and frequent redirection to complete tasks. Pt requires tactile cues to begin tasks. She required assist of 1-2 to complete bed mobility. She required assist x 2 to stand to the stedy. Anticipate pt will require max assist for LB dressing based on strength and ROM. Pt would benefit from continued therapy due to functioning below her baseline and family cannot provide extra help at home. Prognosis: Good  REQUIRES OT FOLLOW UP: Yes  Activity Tolerance  Activity Tolerance: Treatment limited secondary to decreased cognition  Safety Devices  Type of devices: Call light within reach; Bed alarm in place; Left in bed;Nurse notified         Patient Diagnosis(es): The primary encounter diagnosis was Urinary tract infection with hematuria, site unspecified. A diagnosis of Sepsis with acute renal failure without septic shock, due to unspecified organism, unspecified acute renal failure type [facility]) was also pertinent to this visit. has a past medical history of CARROLL positive, Arthritis, Bleeding stomach ulcer, Diverticulitis, GERD (gastroesophageal reflux disease), Hypertension, Stroke ([facility] [address] and Tobacco abuse.   has a past surgical history that includes Colonoscopy (); Hysterectomy, total abdominal ('s);  Cholecystectomy (); transferring better and get cognition improved in order to return home       Therapy Time   Individual Concurrent Group Co-treatment   Time In 1030         Time Out 1130         Minutes 60              This note to serve as OT d/c summary if pt is d/c-ed from hospital prior to next OT session.       Kimberly Dunn, [address]

## 2021-01-26 NOTE — PROGRESS NOTES
[facility]   Speech Therapy  Daily Dysphagia Treatment Note    Glory Ervin  AGE: 79 y.o. GENDER: female  : 1940  [id number]  EPISODE DATE:  2021     Patient Active Problem List   Diagnosis    Age-related osteoporosis with current pathol fracture of vertebra ([address]    Acute right-sided low back pain with sciatica    History of CVA with residual deficit    Tobacco abuse    Multiple closed fractures of ribs    Essential hypertension, benign    Generalized weakness    Hyperlipidemia    Fall against object    Hypokalemia    Severe protein-calorie malnutrition ([place]: less than 60% of standard weight) (HCC)    Hypertensive urgency    Lower GI bleed    Sepsis (HCC)     Allergies   Allergen Reactions    Pcn [Penicillins] Hives and Itching     itchy     Treatment Diagnosis: Dysphagia     Chart review:   · MD History and Physical Documentation revealed: Aamir Flores y.o. female with PMH CVA, HTN, GERD, GIB 2/2 PUD, tobacco abuse who presented to [facility] due to decrease responsiveness. History taken per chart review, RN, and son at bedside. Patient has [address] w/ residual left arm and leg weakness. Last seen well at 8 pm last night, this morning her son found her with worsening left sided weakness, confusion, slurred speech and decreased responsiveness.  EMS found patient with serum glucose too low to read.  She received 15 g oral glucose with improvement in her blood sugar level.  Patient started improving after she was given gucose, she is more alert and speaking without signifcant slurr but still is confused.  Noted to have fever of 102 and started on abx in ED for sepsis due to UTI. Patient unable to given history, is following some commands     ·  2021 CT Head  Impression   No acute intracranial abnormality.    Mild prominence of the pituitary gland is not significantly changed dating   back to 2018. Windell Khloe adenoma would be difficult to limited observation this date. Granddaughter reports good tolerance of current diet. · Will continue to follow for tolerance. · Discussed safe swallowing strategies and GERD precautions with pt and granddaughter. Diet Recommendations:  Soft and Bite-sized  Thin liquids  Recommended Form of Meds: Crushed in puree as able  Compensatory Swallowing Strategies: Upright as possible for all oral intake, Swallow 2 times per bite/sip, Remain upright for 30-45 minutes after meals(oral care)    Education:  Consulted and agree with results and recommendations: Patient, RN  Patient Education Response: No evidence of learning    Prognosis:   Good for dysphagia    Plan:     Continue Dysphagia Therapy: yes  Interventions: Therapeutic Interventions: Diet tolerance monitoring, Patient/Family education, Therapeutic PO trials with SLP(with diet upgrade when/if warranted)  Duration/Frequency of therapy while on unit: Duration/Frequency of Treatment  Duration/Frequency of Treatment: 3-5 times a week while on acute medical floor;additional recommendations to be determined at d/c  Discharge Instructions:   Anticipate potential for further skilled Speech Therapy for Dysphagia at discharge    This note serves as a D/C Summary in the event that this patient is discharged prior to the next therapy session.     Coded treatment time: 0  Total treatment time: 10    Electronically signed by  Ever Cassidy  N Gita Connell Pathologist  [phone]

## 2021-01-26 NOTE — PROGRESS NOTES
Procedure Laterality Date    CHOLECYSTECTOMY  2014    COLONOSCOPY  2011    COLONOSCOPY N/A 12/31/2019    COLONOSCOPY DIAGNOSTIC performed by Anna Marie Chapman DO at [address], TOTAL ABDOMINAL  1970's    JACQUELYN/BSO for 'tumor'       Current Medications:    Outpatient Medications Marked as Taking for the 1/19/21 encounter [facility] Encounter)   Medication Sig Dispense Refill    docusate sodium (COLACE) 100 MG capsule Take 100 mg by mouth daily      calcium-vitamin D (OSCAL-500) 500-200 MG-UNIT per tablet Take 1 tablet by mouth 2 times daily      lisinopril (PRINIVIL;ZESTRIL) 20 MG tablet Take 20 mg by mouth daily      traZODone (DESYREL) 50 MG tablet Take 50 mg by mouth nightly as needed for Sleep      pantoprazole (PROTONIX) 40 MG tablet Take 40 mg by mouth daily      oxybutynin (DITROPAN) 5 MG tablet Take 5 mg by mouth 2 times daily as needed (bladder spasms)      simvastatin (ZOCOR) 40 MG tablet Take 40 mg by mouth nightly      traMADol (ULTRAM) 50 MG tablet Take 50 mg by mouth every 6 hours as needed.       NIFEdipine (ADALAT CC) 30 MG extended release tablet Take 1 tablet by mouth daily 30 tablet 0    hydrochlorothiazide (HYDRODIURIL) 25 MG tablet Take 1 tablet by mouth daily 30 tablet 0       Allergies:  Pcn [penicillins]    Immunizations :   Immunization History   Administered Date(s) Administered    Influenza Virus Vaccine 01/24/2014, 12/05/2014, 02/09/2017    Influenza, High Dose (Fluzone 65 yrs and older) 10/17/2017    Influenza, Quadv, IM, PF (6 mo and older Fluzone, Flulaval, Fluarix, and 3 yrs and older Afluria) 11/20/2016    Pneumococcal Conjugate 13-valent ([id number]) 10/17/2017    Pneumococcal Polysaccharide ([id number]) 02/01/2005, 01/23/2014       Social History:    Social History     Tobacco Use    Smoking status: Current Every Day Smoker     Packs/day: 0.25     Years: 50.00     Pack years: 12.50     Types: Cigarettes    Smokeless tobacco: Never Used   Substance Use Topics    Alcohol use: No    Drug use: No     Social History     Tobacco Use   Smoking Status Current Every Day Smoker    Packs/day: 0.25    Years: 50.00    Pack years: 12.50    Types: Cigarettes   Smokeless Tobacco Never Used      Family History   Problem Relation Age of Onset    High Blood Pressure Mother     High Blood Pressure Father     High Blood Pressure Daughter     Cancer Son           REVIEW OF SYSTEMS:     Constitutional:   fevers + , chills, night sweats  Eyes:  negative for blurred vision, eye discharge, visual disturbance   HEENT:  negative for hearing loss, ear drainage,nasal congestion  Respiratory:  negative for cough, shortness of breath or hemoptysis   Cardiovascular:  negative for chest pain, palpitations, syncope  Gastrointestinal:  negative for nausea, vomiting, diarrhea, constipation, abdominal pain  Genitourinary:  negative for frequency, dysuria, urinary incontinence, hematuria  Hematologic/Lymphatic:  negative for easy bruising, bleeding and lymphadenopathy  Allergic/Immunologic:  negative for recurrent infections, angioedema, anaphylaxis   Endocrine:  negative for weight changes, polyuria, polydipsia and polyphagia  Musculoskeletal:  negative for joint  pain, swelling, decreased range of motion  Integumentary: No rashes, skin lesions  Neurological:  negative for headaches, slurred speech, unilateral weakness  Psychiatric: negative for hallucinations,confusion,agitation.                 PHYSICAL EXAM:      Vitals:    /75   Pulse 91   Temp 98.4 °F (36.9 °C) (Oral)   Resp 17   Ht 5' 3\" (1.6 m)   Wt 100 lb 5 oz (45.5 kg)   SpO2 93%   BMI 17.77 kg/m²     General Appearance: awake and in no acute distress, + pallor, no icterus   Skin: warm and dry, no rash or erythema  Head: normocephalic and atraumatic  Eyes: pupils equal, round, and reactive to light, conjunctivae normal  ENT: tympanic membrane, external ear and ear canal normal bilaterally, nose without deformity, nasal mucosa and turbinates normal without polyps  Neck: supple and non-tender without mass, no thyromegaly  no cervical lymphadenopathy  Pulmonary/Chest: clear to auscultation bilaterally- no wheezes, rales or rhonchi, normal air movement, no respiratory distress  Cardiovascular: normal rate, regular rhythm, normal S1 and S2, no murmurs, rubs, clicks, or gallops, no carotid bruits  Abdomen: soft, non-tender, non-distended, normal bowel sounds, no masses or organomegaly  Extremities: no cyanosis, clubbing or edema  Musculoskeletal: normal range of motion, no joint swelling, deformity or tenderness  Integumentary: No rashes, no abnormal skin lesions, no petechiae  Neurologic:  Left side weakness from previous CVA        Lines: iv     Data Review:    CBC:   Lab Results   Component Value Date    WBC 11.3 (H) 01/26/2021    HGB 11.8 (L) 01/26/2021    HCT 36.2 01/26/2021    MCV 78.4 (L) 01/26/2021     01/26/2021     RENAL:   Lab Results   Component Value Date    CREATININE 0.8 01/26/2021    BUN 9 01/26/2021     (L) 01/26/2021    K 3.3 (L) 01/26/2021    CL 95 (L) 01/26/2021    CO2 25 01/26/2021     SED RATE: No results found for: SEDRATE  CK: No results found for: CKTOTAL  CRP: No results found for: CRP  Hepatic Function Panel:   Lab Results   Component Value Date    ALKPHOS 110 01/19/2021    ALT 11 01/19/2021    AST 33 01/19/2021    PROT 9.0 01/19/2021    PROT 8.2 10/04/2011    BILITOT 0.7 01/19/2021    BILIDIR <0.2 11/20/2016    IBILI see below 11/20/2016    LABALBU 2.5 01/26/2021     UA:  Lab Results   Component Value Date    COLORU YELLOW 01/26/2021    CLARITYU Clear 01/26/2021    GLUCOSEU Negative 01/26/2021    GLUCOSEU NEGATIVE 07/10/2010    BILIRUBINUR Negative 01/26/2021    BILIRUBINUR NEGATIVE 07/10/2010    KETUA Negative 01/26/2021    SPECGRAV 1.020 01/26/2021    BLOODU Negative 01/26/2021    PHUR 7.5 01/26/2021    PROTEINU TRACE 01/26/2021    UROBILINOGEN 1.0 01/26/2021    NITRU Negative 01/26/2021    LEUKOCYTESUR Negative 01/26/2021    LABMICR YES 01/26/2021    URINETYPE NotGiven 01/26/2021      Urine Microscopic:   Lab Results   Component Value Date    BACTERIA 1+ 09/29/2018    COMU see below 01/19/2021    HYALCAST 1 01/26/2021    WBCUA 2 01/26/2021    RBCUA 4 01/26/2021    EPIU 12 01/26/2021     Urine Reflex to Culture:   Lab Results   Component Value Date    URRFLXCULT Yes 01/19/2021         MICRO: cultures reviewed and updated by me   Blood Culture:   Lab Results   Component Value Date    BC No Growth after 4 days of incubation. 01/19/2021    BLOODCULT2 No Growth after 4 days of incubation. 01/19/2021       Respiratory Culture:  No results found for: Jose Mi  AFB:No results found for: [facility]  Viral Culture:  Lab Results   Component Value Date    COVID19 Not Detected 01/25/2021     Urine Culture: No results for input(s): Robert Valdes in the last 72 hours. IMAGING:    CT HEAD WO CONTRAST   Final Result   No definite evidence of acute intracranial abnormality on a study limited as   described above. Critical results were called by Dr. Josias Qiu. Lewis Falcon MD to Jen Jauregui   on 1/21/2021 at 12:35. MRI BRAIN WO CONTRAST   Final Result   No evidence for restricted diffusion. Diffuse volume loss chronic white   matter changes. Incidental note is made of a intrasellar mass suspicious for macro adenoma   measuring 1.4 x 1.4 x 1.5 cm         XR CHEST PORTABLE   Final Result   No convincing evidence for acute cardiopulmonary pathology. Findings suggestive of COPD. CTA HEAD NECK W CONTRAST   Final Result   1. 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 This   is presumed chronic in nature. 2. Mild-to-moderate stenosis at the origin of the right vertebral artery. 3. Moderate focal stenosis of the left subclavian artery.    4. 60% of standard weight) ([facility] [address]    Hypertensive urgency    Lower GI bleed    Sepsis ([facility]     Sepsis on admission resolved  Severe hypoglycemia before admission she is not Diabetic   Change in mental status improved  Metabolic encephalopathy  improving  History of CVA with left-sided weakness  MRI brain negative  Chronic smoking  History of GI bleed  Urinary tract infection  Fevers resolved  Lactic acidosis on admission  WBC elevation on admission slowly trending down  HbA1c  5.3   DANIEL on admit  creatinine improved    Change in mental status on admission secondary to severe hypoglycemia sepsis and fevers all seems to be improved. Blood cultures remain negative urine culture positive. WBC still remain elevated. Given prior history of stroke there is some concern if she had aspiration event. Agree with IV antibiotic coverage.       Given the fevers are resolved clinically improved be okay for discharge on oral antibiotic will be able to choose oral cephalexin for discharge planning discussed with the primary team      Labs, Microbiology, Radiology and all the pertinent results from current hospitalization and  care every where were reviewed  by me as a part of the evaluation   Plan:   1. Cont IV Vancomycin   2. Cont IV Ceftriaxone x 2 gm x q 24 HRS  3. Aerococcus can be a true pathogen in elderly patients and given positive cx agree with treatment  4. Its usually sensitive to Cephalosporins and Ceftriaxone will cover UTI and possible aspiration  5. Trend WBC and Procal improving  6. Will choose oral abx cephalexin for discharge planning  7. We will sign off please call with any questions or concerns     Discussed with patient/Family and Nursing staff     Thanks for allowing me to participate in your patient's care and please call me with any questions or concerns.     Roberto Pelaez MD  Infectious Disease  [facility]) Physician  Phone: [phone]   Fax : [phone]

## 2021-01-27 LAB — URINE ELECTROPHORESIS INTERP: NORMAL

## 2021-01-27 NOTE — PROGRESS NOTES
Patient discharged to [facility], [facility] from [address] [facility] at 1900. Handoff report provided to [employer] as well as Saloni Sanchez RN from [facility]. Patient was made aware that she was leaving with transportation. At that time, patient verbally insisted that she was not going. This RN attempted to take telemetry and IV out, but was unsuccessful by herself. This RN got family members on the phone, but patient only became more upset while speaking with them. This RN spoke with patient's family to inform them of what was happening and that our staff would work with transportation to get the patient to her destination safely. With the help of a couple other nurses, this RN was able to encourage the patient to allow us to remove her IV. Telemetry and IV were ultimately removed without incident. Patient transferred to [facility] safely. [facility] made aware of patient's discharging disposition.     Electronically signed by Jimy Malcolm RN on 1/26/2021 at 7:24 PM

## 2021-12-09 ENCOUNTER — HOSPITAL ENCOUNTER (INPATIENT)
Age: 81
LOS: 2 days | Discharge: HOME | DRG: 378 | End: 2021-12-11
Attending: EMERGENCY MEDICINE | Admitting: HOSPITALIST
Payer: MEDICARE

## 2021-12-09 ENCOUNTER — APPOINTMENT (OUTPATIENT)
Dept: CT IMAGING | Age: 81
DRG: 378 | End: 2021-12-09
Payer: MEDICARE

## 2021-12-09 DIAGNOSIS — K92.2 GASTROINTESTINAL HEMORRHAGE, UNSPECIFIED GASTROINTESTINAL HEMORRHAGE TYPE: Primary | ICD-10-CM

## 2021-12-09 LAB
A/G RATIO: 1.4 (ref 1.1–2.2)
ABO/RH: NORMAL
ALBUMIN SERPL-MCNC: 4 G/DL (ref 3.4–5)
ALP BLD-CCNC: 78 U/L (ref 40–129)
ALT SERPL-CCNC: 8 U/L (ref 10–40)
ANION GAP SERPL CALCULATED.3IONS-SCNC: 12 MMOL/L (ref 3–16)
ANTIBODY SCREEN: NORMAL
APTT: 28.7 SEC (ref 26.2–38.6)
AST SERPL-CCNC: 18 U/L (ref 15–37)
BASOPHILS ABSOLUTE: 0 K/UL (ref 0–0.2)
BASOPHILS RELATIVE PERCENT: 0.3 %
BILIRUB SERPL-MCNC: 0.3 MG/DL (ref 0–1)
BLOOD BANK DISPENSE STATUS: NORMAL
BLOOD BANK DISPENSE STATUS: NORMAL
BLOOD BANK PRODUCT CODE: NORMAL
BLOOD BANK PRODUCT CODE: NORMAL
BPU ID: NORMAL
BPU ID: NORMAL
BUN BLDV-MCNC: 22 MG/DL (ref 7–20)
CALCIUM SERPL-MCNC: 9.2 MG/DL (ref 8.3–10.6)
CHLORIDE BLD-SCNC: 102 MMOL/L (ref 99–110)
CO2: 24 MMOL/L (ref 21–32)
CREAT SERPL-MCNC: 1 MG/DL (ref 0.6–1.2)
DESCRIPTION BLOOD BANK: NORMAL
DESCRIPTION BLOOD BANK: NORMAL
EOSINOPHILS ABSOLUTE: 0.1 K/UL (ref 0–0.6)
EOSINOPHILS RELATIVE PERCENT: 2.4 %
GFR AFRICAN AMERICAN: >60
GFR NON-AFRICAN AMERICAN: 53
GLUCOSE BLD-MCNC: 134 MG/DL (ref 70–99)
HCT VFR BLD CALC: 27.8 % (ref 36–48)
HCT VFR BLD CALC: 33.4 % (ref 36–48)
HCT VFR BLD CALC: 35.3 % (ref 36–48)
HEMOGLOBIN: 10.7 G/DL (ref 12–16)
HEMOGLOBIN: 11.1 G/DL (ref 12–16)
HEMOGLOBIN: 9.3 G/DL (ref 12–16)
INR BLD: 1.16 (ref 0.88–1.12)
LYMPHOCYTES ABSOLUTE: 1.2 K/UL (ref 1–5.1)
LYMPHOCYTES RELATIVE PERCENT: 21.6 %
MAGNESIUM: 2 MG/DL (ref 1.8–2.4)
MCH RBC QN AUTO: 25.6 PG (ref 26–34)
MCHC RBC AUTO-ENTMCNC: 31.4 G/DL (ref 31–36)
MCV RBC AUTO: 81.6 FL (ref 80–100)
MONOCYTES ABSOLUTE: 0.5 K/UL (ref 0–1.3)
MONOCYTES RELATIVE PERCENT: 9.5 %
NEUTROPHILS ABSOLUTE: 3.7 K/UL (ref 1.7–7.7)
NEUTROPHILS RELATIVE PERCENT: 66.2 %
OCCULT BLOOD DIAGNOSTIC: ABNORMAL
PDW BLD-RTO: 15.6 % (ref 12.4–15.4)
PLATELET # BLD: 298 K/UL (ref 135–450)
PMV BLD AUTO: 6.7 FL (ref 5–10.5)
POTASSIUM REFLEX MAGNESIUM: 3.4 MMOL/L (ref 3.5–5.1)
PROTHROMBIN TIME: 13.2 SEC (ref 9.9–12.7)
RBC # BLD: 4.33 M/UL (ref 4–5.2)
REASON FOR REJECTION: NORMAL
REJECTED TEST: NORMAL
SODIUM BLD-SCNC: 138 MMOL/L (ref 136–145)
TOTAL PROTEIN: 6.8 G/DL (ref 6.4–8.2)
TROPONIN: <0.01 NG/ML
WBC # BLD: 5.6 K/UL (ref 4–11)

## 2021-12-09 PROCEDURE — 36415 COLL VENOUS BLD VENIPUNCTURE: CPT

## 2021-12-09 PROCEDURE — C9113 INJ PANTOPRAZOLE SODIUM, VIA: HCPCS | Performed by: PHYSICIAN ASSISTANT

## 2021-12-09 PROCEDURE — 96375 TX/PRO/DX INJ NEW DRUG ADDON: CPT

## 2021-12-09 PROCEDURE — 83735 ASSAY OF MAGNESIUM: CPT

## 2021-12-09 PROCEDURE — 74174 CTA ABD&PLVS W/CONTRAST: CPT

## 2021-12-09 PROCEDURE — 85730 THROMBOPLASTIN TIME PARTIAL: CPT

## 2021-12-09 PROCEDURE — 85025 COMPLETE CBC W/AUTO DIFF WBC: CPT

## 2021-12-09 PROCEDURE — 85014 HEMATOCRIT: CPT

## 2021-12-09 PROCEDURE — 36556 INSERT NON-TUNNEL CV CATH: CPT

## 2021-12-09 PROCEDURE — 86923 COMPATIBILITY TEST ELECTRIC: CPT

## 2021-12-09 PROCEDURE — 86901 BLOOD TYPING SEROLOGIC RH(D): CPT

## 2021-12-09 PROCEDURE — C9113 INJ PANTOPRAZOLE SODIUM, VIA: HCPCS | Performed by: HOSPITALIST

## 2021-12-09 PROCEDURE — 86850 RBC ANTIBODY SCREEN: CPT

## 2021-12-09 PROCEDURE — 6360000004 HC RX CONTRAST MEDICATION: Performed by: PHYSICIAN ASSISTANT

## 2021-12-09 PROCEDURE — 99283 EMERGENCY DEPT VISIT LOW MDM: CPT

## 2021-12-09 PROCEDURE — 36430 TRANSFUSION BLD/BLD COMPNT: CPT

## 2021-12-09 PROCEDURE — 74176 CT ABD & PELVIS W/O CONTRAST: CPT

## 2021-12-09 PROCEDURE — 85018 HEMOGLOBIN: CPT

## 2021-12-09 PROCEDURE — 93005 ELECTROCARDIOGRAM TRACING: CPT | Performed by: EMERGENCY MEDICINE

## 2021-12-09 PROCEDURE — 96374 THER/PROPH/DIAG INJ IV PUSH: CPT

## 2021-12-09 PROCEDURE — 86900 BLOOD TYPING SEROLOGIC ABO: CPT

## 2021-12-09 PROCEDURE — 6360000002 HC RX W HCPCS: Performed by: HOSPITALIST

## 2021-12-09 PROCEDURE — 2060000000 HC ICU INTERMEDIATE R&B

## 2021-12-09 PROCEDURE — 6370000000 HC RX 637 (ALT 250 FOR IP): Performed by: HOSPITALIST

## 2021-12-09 PROCEDURE — 80053 COMPREHEN METABOLIC PANEL: CPT

## 2021-12-09 PROCEDURE — P9016 RBC LEUKOCYTES REDUCED: HCPCS

## 2021-12-09 PROCEDURE — 2580000003 HC RX 258: Performed by: PHYSICIAN ASSISTANT

## 2021-12-09 PROCEDURE — 82270 OCCULT BLOOD FECES: CPT

## 2021-12-09 PROCEDURE — 6360000002 HC RX W HCPCS: Performed by: EMERGENCY MEDICINE

## 2021-12-09 PROCEDURE — 2580000003 HC RX 258: Performed by: HOSPITALIST

## 2021-12-09 PROCEDURE — 6360000002 HC RX W HCPCS: Performed by: PHYSICIAN ASSISTANT

## 2021-12-09 PROCEDURE — 85610 PROTHROMBIN TIME: CPT

## 2021-12-09 PROCEDURE — 84484 ASSAY OF TROPONIN QUANT: CPT

## 2021-12-09 RX ORDER — ONDANSETRON 2 MG/ML
4 INJECTION INTRAMUSCULAR; INTRAVENOUS ONCE
Status: COMPLETED | OUTPATIENT
Start: 2021-12-09 | End: 2021-12-09

## 2021-12-09 RX ORDER — TRAMADOL HYDROCHLORIDE 50 MG/1
50 TABLET ORAL 3 TIMES DAILY PRN
COMMUNITY
Start: 2021-11-29 | End: 2022-06-04 | Stop reason: SDUPTHER

## 2021-12-09 RX ORDER — SODIUM CHLORIDE 9 MG/ML
INJECTION, SOLUTION INTRAVENOUS CONTINUOUS
Status: DISCONTINUED | OUTPATIENT
Start: 2021-12-09 | End: 2021-12-11 | Stop reason: HOSPADM

## 2021-12-09 RX ORDER — HYDROCHLOROTHIAZIDE 25 MG/1
25 TABLET ORAL DAILY
Status: ON HOLD | COMMUNITY
Start: 2021-11-24 | End: 2021-12-11 | Stop reason: HOSPADM

## 2021-12-09 RX ORDER — SODIUM CHLORIDE 9 MG/ML
25 INJECTION, SOLUTION INTRAVENOUS PRN
Status: DISCONTINUED | OUTPATIENT
Start: 2021-12-09 | End: 2021-12-09 | Stop reason: SDUPTHER

## 2021-12-09 RX ORDER — 0.9 % SODIUM CHLORIDE 0.9 %
500 INTRAVENOUS SOLUTION INTRAVENOUS ONCE
Status: COMPLETED | OUTPATIENT
Start: 2021-12-09 | End: 2021-12-09

## 2021-12-09 RX ORDER — OMEPRAZOLE 40 MG/1
40 CAPSULE, DELAYED RELEASE ORAL DAILY
COMMUNITY
Start: 2021-10-25

## 2021-12-09 RX ORDER — SODIUM CHLORIDE 0.9 % (FLUSH) 0.9 %
5-40 SYRINGE (ML) INJECTION PRN
Status: DISCONTINUED | OUTPATIENT
Start: 2021-12-09 | End: 2021-12-09 | Stop reason: SDUPTHER

## 2021-12-09 RX ORDER — SODIUM CHLORIDE 9 MG/ML
INJECTION, SOLUTION INTRAVENOUS PRN
Status: DISCONTINUED | OUTPATIENT
Start: 2021-12-09 | End: 2021-12-11 | Stop reason: HOSPADM

## 2021-12-09 RX ORDER — ONDANSETRON 2 MG/ML
4 INJECTION INTRAMUSCULAR; INTRAVENOUS EVERY 6 HOURS PRN
Status: DISCONTINUED | OUTPATIENT
Start: 2021-12-09 | End: 2021-12-11 | Stop reason: HOSPADM

## 2021-12-09 RX ORDER — SODIUM CHLORIDE 0.9 % (FLUSH) 0.9 %
5-40 SYRINGE (ML) INJECTION EVERY 12 HOURS SCHEDULED
Status: DISCONTINUED | OUTPATIENT
Start: 2021-12-09 | End: 2021-12-09 | Stop reason: SDUPTHER

## 2021-12-09 RX ORDER — TRAZODONE HYDROCHLORIDE 50 MG/1
50 TABLET ORAL NIGHTLY PRN
Status: DISCONTINUED | OUTPATIENT
Start: 2021-12-09 | End: 2021-12-11 | Stop reason: HOSPADM

## 2021-12-09 RX ORDER — LISINOPRIL 20 MG/1
20 TABLET ORAL DAILY
COMMUNITY
Start: 2021-11-26 | End: 2022-11-21

## 2021-12-09 RX ORDER — LIDOCAINE HYDROCHLORIDE 10 MG/ML
5 INJECTION, SOLUTION EPIDURAL; INFILTRATION; INTRACAUDAL; PERINEURAL ONCE
Status: DISCONTINUED | OUTPATIENT
Start: 2021-12-09 | End: 2021-12-11 | Stop reason: HOSPADM

## 2021-12-09 RX ORDER — SODIUM CHLORIDE 0.9 % (FLUSH) 0.9 %
5-40 SYRINGE (ML) INJECTION EVERY 12 HOURS SCHEDULED
Status: DISCONTINUED | OUTPATIENT
Start: 2021-12-09 | End: 2021-12-11 | Stop reason: HOSPADM

## 2021-12-09 RX ORDER — ONDANSETRON 4 MG/1
4 TABLET, ORALLY DISINTEGRATING ORAL EVERY 8 HOURS PRN
Status: DISCONTINUED | OUTPATIENT
Start: 2021-12-09 | End: 2021-12-11 | Stop reason: HOSPADM

## 2021-12-09 RX ORDER — SODIUM CHLORIDE 9 MG/ML
25 INJECTION, SOLUTION INTRAVENOUS PRN
Status: DISCONTINUED | OUTPATIENT
Start: 2021-12-09 | End: 2021-12-11 | Stop reason: HOSPADM

## 2021-12-09 RX ORDER — PANTOPRAZOLE SODIUM 40 MG/10ML
80 INJECTION, POWDER, LYOPHILIZED, FOR SOLUTION INTRAVENOUS ONCE
Status: COMPLETED | OUTPATIENT
Start: 2021-12-09 | End: 2021-12-09

## 2021-12-09 RX ORDER — ATORVASTATIN CALCIUM 10 MG/1
10 TABLET, FILM COATED ORAL DAILY
Status: DISCONTINUED | OUTPATIENT
Start: 2021-12-09 | End: 2021-12-11 | Stop reason: HOSPADM

## 2021-12-09 RX ORDER — NIFEDIPINE 30 MG/1
30 TABLET, EXTENDED RELEASE ORAL DAILY
Status: DISCONTINUED | OUTPATIENT
Start: 2021-12-10 | End: 2021-12-11 | Stop reason: HOSPADM

## 2021-12-09 RX ORDER — HYDRALAZINE HYDROCHLORIDE 25 MG/1
25 TABLET, FILM COATED ORAL EVERY 8 HOURS SCHEDULED
Status: DISCONTINUED | OUTPATIENT
Start: 2021-12-10 | End: 2021-12-11 | Stop reason: HOSPADM

## 2021-12-09 RX ORDER — OXYBUTYNIN CHLORIDE 5 MG/1
5 TABLET ORAL 2 TIMES DAILY PRN
Status: DISCONTINUED | OUTPATIENT
Start: 2021-12-09 | End: 2021-12-11 | Stop reason: HOSPADM

## 2021-12-09 RX ORDER — SODIUM CHLORIDE 0.9 % (FLUSH) 0.9 %
5-40 SYRINGE (ML) INJECTION PRN
Status: DISCONTINUED | OUTPATIENT
Start: 2021-12-09 | End: 2021-12-11 | Stop reason: HOSPADM

## 2021-12-09 RX ORDER — LANOLIN ALCOHOL/MO/W.PET/CERES
3 CREAM (GRAM) TOPICAL NIGHTLY PRN
Status: DISCONTINUED | OUTPATIENT
Start: 2021-12-09 | End: 2021-12-11 | Stop reason: HOSPADM

## 2021-12-09 RX ORDER — DOCUSATE SODIUM 100 MG/1
100 CAPSULE, LIQUID FILLED ORAL DAILY
Status: DISCONTINUED | OUTPATIENT
Start: 2021-12-10 | End: 2021-12-11 | Stop reason: HOSPADM

## 2021-12-09 RX ORDER — ACETAMINOPHEN 650 MG/1
650 SUPPOSITORY RECTAL EVERY 6 HOURS PRN
Status: DISCONTINUED | OUTPATIENT
Start: 2021-12-09 | End: 2021-12-11 | Stop reason: HOSPADM

## 2021-12-09 RX ORDER — LISINOPRIL 20 MG/1
20 TABLET ORAL DAILY
Status: DISCONTINUED | OUTPATIENT
Start: 2021-12-10 | End: 2021-12-11 | Stop reason: HOSPADM

## 2021-12-09 RX ORDER — ACETAMINOPHEN 325 MG/1
650 TABLET ORAL EVERY 6 HOURS PRN
Status: DISCONTINUED | OUTPATIENT
Start: 2021-12-09 | End: 2021-12-11 | Stop reason: HOSPADM

## 2021-12-09 RX ADMIN — SODIUM CHLORIDE 8 MG/HR: 9 INJECTION, SOLUTION INTRAVENOUS at 18:44

## 2021-12-09 RX ADMIN — ATORVASTATIN CALCIUM 10 MG: 10 TABLET, FILM COATED ORAL at 18:30

## 2021-12-09 RX ADMIN — SODIUM CHLORIDE: 9 INJECTION, SOLUTION INTRAVENOUS at 18:22

## 2021-12-09 RX ADMIN — ONDANSETRON 4 MG: 2 INJECTION INTRAMUSCULAR; INTRAVENOUS at 16:25

## 2021-12-09 RX ADMIN — SODIUM CHLORIDE 500 ML: 9 INJECTION, SOLUTION INTRAVENOUS at 16:19

## 2021-12-09 RX ADMIN — SODIUM CHLORIDE, PRESERVATIVE FREE 10 ML: 5 INJECTION INTRAVENOUS at 22:33

## 2021-12-09 RX ADMIN — IOPAMIDOL 75 ML: 755 INJECTION, SOLUTION INTRAVENOUS at 16:46

## 2021-12-09 RX ADMIN — PANTOPRAZOLE SODIUM 80 MG: 40 INJECTION, POWDER, FOR SOLUTION INTRAVENOUS at 16:19

## 2021-12-09 ASSESSMENT — PAIN SCALES - GENERAL: PAINLEVEL_OUTOF10: 0

## 2021-12-09 NOTE — PROGRESS NOTES
4 Eyes Skin Assessment     NAME:  Viky Deng OF BIRTH:  1940  MEDICAL RECORD NUMBER:  [id number]    The patient is being assess for  Admission    I agree that 2 RN's have performed a thorough Head to Toe Skin Assessment on the patient. ALL assessment sites listed below have been assessed. Areas assessed by both nurses:    Head, Face, Ears, Shoulders, Back, Chest, Arms, Elbows, Hands, Sacrum. Buttock, Coccyx, Ischium and Legs. Feet and Heels        Does the Patient have a Wound?  No noted wound(s)       Ricki Prevention initiated:  No   Wound Care Orders initiated:  No    Pressure Injury (Stage 3,4, Unstageable, DTI, NWPT, and Complex wounds) if present place consult order under [de-identified] No    New and Established Ostomies if present place consult order under : No      Nurse 1 eSignature: Electronically signed by Alexandr Booker RN on 12/9/21 at 6:12 PM EST    **SHARE this note so that the co-signing nurse is able to place an eSignature**    Nurse 2 eSignature: Electronically signed by Adryan Mckeon RN on 12/9/21 at 7:03 PM EST

## 2021-12-09 NOTE — ED NOTES
Called picc team [phone] at 1450 spoke to [address] [place] [address]  12/09/21 2873

## 2021-12-09 NOTE — ED PROVIDER NOTES
Attending Note:    The patient was seen and examined by the mid-level provider. I also completed a face-to-face examination. HPI: Jessica Delgadillo is a 79 y.o. female who presents to the emergency department with a complaint of rectal bleeding that began today prior to arrival.  She states that this is happened in the past.  She reports a large amount of dark red blood from the rectum. She denies any dizziness, lightheadedness, syncope, palpitations, chest pain, or shortness of breath. She does complain of some mild cramping in the lower abdomen. She denies any back or flank pain. She denies any dysuria hematuria frequency urgency. She denies any melena. She denies any alcohol use or NSAID use. She does take aspirin 81 mg daily. Medical history is significant for gastric ulcers, diverticulitis, gastroesophageal reflux disease, prior stroke with resulting left-sided weakness. She lives at home. She ambulates with a walker. She does not wear oxygen. She denies any cough or cold symptoms. No fever or chills. She has had prior colonoscopy. She reports that her prior rectal bleeding was secondary to diverticulitis. Physical Exam:     Constitutional: No apparent distress. Tearful. Worried about her symptoms. Head: No visible evidence of trauma. Normocephalic. Eyes: Pupils equal and reactive. No photophobia. Conjunctiva normal.    HENT: Oral mucosa moist.  Airway patent. Neck:  Soft and supple. Nontender. Heart:  Regular rate and rhythm. No murmur. Lungs:  Clear to auscultation. No wheezes, rales, or ronchi. No conversational dyspnea or accessory muscle use. Abdomen:  Soft, non-distended. Mild vague discomfort in the lower abdomen. No localizing tenderness. No guarding rigidity or rebound. Large amount of dark red blood noted in the bed where the patient had just had a bowel movement. Melena. Musculoskeletal: Extremities non-tender with full range of motion.   Radial and dorsalis pedis pulses were equal laterally. Neurological: Alert and oriented x 3. Speech clear. No acute focal motor or sensory deficits. No dysarthria. No aphasia. No pronator drift. Able to lift all extremities off the bed without difficulty. Skin: Skin is warm and dry. No rash. Psychiatric: Normal mood and affect. Behavior is normal.     DIAGNOSTIC RESULTS     EKG: All EKG's are interpreted by the Emergency Department Physician who either signs or Co-signs this chart in the absence of a cardiologist.    Normal sinus rhythm. Frequent PACs. Rate 87. TX interval 140 ms. QRS duration 80 ms. QTc 486 ms.  R axis 52 degrees. No ST elevation. EKG was unchanged from January 22, 2021. RADIOLOGY:   Non-plain film images such as CT, Ultrasound and MRI are read by the radiologist. Plain radiographic images are visualized and preliminarily interpreted by the emergency physician with the below findings:        Interpretation per the Radiologist below, if available at the time of this note:    CTA ABDOMEN PELVIS W CONTRAST   Preliminary Result   1. No areas of active GI bleeding are identified. 2. Scattered colonic diverticula. 3. Thickened bilateral adrenal glands, unchanged dating back to 2019, most   compatible with adenomas versus adrenal hyperplasia. 4. Chronic T12 and L1 fractures. 5. Polycystic kidney disease. 6. Severe atherosclerotic disease. The femoral arteries appear occluded   bilaterally. 7. Right inguinal and left femoral hernias. CT ABDOMEN PELVIS WO CONTRAST Additional Contrast? None   Final Result      1. Diverticulosis, most marked in the distal left through sigmoid colon but   without evidence of any definite colitis/diverticulitis. 2.  Small right inguinal hernia containing segment of unremarkable appearing   distal ileum but appears uncomplicated and similar to the prior study. 3.  Otherwise no acute pathology or significant change noted.                ED BEDSIDE ULTRASOUND:   Performed by ED Physician - none    LABS:  Labs Reviewed   CBC WITH AUTO DIFFERENTIAL - Abnormal; Notable for the following components:       Result Value    Hemoglobin 11.1 (*)     Hematocrit 35.3 (*)     MCH 25.6 (*)     RDW 15.6 (*)     All other components within normal limits    Narrative:     Performed at:  [facility]  [address] [facility] [employer] [address]   Phone [phone]   COMPREHENSIVE METABOLIC PANEL W/ REFLEX TO MG FOR LOW K - Abnormal; Notable for the following components:    Potassium reflex Magnesium 3.4 (*)     Glucose 134 (*)     BUN 22 (*)     GFR Non- 53 (*)     ALT 8 (*)     All other components within normal limits    Narrative:     Performed at:  [address] [employer] [address]   Phone [phone]   BLOOD OCCULT STOOL DIAGNOSTIC - Abnormal; Notable for the following components:    Occult Blood Diagnostic   (*)     Value: Result: POSITIVE  Normal range: Negative      All other components within normal limits    Narrative:     ORDER#: [id number]                          ORDERED BY: JOSEPHINE Dumont  SOURCE: Stool                              COLLECTED:  12/09/21 11:15  ANTIBIOTICS AT PEPITO.:                      RECEIVED :  12/09/21 11:34  Performed at:  [facility]  [address] [place] [address] [employer] [address]   Phone [phone]   PROTIME-INR - Abnormal; Notable for the following components:    Protime 13.2 (*)     INR 1.16 (*)     All other components within normal limits    Narrative:     Performed at:  [facility]  [address] [facility] [employer] [address]   Phone [phone]   HEMOGLOBIN AND HEMATOCRIT, BLOOD - Abnormal; Notable for the following components:    Hemoglobin 10.7 (*)     Hematocrit 33.4 (*)     All other components within normal limits    Narrative:     Performed at:  [facility]  [address] [place] [address]   Phone [phone]   MAGNESIUM    Narrative:     Performed at:  [facility]  [address] [place] [address]   Phone [phone]   SPECIMEN REJECTION    Narrative:     Performed at:  [facility]  [address]  [place] [address]   Phone [phone]   APTT    Narrative:     Performed at:  [facility]  [address] [place] [address]   Phone [phone]   TROPONIN    Narrative:     Performed at:  [facility]  [address] [facility] [place] [address]   Phone [phone]   TYPE AND SCREEN    Narrative:     Performed at:  [facility]  [address]  [place] [address]   Phone [phone]   PREPARE RBC (CROSSMATCH)       All other labs were within normal range or not returned as of this dictation. EMERGENCY DEPARTMENT COURSE and DIFFERENTIAL DIAGNOSIS/MDM:   Vitals:    Vitals:    12/09/21 1620 12/09/21 1632 12/09/21 1644 12/09/21 1718   BP: (!) 122/59 123/76 (!) 136/54 137/61   Pulse: 89 81 73 94   Resp: 18 19 20 18   Temp:    98.4 °F (36.9 °C)   SpO2:  93%         The patient presents with rectal bleeding as noted above. She is awake and alert. She is hemodynamically stable. No evidence of hypotension or tachycardia. CT abdomen and pelvis was completed without contrast.  No acute findings. She was given Protonix bolus and drip. Initial IV established by ultrasound in the right brachial area by nursing, infiltrated. PICC team has been contacted for PICC line placement but they were given an estimated arrival time of 2 hours. Therefore, it was necessary to place a central line for IV access. 3:30 PM: BUN 22. Creatinine 1.0. Hemoglobin 11.1. Platelet count 754. Hemoccult positive.     Repeat hemoglobin at 3:10 PM is 10.7. Ever, the patient had passage of very large amount of dark red blood from the rectum during placement of the central line. Given the large volume of blood with clots, we will go ahead and transfuse the patient. Bleeding seems to be ongoing. Heart rate is 90. Blood pressure 124/77. Was given normal saline 500 mL bolus. 5:31 PM: CTA abdomen and pelvis with IV contrast was reviewed. No areas of active GI bleeding identified. MDM      CRITICAL CARE TIME   Total Critical Care time was 35 minutes, excluding separately reportable procedures. There was a high probability of clinically significant/life threatening deterioration in the patient's condition which required my urgent intervention. CONSULTS:  IP CONSULT TO GI  IP CONSULT TO HOSPITALIST  IP CONSULT TO GI  IP CONSULT TO GI    PROCEDURES:  Unless otherwise noted below, none     Procedures    Right femoral central venous triple-lumen catheter insertion:    The procedure was completed under emergent conditions. Maximum sterile technique was used including cap, shield, mask, sterile gown, sterile gloves. The procedure was explained to the patient and risk and benefits were discussed including but not limited the possibility of bruising, bleeding, infection, nerve injury, vessel injury, etc.  She agreed to proceed. Informed consent was given. Gastroneurology was consulted and they physician spoke with gastroenterology on-call. The right groin was sterilely prepped and draped with ChloraPrep. Local anesthesia was given with 1% plain lidocaine subcutaneously. The right femoral vein was identified with a Cook needle on the first attempt with excellent venous return. Using the Seldinger technique a triple-lumen catheter was inserted on the first attempt without difficulty. There was excellent venous return at all 3 ports. Catheter was sutured in place using 2-0 silk suture. Ports were flushed with sterile saline. Biopatch was applied. Transparent sterile dressing was applied. Estimated blood loss was less than 1 mL. FINAL IMPRESSION      1. Gastrointestinal hemorrhage, unspecified gastrointestinal hemorrhage type          DISPOSITION/PLAN   DISPOSITION        PATIENT REFERRED TO:  No follow-up provider specified. DISCHARGE MEDICATIONS:  New Prescriptions    No medications on file     Controlled Substances Monitoring:     No flowsheet data found. (Please note that portions of this note were completed with a voice recognition program.  Efforts were made to edit the dictations but occasionally words are mis-transcribed. )    Aditya Mcadams DO (electronically signed)  Attending Emergency Physician      Eunice Santos DO  12/09/21 3841

## 2021-12-09 NOTE — CONSULTS
GASTROENTEROLOGY INPATIENT CONSULTATION        IDENTIFYING DATA/REASON FOR CONSULTATION   PATIENT:  Lucretia Kocher  MRN:  [id number]  ADMIT DATE: 12/9/2021  TIME OF EVALUATION: 12/9/2021 3:28 PM  HOSPITAL STAY:   LOS: 0 days     REASON FOR CONSULTATION:  Rectal bleeding    HISTORY OF PRESENT ILLNESS   Lucretia Kocher is a 79 y.o. female with a PMH of CVA, HTN, GERD, Arthritis, diverticulosis with prior diverticular bleeds, cholecystectomy, and hysterectomy who presented on 12/9/2021 with rectal bleeding. Pt reports it started this morning around 8am.  She describes passing large amount of bright red blood. She denies abdominal pain. She's had multiple prior episodes of painless rectal bleeding and underwent multiple colonoscopies (2014, 2016, 2017 and 2019) which have been poorly prepped but showed diverticulosis. The bleeding presumably due to a diverticular source in the left colon based on stool color being more red in the left colon, and more brown proximally (see colonoscopy reports below). EGD 2104 and 2016 neg. Initial CT A/P performed in ED showed left sided diverticulosis without evidence of colitis/diverticulitis and small inguinal hernia containing distal ileum. No other acute findings. Blood work shows a hgb of 11.1 and 10.7  Since being in the ED she's had persistent rectal bleeding. Rectal tube was placed. Central line placed. CTA was obtained which does no shows active bleeding. Her vital remains stable. Prior Endoscopic Evaluations:  Colonoscopy 12/31/2019 with Dr. Nicola Gomes  1) Moderate left sided and mild right sided diverticulosis was noted. 2) No colitis or colon polyps/masses were noted. 3) Small internal hemorrhoids were noted.     4) No fresh or altered blood was noted.      Colonoscopy 9/2017 with Dr. Nicola Gomes  1) Moderate left > right sided diverticulosis was noted  2) No colon polyps, masses, or colitis was noted.  3)  A focal 4mm diverticulum noted with a superficial ulceration without bleeding stigmata noted in the sigmoid colon.  The ulcer was 2mm in diameter.  This was not biopsied.  4) Small internal hemorrhoids were noted.     Flex sig 11/2016 with Dr. Jacob Null  1. Poor bowel preparation. Etiology of bleed is suspected to be secondary to diverticular bleeding. Patient refused to drink any additional bowel preparation. H/H stable. Recommend outpatient Cologuard test to exclude any malignancy given multiple unsuccessful attempts at colonoscopy due to poor preparation.     EGD/colonoscopy 6/2016 with Dr. Araceli Arechiga  1. left sided diverticulosis, old maroon blood in the left colon, no active bleeding. Stool brown in proximal transverse colon. Because colon was unprepped, could not go beyond the transverse colon  2.  Normal EGD     EGD/colonoscopy 12/2014 with Dr. Araceli Arechiga  1. Probable diverticular bleed in distal colon based on stool color being more red in the left colon, and more brown in the ileum and right colon. 2.  Diverticulosis throughout. 3.  Poor bowel prep. 4.  Normal EGD    PAST MEDICAL, SURGICAL, FAMILY, and SOCIAL HISTORY     Past Medical History:   Diagnosis Date    CARROLL positive     Arthritis     Bleeding stomach ulcer     Diverticulitis     GERD (gastroesophageal reflux disease)     Hypertension     Stroke ([facility] [address] 2002    left weakness     Tobacco abuse      Past Surgical History:   Procedure Laterality Date    CHOLECYSTECTOMY  2014    COLONOSCOPY  2011    COLONOSCOPY N/A 12/31/2019    COLONOSCOPY DIAGNOSTIC performed by Iris Noriega DO at [address], TOTAL ABDOMINAL  1970's    JACQUELYN/BSO for 'tumor'     Family History   Problem Relation Age of Onset    High Blood Pressure Mother     High Blood Pressure Father     High Blood Pressure Daughter     Cancer Son      Social History     Socioeconomic History    Marital status:       Spouse name: Not on file    Number of children: Not on file    Years of education: Not on file    Highest education level: Not on file   Occupational History    Not on file   Tobacco Use    Smoking status: Current Every Day Smoker     Packs/day: 0.25     Years: 50.00     Pack years: 12.50     Types: Cigarettes    Smokeless tobacco: Never Used   Vaping Use    Vaping Use: Never used   Substance and Sexual Activity    Alcohol use: No    Drug use: No    Sexual activity: Never   Other Topics Concern    Not on file   Social History Narrative    Resides in an apartment with her son    Uses a cane for ambulation     Social Determinants of Health     Financial Resource Strain:     Difficulty of Paying Living Expenses: Not on file   Food Insecurity:     Worried About Running Out of Food in the Last Year: Not on file    Emily of Food in the Last Year: Not on file   Transportation Needs:     Lack of Transportation (Medical): Not on file    Lack of Transportation (Non-Medical):  Not on file   Physical Activity:     Days of Exercise per Week: Not on file    Minutes of Exercise per Session: Not on file   Stress:     Feeling of Stress : Not on file   Social Connections:     Frequency of Communication with Friends and Family: Not on file    Frequency of Social Gatherings with Friends and Family: Not on file    Attends Restorationism Services: Not on file    Active Member of [address] [place] [address] or Organizations: Not on file    Attends Club or Organization Meetings: Not on file    Marital Status: Not on file   Intimate Partner Violence:     Fear of Current or Ex-Partner: Not on file    Emotionally Abused: Not on file    Physically Abused: Not on file    Sexually Abused: Not on file   Housing Stability:     Unable to Pay for Housing in the Last Year: Not on file    Number of [place] in the Last Year: Not on file    Unstable Housing in the Last Year: Not on file       MEDICATIONS   SCHEDULED:  pantoprazole, 80 mg, Once  lidocaine 1 % injection, 5 mL, Once  sodium chloride flush, 5-40 mL, 2 times per day      FLUIDS/DRIPS:     sodium chloride       PRNs: sodium chloride flush, 5-40 mL, PRN  sodium chloride, 25 mL, PRN      ALLERGIES:  She   Allergies   Allergen Reactions    Pcn [Penicillins] Hives and Itching     itchy       REVIEW OF SYSTEMS   Pertinent ROS noted in HPI    PHYSICAL EXAM     Vitals:    12/09/21 1415 12/09/21 1430   BP: 128/81 124/77   Pulse: 90 90   Resp: 24 22   SpO2: 96% 98%       No intake/output data recorded. Physical Exam:  General appearance: alert, cooperative, no distress, appears stated age  Eyes: Anicteric  Head: Normocephalic, without obvious abnormality  Lungs: clear to auscultation bilaterally, Normal Effort  Heart: regular rate and rhythm, normal S1 and S2, no murmurs or rubs  Abdomen: soft, non-distended, non-tender. Bowel sounds normal. No masses,  no organomegaly. Extremities: atraumatic, no cyanosis or edema  Skin: warm and dry, no jaundice  Neuro: Grossly intact, A&OX3      LABS AND IMAGING   Laboratory   Recent Labs     12/09/21  1115   WBC 5.6   HGB 11.1*   HCT 35.3*   MCV 81.6        Recent Labs     12/09/21  1115      K 3.4*      CO2 24   BUN 22*   CREATININE 1.0     Recent Labs     12/09/21  1115   AST 18   ALT 8*   BILITOT 0.3   ALKPHOS 78     No results for input(s): LIPASE, AMYLASE in the last 72 hours. No results for input(s): PROTIME, INR in the last 72 hours. Imaging  CT ABDOMEN PELVIS WO CONTRAST Additional Contrast? None   Final Result      1. Diverticulosis, most marked in the distal left through sigmoid colon but   without evidence of any definite colitis/diverticulitis. 2.  Small right inguinal hernia containing segment of unremarkable appearing   distal ileum but appears uncomplicated and similar to the prior study. 3.  Otherwise no acute pathology or significant change noted.                ASSESSMENT AND RECOMMENDATIONS   79 y.o. female with a PMH of PMH of CVA, HTN, GERD, Arthritis, diverticulosis with prior diverticular bleeds, cholecystectomy, and hysterectomy who presented on 12/9/2021 with rectal bleeding. IMPRESSION:  1. Painless rectal bleeding presumably diverticular bleed  -multiple prior episodes. Colonoscopies in past always been poorly prepped, thus unable to exclude other etiologies  -CTA neg for active bleeding. Vital stable. RECOMMENDATIONS:    Discussed case with Dr. Javier Quiroga. Will order bowel prep this evening and proceed with colonoscopy tomorrow. Pt agreeable for NGT placement to administer bowel prep. If you have any questions or need any further information, please feel free to contact our consult team.  Thank you for allowing us to participate in the care of Lucretia Kocher. The note was completed using Dragon voice recognition transcription. Every effort was made to ensure accuracy; however, inadvertent transcription errors may be present despite my best efforts to edit errors.       Jackie hCristie PA-C

## 2021-12-09 NOTE — ED NOTES
Bed: [facility]  Expected date:   Expected time:   Means of arrival: [address] [facility]  Comments:  81F adominal pain, rectal bleed     Grisel Duckworth RN  12/09/21 1052

## 2021-12-09 NOTE — PROGRESS NOTES
2 RNs attempting to place NG tube for bowel prep on four different occasions. Each time patient pulled NG tube back out. Patient is refusing to allow RN try again. Patient stating she will drink the GOLytely.

## 2021-12-09 NOTE — PROGRESS NOTES
Medication Reconciliation    List of medications for Ever Ramos is currently taking is complete.      Source of Information:   Epic records  Conversation with patient at bedside     Allergies  Allergy list not thoroughly reviewed with patient at this time  Allergies listed in Epic as follows: Pcn [penicillins]     Current Medications:    Current Facility-Administered Medications:     lidocaine PF 1 % injection 5 mL, 5 mL, IntraDERmal, Once, Crystal Ahumada PA-PIOTR    sodium chloride flush 0.9 % injection 5-40 mL, 5-40 mL, IntraVENous, 2 times per day, Ernestene Brownie, PA-C    sodium chloride flush 0.9 % injection 5-40 mL, 5-40 mL, IntraVENous, PRN, Ernestene Brownie, PA-C    0.9 % sodium chloride infusion, 25 mL, IntraVENous, PRN, Ernestene Brownie, PA-C    0.9 % sodium chloride infusion, , IntraVENous, PRN, Ernestene Brownie, PA-C    polyethylene glycol (GoLYTELY) solution 4,000 mL, 4,000 mL, Per NG tube, Once, DEAN Bailey    Current Outpatient Medications:     hydroCHLOROthiazide (HYDRODIURIL) 25 MG tablet, Take 25 mg by mouth daily, Disp: , Rfl:     lisinopril (PRINIVIL;ZESTRIL) 20 MG tablet, Take 20 mg by mouth daily, Disp: , Rfl:     omeprazole (PRILOSEC) 40 MG delayed release capsule, Take 40 mg by mouth daily, Disp: , Rfl:     traMADol (ULTRAM) 50 MG tablet, Take 50 mg by mouth 3 times daily as needed for Pain. , Disp: , Rfl:     melatonin 3 MG TABS tablet, Take 1 tablet by mouth nightly as needed (Sleep), Disp: 30 tablet, Rfl: 3    docusate sodium (COLACE) 100 MG capsule, Take 100 mg by mouth 2 times daily as needed for Constipation , Disp: , Rfl:     calcium-vitamin D (OSCAL-500) 500-200 MG-UNIT per tablet, Take 1 tablet by mouth 2 times daily, Disp: , Rfl:     traZODone (DESYREL) 50 MG tablet, Take 50 mg by mouth nightly as needed for Sleep, Disp: , Rfl:     oxybutynin (DITROPAN) 5 MG tablet, Take 5 mg by mouth 2 times daily , Disp: , Rfl:     simvastatin (ZOCOR) 40 MG tablet, Take 40 mg by mouth nightly, Disp: , Rfl:     NIFEdipine (ADALAT CC) 30 MG extended release tablet, Take 1 tablet by mouth daily, Disp: 30 tablet, Rfl: 0    Notes Regarding Home Medications:   Patient did not receive any of their home medications prior to arrival to the emergency department  Removed hydralazine and pantoprazole  Added hydrochlorothiazide, lisinopril, omeprazole, and tramadol PRN  Only uses docusate PRN  States she takes oxybutynin BID      Rogelio Aguilar [facility] [place], PharmD   12/9/2021 5:38 PM

## 2021-12-09 NOTE — ED NOTES
Writer spoke to Francis MORAN, PICC line will be ordered for pt.       Janine Alegria RN  12/09/21 2294

## 2021-12-09 NOTE — ED PROVIDER NOTES
[facility] Emergency Department    CHIEF COMPLAINT  Abdominal Pain (PT arrives via ems for eval of abdominal pain and rectal bleeding with hx of diverticulitis) and Rectal Bleeding      SHARED SERVICE VISIT  I have seen and evaluated this patient with my supervising physician, Dr. Michael Weller. HISTORY OF PRESENT ILLNESS  Elza Vasquez is a 79 y.o. female who presents to the ED complaining of rectal bleeding. Patient states that today she had multiple episodes of large amount of dark red blood with loose stool. Patient states she does have some mild lower abdominal discomfort. Patient states that she has had this in the past and is usually from \"diverticulitis\". Denies any anticoagulation. Denies any vomiting. No fevers or chills. No chest pain difficulty breathing. Denies prior blood transfusions. No other complaints, modifying factors or associated symptoms. Nursing notes reviewed. Past Medical History:   Diagnosis Date    CARROLL positive     Arthritis     Bleeding stomach ulcer     Diverticulitis     GERD (gastroesophageal reflux disease)     Hypertension     Stroke ([address] 2002    left weakness     Tobacco abuse      Past Surgical History:   Procedure Laterality Date    CHOLECYSTECTOMY  2014    COLONOSCOPY  2011    COLONOSCOPY N/A 12/31/2019    COLONOSCOPY DIAGNOSTIC performed by Anna Winters DO at [address] [facility], TOTAL ABDOMINAL  1970's    JACQUELYN/BSO for 'tumor'     Family History   Problem Relation Age of Onset    High Blood Pressure Mother     High Blood Pressure Father     High Blood Pressure Daughter     Cancer Son      Social History     Socioeconomic History    Marital status:       Spouse name: Not on file    Number of children: Not on file    Years of education: Not on file    Highest education level: Not on file   Occupational History    Not on file   Tobacco Use    Smoking status: Current Every Day Smoker     Packs/day: 0.25     Years: 50.00     Pack years: 12.50     Types: Cigarettes    Smokeless tobacco: Never Used   Vaping Use    Vaping Use: Never used   Substance and Sexual Activity    Alcohol use: No    Drug use: No    Sexual activity: Never   Other Topics Concern    Not on file   Social History Narrative    Resides in an apartment with her son    Uses a cane for ambulation     Social Determinants of Health     Financial Resource Strain:     Difficulty of Paying Living Expenses: Not on file   Food Insecurity:     Worried About Running Out of Food in the Last Year: Not on file    Emily of Food in the Last Year: Not on file   Transportation Needs:     Lack of Transportation (Medical): Not on file    Lack of Transportation (Non-Medical):  Not on file   Physical Activity:     Days of Exercise per Week: Not on file    Minutes of Exercise per Session: Not on file   Stress:     Feeling of Stress : Not on file   Social Connections:     Frequency of Communication with Friends and Family: Not on file    Frequency of Social Gatherings with Friends and Family: Not on file    Attends Confucianism Services: Not on file    Active Member of Clubs or Organizations: Not on file    Attends Club or Organization Meetings: Not on file    Marital Status: Not on file   Intimate Partner Violence:     Fear of Current or Ex-Partner: Not on file    Emotionally Abused: Not on file    Physically Abused: Not on file    Sexually Abused: Not on file   Housing Stability:     Unable to Pay for Housing in the Last Year: Not on file    Number of [place] in the Last Year: Not on file    Unstable Housing in the Last Year: Not on file     Current Facility-Administered Medications   Medication Dose Route Frequency Provider Last Rate Last Admin    lidocaine PF 1 % injection 5 mL  5 mL IntraDERmal Once Saumya Flannery PA-C        0.9 % sodium chloride infusion   IntraVENous PRN Saumya Flannery PA-C  [START ON 12/10/2021] docusate sodium (COLACE) capsule 100 mg  100 mg Oral Daily Trae Casas MD        melatonin tablet 3 mg  3 mg Oral Nightly PRN Trae Casas MD        [facility]) tablet 5 mg  5 mg Oral BID PRN Trae Casas MD        atorvastatin (LIPITOR) tablet 10 mg  10 mg Oral Daily Trae Casas MD        traZODone (DESYREL) tablet 50 mg  50 mg Oral Nightly PRN Trae Casas MD        0.9 % sodium chloride infusion   IntraVENous Continuous Trae Casas MD        sodium chloride flush 0.9 % injection 5-40 mL  5-40 mL IntraVENous 2 times per day Trae Casas MD        sodium chloride flush 0.9 % injection 5-40 mL  5-40 mL IntraVENous PRN Trae Casas MD        0.9 % sodium chloride infusion  25 mL IntraVENous PRN Trae Casas MD        ondansetron (ZOFRAN-ODT) disintegrating tablet 4 mg  4 mg Oral Q8H PRN Trae Casas MD        Or    ondansetron [facility]) injection 4 mg  4 mg IntraVENous Q6H PRN Trae Casas MD        acetaminophen (TYLENOL) tablet 650 mg  650 mg Oral Q6H PRN Trae Casas MD        Or    acetaminophen (TYLENOL) suppository 650 mg  650 mg Rectal Q6H PRN Trae Casas MD        pantoprazole (PROTONIX) 80 mg in sodium chloride 0.9 % 100 mL infusion  8 mg/hr IntraVENous Continuous Trae Casas MD        polyethylene glycol (GoLYTELY) solution 4,000 mL  4,000 mL Per NG tube Once DEAN Ingram         Allergies   Allergen Reactions    Pcn [Penicillins] Hives and Itching     itchy       REVIEW OF SYSTEMS  10 systems reviewed, pertinent positives per HPI otherwise noted to be negative    PHYSICAL EXAM nursing chaperone present during exam.  /70   Pulse 92   Temp 98.4 °F (36.9 °C) (Axillary)   Resp 16   SpO2 93%   GENERAL APPEARANCE: Awake and alert. Cooperative. No distress. HEAD: Normocephalic. Atraumatic. EYES: EOM's grossly intact.    ENT: Mucous membranes are moist.   NECK: Supple. HEART: RRR. No murmurs. LUNGS: Respirations unlabored. CTAB. Good air exchange. Speaking comfortably in full sentences. ABDOMEN: Soft. Non-distended. Non-tender. No guarding or rebound. No masses. No organomegaly. Rectal: Gross amount of dark red blood within the rectal vault. EXTREMITIES: No peripheral edema. Moves all extremities equally. All extremities neurovascularly intact. SKIN: Warm and dry. No acute rashes. NEUROLOGICAL: Alert and oriented. CN's 2-12 intact. No gross facial drooping. Strength 5/5, sensation intact. PSYCHIATRIC: Normal mood and affect. RADIOLOGY  CTA ABDOMEN PELVIS W CONTRAST   Preliminary Result   1. No areas of active GI bleeding are identified. 2. Scattered colonic diverticula. 3. Thickened bilateral adrenal glands, unchanged dating back to 2019, most   compatible with adenomas versus adrenal hyperplasia. 4. Chronic T12 and L1 fractures. 5. Polycystic kidney disease. 6. Severe atherosclerotic disease. The femoral arteries appear occluded   bilaterally. 7. Right inguinal and left femoral hernias. CT ABDOMEN PELVIS WO CONTRAST Additional Contrast? None   Final Result      1. Diverticulosis, most marked in the distal left through sigmoid colon but   without evidence of any definite colitis/diverticulitis. 2.  Small right inguinal hernia containing segment of unremarkable appearing   distal ileum but appears uncomplicated and similar to the prior study. 3.  Otherwise no acute pathology or significant change noted.              LABS  Labs Reviewed   CBC WITH AUTO DIFFERENTIAL - Abnormal; Notable for the following components:       Result Value    Hemoglobin 11.1 (*)     Hematocrit 35.3 (*)     MCH 25.6 (*)     RDW 15.6 (*)     All other components within normal limits    Narrative:     Performed at:  [facility]  [address] [facility] [address]   Phone [phone]   COMPREHENSIVE METABOLIC PANEL W/ REFLEX TO MG FOR LOW K - Abnormal; Notable for the following components:    Potassium reflex Magnesium 3.4 (*)     Glucose 134 (*)     BUN 22 (*)     GFR Non- 53 (*)     ALT 8 (*)     All other components within normal limits    Narrative:     Performed at:  [address]   Phone [phone]   BLOOD OCCULT STOOL DIAGNOSTIC - Abnormal; Notable for the following components:    Occult Blood Diagnostic   (*)     Value: Result: POSITIVE  Normal range: Negative      All other components within normal limits    Narrative:     ORDER#: [id number]                          ORDERED BY: JOSEPHINE Hernandez  SOURCE: Stool                              COLLECTED:  12/09/21 11:15  ANTIBIOTICS AT [place]PIT[place].:                      RECEIVED :  12/09/21 11:34  Performed at:  [address]   Phone [phone]   PROTIME-INR - Abnormal; Notable for the following components:    Protime 13.2 (*)     INR 1.16 (*)     All other components within normal limits    Narrative:     Performed at:  [address] [place]   Phone [phone]   HEMOGLOBIN AND HEMATOCRIT, BLOOD - Abnormal; Notable for the following components:    Hemoglobin 10.7 (*)     Hematocrit 33.4 (*)     All other components within normal limits    Narrative:     Performed at:  [address] [place]   Phone [phone]   MAGNESIUM    Narrative:     Performed at:  [facility]  [address] [place] [address] [place]   Phone [phone]   SPECIMEN REJECTION    Narrative:     Performed at:  [address] [place]   Phone [phone]   APTT Narrative:     Performed at:  [facility]  [address] [facility] [place] [facility] [address]   Phone [phone]   TROPONIN    Narrative:     Performed at:  [facility]  [address] [facility] [place] [facility] [address]   Phone [phone]   HEMOGLOBIN AND HEMATOCRIT, BLOOD   HEMOGLOBIN AND HEMATOCRIT, BLOOD   IRON AND TIBC   HEPATIC FUNCTION PANEL   TYPE AND SCREEN    Narrative:     Performed at:  [facility]  [address] [facility] [address]   Phone [phone]   PREPARE RBC (CROSSMATCH)       PROCEDURES  Unless otherwise noted below, none  Procedures    ED COURSE    ED Course as of 12/09/21 1818   Thu Dec 09, 2021   1606 On reevaluation patient continues to have gross amounts of rectal bleeding. Patient is alert and oriented vital signs remained stable at this time however will initiate blood transfusion [MM]      ED Course User Index  [MM] Alejandra Cantu PA-C       CRITICAL CARE TIME  The total critical care time spent while evaluating and treating this patient was 40 minutes. This excludes time spent doing separately billable procedures. This includes time at the bedside, data interpretation, medication management, obtaining critical history from collateral sources if the patient is unable to provide it directly, and physician consultation. Specifics of interventions taken and potentially life-threatening diagnostic considerations are listed above in the medical decision making. MDM  MDM  59-year-old female with a prior history of GI bleeds presents emergency department for evaluation of rectal bleeding. Bleeding started today. On arrival patient's vitals are within normal and she is afebrile nontachycardic without blood pressure 128/81. On exam her abdomen is soft and nontender however there is gross amount of dark red blood surrounding patient's rectum.   Lab work was obtained which demonstrate initial hemoglobin of 11.1. Patient continued to have multiple episodes of large amounts of rectal bleeding while in the emergency department. Repeat hemoglobin was 10.7 vitals remained stable. Initial plan to obtain a CT abdomen pelvis with contrast to evaluate for diverticulitis. Was informed by the nursing staff as well as the CT technicians that there was difficulty obtaining peripheral access and lost access during the CT scan. Discussed with any physician who evaluate the patient and plans to place central line for IV access. Consulted GI who recommended CT a of the abdomen pelvis to evaluate for the source of bleeding. Patient was started on Protonix here in the emergency department. We will plan to admit the patient to the hospital service for acute gastrointestinal hemorrhage. Patient continued to have active hemorrhaging from her rectum producing large amounts of blood. Therefore patient was started on blood transfusion 2 units each unit over 2 hours due to anticipation of decrease in her hemodynamic stability. Patient was frequently monitored for changes in her vitals. DISPOSITION  Admitted to the hospital service    CLINICAL IMPRESSION  1.  Gastrointestinal hemorrhage, unspecified gastrointestinal hemorrhage type           Sean Miller PA-C  12/09/21 9595

## 2021-12-10 ENCOUNTER — ANESTHESIA EVENT (OUTPATIENT)
Dept: ENDOSCOPY | Age: 81
DRG: 378 | End: 2021-12-10
Payer: MEDICARE

## 2021-12-10 ENCOUNTER — ANESTHESIA (OUTPATIENT)
Dept: ENDOSCOPY | Age: 81
DRG: 378 | End: 2021-12-10
Payer: MEDICARE

## 2021-12-10 VITALS — DIASTOLIC BLOOD PRESSURE: 58 MMHG | SYSTOLIC BLOOD PRESSURE: 117 MMHG | OXYGEN SATURATION: 100 %

## 2021-12-10 LAB
ALBUMIN SERPL-MCNC: 2.9 G/DL (ref 3.4–5)
ALP BLD-CCNC: 58 U/L (ref 40–129)
ALT SERPL-CCNC: 6 U/L (ref 10–40)
ANION GAP SERPL CALCULATED.3IONS-SCNC: 13 MMOL/L (ref 3–16)
AST SERPL-CCNC: 16 U/L (ref 15–37)
BILIRUB SERPL-MCNC: 0.6 MG/DL (ref 0–1)
BILIRUBIN DIRECT: <0.2 MG/DL (ref 0–0.3)
BILIRUBIN, INDIRECT: ABNORMAL MG/DL (ref 0–1)
BUN BLDV-MCNC: 10 MG/DL (ref 7–20)
CALCIUM SERPL-MCNC: 7.3 MG/DL (ref 8.3–10.6)
CHLORIDE BLD-SCNC: 107 MMOL/L (ref 99–110)
CO2: 22 MMOL/L (ref 21–32)
CREAT SERPL-MCNC: 0.8 MG/DL (ref 0.6–1.2)
EKG ATRIAL RATE: 87 BPM
EKG DIAGNOSIS: NORMAL
EKG P AXIS: 91 DEGREES
EKG P-R INTERVAL: 140 MS
EKG Q-T INTERVAL: 404 MS
EKG QRS DURATION: 80 MS
EKG QTC CALCULATION (BAZETT): 486 MS
EKG R AXIS: 52 DEGREES
EKG T AXIS: -6 DEGREES
EKG VENTRICULAR RATE: 87 BPM
GFR AFRICAN AMERICAN: >60
GFR NON-AFRICAN AMERICAN: >60
GLUCOSE BLD-MCNC: 107 MG/DL (ref 70–99)
HCT VFR BLD CALC: 32.8 % (ref 36–48)
HCT VFR BLD CALC: 33.3 % (ref 36–48)
HCT VFR BLD CALC: 34.1 % (ref 36–48)
HEMOGLOBIN: 10.8 G/DL (ref 12–16)
HEMOGLOBIN: 11 G/DL (ref 12–16)
HEMOGLOBIN: 11.3 G/DL (ref 12–16)
IRON SATURATION: 96 % (ref 15–50)
IRON: 182 UG/DL (ref 37–145)
MAGNESIUM: 1.2 MG/DL (ref 1.8–2.4)
POTASSIUM REFLEX MAGNESIUM: 2.7 MMOL/L (ref 3.5–5.1)
SODIUM BLD-SCNC: 142 MMOL/L (ref 136–145)
TOTAL IRON BINDING CAPACITY: 190 UG/DL (ref 260–445)
TOTAL PROTEIN: 5.3 G/DL (ref 6.4–8.2)

## 2021-12-10 PROCEDURE — 83550 IRON BINDING TEST: CPT

## 2021-12-10 PROCEDURE — 0DJD8ZZ INSPECTION OF LOWER INTESTINAL TRACT, VIA NATURAL OR ARTIFICIAL OPENING ENDOSCOPIC: ICD-10-PCS | Performed by: INTERNAL MEDICINE

## 2021-12-10 PROCEDURE — 6370000000 HC RX 637 (ALT 250 FOR IP): Performed by: HOSPITALIST

## 2021-12-10 PROCEDURE — 83540 ASSAY OF IRON: CPT

## 2021-12-10 PROCEDURE — 6370000000 HC RX 637 (ALT 250 FOR IP): Performed by: FAMILY MEDICINE

## 2021-12-10 PROCEDURE — 3700000000 HC ANESTHESIA ATTENDED CARE: Performed by: INTERNAL MEDICINE

## 2021-12-10 PROCEDURE — 83735 ASSAY OF MAGNESIUM: CPT

## 2021-12-10 PROCEDURE — 7100000000 HC PACU RECOVERY - FIRST 15 MIN: Performed by: INTERNAL MEDICINE

## 2021-12-10 PROCEDURE — 7100000001 HC PACU RECOVERY - ADDTL 15 MIN: Performed by: INTERNAL MEDICINE

## 2021-12-10 PROCEDURE — 6360000002 HC RX W HCPCS: Performed by: HOSPITALIST

## 2021-12-10 PROCEDURE — 80076 HEPATIC FUNCTION PANEL: CPT

## 2021-12-10 PROCEDURE — 85018 HEMOGLOBIN: CPT

## 2021-12-10 PROCEDURE — 36415 COLL VENOUS BLD VENIPUNCTURE: CPT

## 2021-12-10 PROCEDURE — 2060000000 HC ICU INTERMEDIATE R&B

## 2021-12-10 PROCEDURE — 85014 HEMATOCRIT: CPT

## 2021-12-10 PROCEDURE — 2580000003 HC RX 258: Performed by: HOSPITALIST

## 2021-12-10 PROCEDURE — 94760 N-INVAS EAR/PLS OXIMETRY 1: CPT

## 2021-12-10 PROCEDURE — 2500000003 HC RX 250 WO HCPCS: Performed by: NURSE ANESTHETIST, CERTIFIED REGISTERED

## 2021-12-10 PROCEDURE — 6360000002 HC RX W HCPCS: Performed by: NURSE ANESTHETIST, CERTIFIED REGISTERED

## 2021-12-10 PROCEDURE — 93010 ELECTROCARDIOGRAM REPORT: CPT | Performed by: INTERNAL MEDICINE

## 2021-12-10 PROCEDURE — 3700000001 HC ADD 15 MINUTES (ANESTHESIA): Performed by: INTERNAL MEDICINE

## 2021-12-10 PROCEDURE — 3609027000 HC COLONOSCOPY: Performed by: INTERNAL MEDICINE

## 2021-12-10 PROCEDURE — C9113 INJ PANTOPRAZOLE SODIUM, VIA: HCPCS | Performed by: HOSPITALIST

## 2021-12-10 PROCEDURE — 80048 BASIC METABOLIC PNL TOTAL CA: CPT

## 2021-12-10 PROCEDURE — 6360000002 HC RX W HCPCS: Performed by: FAMILY MEDICINE

## 2021-12-10 PROCEDURE — 2709999900 HC NON-CHARGEABLE SUPPLY: Performed by: INTERNAL MEDICINE

## 2021-12-10 RX ORDER — LABETALOL HYDROCHLORIDE 5 MG/ML
INJECTION, SOLUTION INTRAVENOUS PRN
Status: DISCONTINUED | OUTPATIENT
Start: 2021-12-10 | End: 2021-12-10 | Stop reason: SDUPTHER

## 2021-12-10 RX ORDER — SODIUM CHLORIDE 9 MG/ML
10 INJECTION INTRAVENOUS DAILY
Status: DISCONTINUED | OUTPATIENT
Start: 2021-12-11 | End: 2021-12-11 | Stop reason: HOSPADM

## 2021-12-10 RX ORDER — PROPOFOL 10 MG/ML
INJECTION, EMULSION INTRAVENOUS PRN
Status: DISCONTINUED | OUTPATIENT
Start: 2021-12-10 | End: 2021-12-10 | Stop reason: SDUPTHER

## 2021-12-10 RX ORDER — POTASSIUM CHLORIDE 29.8 MG/ML
20 INJECTION INTRAVENOUS PRN
Status: DISCONTINUED | OUTPATIENT
Start: 2021-12-10 | End: 2021-12-11 | Stop reason: HOSPADM

## 2021-12-10 RX ORDER — POTASSIUM CHLORIDE 20 MEQ/1
40 TABLET, EXTENDED RELEASE ORAL ONCE
Status: COMPLETED | OUTPATIENT
Start: 2021-12-10 | End: 2021-12-10

## 2021-12-10 RX ORDER — LIDOCAINE HYDROCHLORIDE 20 MG/ML
INJECTION, SOLUTION EPIDURAL; INFILTRATION; INTRACAUDAL; PERINEURAL PRN
Status: DISCONTINUED | OUTPATIENT
Start: 2021-12-10 | End: 2021-12-10 | Stop reason: SDUPTHER

## 2021-12-10 RX ORDER — OXYCODONE HYDROCHLORIDE 5 MG/1
5 TABLET ORAL EVERY 4 HOURS PRN
Status: DISCONTINUED | OUTPATIENT
Start: 2021-12-10 | End: 2021-12-11 | Stop reason: HOSPADM

## 2021-12-10 RX ORDER — PANTOPRAZOLE SODIUM 40 MG/10ML
40 INJECTION, POWDER, LYOPHILIZED, FOR SOLUTION INTRAVENOUS DAILY
Status: DISCONTINUED | OUTPATIENT
Start: 2021-12-11 | End: 2021-12-11 | Stop reason: HOSPADM

## 2021-12-10 RX ORDER — OXYCODONE HYDROCHLORIDE 10 MG/1
10 TABLET ORAL EVERY 4 HOURS PRN
Status: DISCONTINUED | OUTPATIENT
Start: 2021-12-10 | End: 2021-12-11 | Stop reason: HOSPADM

## 2021-12-10 RX ORDER — PROPOFOL 10 MG/ML
INJECTION, EMULSION INTRAVENOUS CONTINUOUS PRN
Status: DISCONTINUED | OUTPATIENT
Start: 2021-12-10 | End: 2021-12-10 | Stop reason: SDUPTHER

## 2021-12-10 RX ADMIN — PROPOFOL 140 MCG/KG/MIN: 10 INJECTION, EMULSION INTRAVENOUS at 13:40

## 2021-12-10 RX ADMIN — ACETAMINOPHEN 650 MG: 325 TABLET ORAL at 05:30

## 2021-12-10 RX ADMIN — OXYCODONE HYDROCHLORIDE 10 MG: 10 TABLET ORAL at 21:52

## 2021-12-10 RX ADMIN — PROPOFOL 50 MG: 10 INJECTION, EMULSION INTRAVENOUS at 13:40

## 2021-12-10 RX ADMIN — HYDRALAZINE HYDROCHLORIDE 25 MG: 25 TABLET, FILM COATED ORAL at 01:13

## 2021-12-10 RX ADMIN — SODIUM CHLORIDE 8 MG/HR: 9 INJECTION, SOLUTION INTRAVENOUS at 03:09

## 2021-12-10 RX ADMIN — SODIUM CHLORIDE: 9 INJECTION, SOLUTION INTRAVENOUS at 02:29

## 2021-12-10 RX ADMIN — OXYCODONE 5 MG: 5 TABLET ORAL at 17:40

## 2021-12-10 RX ADMIN — POTASSIUM CHLORIDE 20 MEQ: 29.8 INJECTION, SOLUTION INTRAVENOUS at 22:01

## 2021-12-10 RX ADMIN — ATORVASTATIN CALCIUM 10 MG: 10 TABLET, FILM COATED ORAL at 18:47

## 2021-12-10 RX ADMIN — POTASSIUM CHLORIDE 20 MEQ: 29.8 INJECTION, SOLUTION INTRAVENOUS at 20:35

## 2021-12-10 RX ADMIN — Medication 3 MG: at 22:05

## 2021-12-10 RX ADMIN — LABETALOL HYDROCHLORIDE 2.5 MG: 5 INJECTION, SOLUTION INTRAVENOUS at 13:50

## 2021-12-10 RX ADMIN — ACETAMINOPHEN 650 MG: 325 TABLET ORAL at 16:21

## 2021-12-10 RX ADMIN — HYDRALAZINE HYDROCHLORIDE 25 MG: 25 TABLET, FILM COATED ORAL at 15:06

## 2021-12-10 RX ADMIN — LIDOCAINE HYDROCHLORIDE 50 MG: 20 INJECTION, SOLUTION EPIDURAL; INFILTRATION; INTRACAUDAL; PERINEURAL at 13:40

## 2021-12-10 RX ADMIN — HYDRALAZINE HYDROCHLORIDE 25 MG: 25 TABLET, FILM COATED ORAL at 05:30

## 2021-12-10 RX ADMIN — LISINOPRIL 20 MG: 20 TABLET ORAL at 09:24

## 2021-12-10 RX ADMIN — NIFEDIPINE 30 MG: 30 TABLET, FILM COATED, EXTENDED RELEASE ORAL at 09:24

## 2021-12-10 RX ADMIN — HYDRALAZINE HYDROCHLORIDE 25 MG: 25 TABLET, FILM COATED ORAL at 21:51

## 2021-12-10 RX ADMIN — POTASSIUM CHLORIDE 40 MEQ: 1500 TABLET, EXTENDED RELEASE ORAL at 11:35

## 2021-12-10 RX ADMIN — SODIUM CHLORIDE, PRESERVATIVE FREE 10 ML: 5 INJECTION INTRAVENOUS at 20:33

## 2021-12-10 RX ADMIN — OXYBUTYNIN CHLORIDE 5 MG: 5 TABLET ORAL at 16:21

## 2021-12-10 RX ADMIN — SODIUM CHLORIDE: 9 INJECTION, SOLUTION INTRAVENOUS at 17:40

## 2021-12-10 ASSESSMENT — LIFESTYLE VARIABLES: SMOKING_STATUS: 0

## 2021-12-10 ASSESSMENT — PULMONARY FUNCTION TESTS
PIF_VALUE: 1

## 2021-12-10 ASSESSMENT — PAIN SCALES - GENERAL
PAINLEVEL_OUTOF10: 0
PAINLEVEL_OUTOF10: 0
PAINLEVEL_OUTOF10: 5
PAINLEVEL_OUTOF10: 6
PAINLEVEL_OUTOF10: 0
PAINLEVEL_OUTOF10: 0
PAINLEVEL_OUTOF10: 8
PAINLEVEL_OUTOF10: 8

## 2021-12-10 ASSESSMENT — PAIN - FUNCTIONAL ASSESSMENT
PAIN_FUNCTIONAL_ASSESSMENT: 0-10
PAIN_FUNCTIONAL_ASSESSMENT: ACTIVITIES ARE NOT PREVENTED

## 2021-12-10 ASSESSMENT — PAIN DESCRIPTION - ONSET: ONSET: GRADUAL

## 2021-12-10 ASSESSMENT — PAIN DESCRIPTION - PROGRESSION: CLINICAL_PROGRESSION: NOT CHANGED

## 2021-12-10 ASSESSMENT — PAIN DESCRIPTION - LOCATION: LOCATION: HAND

## 2021-12-10 ASSESSMENT — PAIN DESCRIPTION - ORIENTATION: ORIENTATION: RIGHT;LEFT

## 2021-12-10 ASSESSMENT — PAIN DESCRIPTION - FREQUENCY: FREQUENCY: CONTINUOUS

## 2021-12-10 ASSESSMENT — PAIN DESCRIPTION - DESCRIPTORS: DESCRIPTORS: ACHING

## 2021-12-10 ASSESSMENT — PAIN DESCRIPTION - PAIN TYPE: TYPE: ACUTE PAIN

## 2021-12-10 NOTE — ANESTHESIA PRE PROCEDURE
Department of Anesthesiology  Preprocedure Note       Name:  Jessica Delgadillo   Age:  79 y.o.  :  1940                                          MRN:  [id number]         Date:  12/10/2021      Surgeon: Soniya Signs):  Marychuy Ruiz MD    Procedure: COLONOSCOPY DIAGNOSTIC (N/A )    Medications prior to admission:   Prior to Admission medications    Medication Sig Start Date End Date Taking? Authorizing Provider   hydroCHLOROthiazide (HYDRODIURIL) 25 MG tablet Take 25 mg by mouth daily 21   Historical Provider, MD   lisinopril (PRINIVIL;ZESTRIL) 20 MG tablet Take 20 mg by mouth daily 21  Historical Provider, MD   omeprazole (PRILOSEC) 40 MG delayed release capsule Take 40 mg by mouth daily 10/25/21   Historical Provider, MD   traMADol (ULTRAM) 50 MG tablet Take 50 mg by mouth 3 times daily as needed for Pain. 21   Historical Provider, MD   melatonin 3 MG TABS tablet Take 1 tablet by mouth nightly as needed (Sleep) 21   Chirag Walden MD   docusate sodium (COLACE) 100 MG capsule Take 100 mg by mouth 2 times daily as needed for Constipation     Historical Provider, MD   calcium-vitamin D (OSCAL-500) 500-200 MG-UNIT per tablet Take 1 tablet by mouth 2 times daily    Historical Provider, MD   traZODone (DESYREL) 50 MG tablet Take 50 mg by mouth nightly as needed for Sleep    Historical Provider, MD   oxybutynin (DITROPAN) 5 MG tablet Take 5 mg by mouth 2 times daily     Historical Provider, MD   simvastatin (ZOCOR) 40 MG tablet Take 40 mg by mouth nightly 19   Historical Provider, MD   NIFEdipine (ADALAT CC) 30 MG extended release tablet Take 1 tablet by mouth daily 19   AMRITA Perez - NP       Current medications:    No current facility-administered medications for this visit. No current outpatient medications on file.      Facility-Administered Medications Ordered in Other Visits   Medication Dose Route Frequency Provider Last Rate Last Admin    lidocaine PF 1 % injection 5 mL  5 mL IntraDERmal Once Katerina Guzman PA-C        0.9 % sodium chloride infusion   IntraVENous PRN Katerina Guzman PA-C        docusate sodium (COLACE) capsule 100 mg  100 mg Oral Daily Ty Simons MD        melatonin tablet 3 mg  3 mg Oral Nightly PRN Ty Simons MD        [facility]) tablet 5 mg  5 mg Oral BID PRN Ty Simons MD        atorvastatin (LIPITOR) tablet 10 mg  10 mg Oral Daily Ty Simons MD   10 mg at 12/09/21 1830    traZODone (DESYREL) tablet 50 mg  50 mg Oral Nightly PRN yT Simons MD        0.9 % sodium chloride infusion   IntraVENous Continuous Ty Simons  mL/hr at 12/10/21 1032 Rate Verify at 12/10/21 1032    sodium chloride flush 0.9 % injection 5-40 mL  5-40 mL IntraVENous 2 times per day Ty Simons MD   10 mL at 12/09/21 2233    sodium chloride flush 0.9 % injection 5-40 mL  5-40 mL IntraVENous PRN Ty Simons MD        0.9 % sodium chloride infusion  25 mL IntraVENous PRN Ty Simons MD        ondansetron (ZOFRAN-ODT) disintegrating tablet 4 mg  4 mg Oral Q8H PRN Ty Simons MD        Or    ondansetron [facility]) injection 4 mg  4 mg IntraVENous Q6H PRN Ty Simons MD        acetaminophen (TYLENOL) tablet 650 mg  650 mg Oral Q6H PRN Ty Simons MD   650 mg at 12/10/21 0530    Or    acetaminophen (TYLENOL) suppository 650 mg  650 mg Rectal Q6H PRN Ty Simons MD        pantoprazole (PROTONIX) 80 mg in sodium chloride 0.9 % 100 mL infusion  8 mg/hr IntraVENous Continuous Ty Simons MD 10 mL/hr at 12/10/21 1032 8 mg/hr at 12/10/21 1032    polyethylene glycol (GoLYTELY) solution 4,000 mL  4,000 mL Per NG tube Once DEAN Allan        lisinopril (PRINIVIL;ZESTRIL) tablet 20 mg  20 mg Oral Daily Ty Simons MD   20 mg at 12/10/21 0924    hydrALAZINE (APRESOLINE) tablet 25 mg  25 mg Oral 3 times per day Christine Diallo MD   25 mg at 12/10/21 0530    NIFEdipine (PROCARDIA XL) extended release tablet 30 mg  30 mg Oral Daily Christine Diallo MD   30 mg at 12/10/21 6160       Allergies: Allergies   Allergen Reactions    Pcn [Penicillins] Hives and Itching     itchy       Problem List:    Patient Active Problem List   Diagnosis Code    Age-related osteoporosis with current pathol fracture of vertebra ([facility] M80.08XA    Acute right-sided low back pain with sciatica M54.40    History of CVA with residual deficit I69.30    Tobacco abuse Z72.0    Multiple closed fractures of ribs S22.49XA    Essential hypertension, benign I10    Generalized weakness R53.1    Hyperlipidemia E78.5    Fall against object W18.00XA    Hypokalemia E87.6    Severe protein-calorie malnutrition Alvaro Manges: less than 60% of standard weight) ([facility] E43    Hypertensive urgency I16.0    Lower GI bleed K92.2    Sepsis ([facility] A41.9    GI bleed K92.2       Past Medical History:        Diagnosis Date    CARROLL positive     Arthritis     Bleeding stomach ulcer     Diverticulitis     GERD (gastroesophageal reflux disease)     Hypertension     Stroke ([facility] 2002    left weakness     Tobacco abuse        Past Surgical History:        Procedure Laterality Date    CHOLECYSTECTOMY  2014    COLONOSCOPY  2011    COLONOSCOPY N/A 12/31/2019    COLONOSCOPY DIAGNOSTIC performed by Thomas Mccoy DO at [address] [facility], TOTAL ABDOMINAL  1970's    JACQUELYN/BSO for 'tumor'       Social History:    Social History     Tobacco Use    Smoking status: Current Every Day Smoker     Packs/day: 0.25     Years: 50.00     Pack years: 12.50     Types: Cigarettes    Smokeless tobacco: Never Used   Substance Use Topics    Alcohol use: No                                Ready to quit: Not Answered  Counseling given: Not Answered      Vital Signs (Current): There were no vitals filed for this visit. BP Readings from Last 3 Encounters:   12/10/21 (!) 184/90   01/26/21 133/79   01/01/20 (!) 173/87       NPO Status:  >8h                                                                               BMI:   Wt Readings from Last 3 Encounters:   12/10/21 117 lb 4.6 oz (53.2 kg)   01/26/21 100 lb 5 oz (45.5 kg)   01/01/20 110 lb 3.7 oz (50 kg)     There is no height or weight on file to calculate BMI.    CBC:   Lab Results   Component Value Date    WBC 5.6 12/09/2021    RBC 4.33 12/09/2021    HGB 11.3 12/10/2021    HCT 34.1 12/10/2021    MCV 81.6 12/09/2021    RDW 15.6 12/09/2021     12/09/2021       CMP:   Lab Results   Component Value Date     12/09/2021    K 3.4 12/09/2021     12/09/2021    CO2 24 12/09/2021    BUN 22 12/09/2021    CREATININE 1.0 12/09/2021    GFRAA >60 12/09/2021    GFRAA >60 05/16/2013    AGRATIO 1.4 12/09/2021    LABGLOM 53 12/09/2021    GLUCOSE 134 12/09/2021    PROT 5.3 12/10/2021    PROT 8.2 10/04/2011    CALCIUM 9.2 12/09/2021    BILITOT 0.6 12/10/2021    ALKPHOS 58 12/10/2021    AST 16 12/10/2021    ALT 6 12/10/2021       POC Tests: No results for input(s): POCGLU, POCNA, POCK, POCCL, POCBUN, POCHEMO, POCHCT in the last 72 hours.     Coags:   Lab Results   Component Value Date    PROTIME 13.2 12/09/2021    PROTIME 11.4 12/02/2009    INR 1.16 12/09/2021    APTT 28.7 12/09/2021       HCG (If Applicable): No results found for: PREGTESTUR, PREGSERUM, HCG, HCGQUANT     ABGs:   Lab Results   Component Value Date    PHART 7.462 01/22/2014    PO2ART 70.1 01/22/2014    LDK9DWV 33.2 01/22/2014    ZFG4AXZ 23.4 01/22/2014    BEART 0.6 01/22/2014    V8PHHEGI 95.0 01/22/2014        Type & Screen (If Applicable):  Lab Results   Component Value Date    LABABO AB 11/11/2010    [address] Positive 11/11/2010       Anesthesia Evaluation  Patient summary reviewed no history of anesthetic complications:   Airway: Mallampati: II  TM distance: >3 FB   Neck ROM: full  Mouth opening: > = 3 FB Dental:    (+) edentulous      Pulmonary: breath sounds clear to auscultation      (-) COPD, asthma, sleep apnea and not a current smoker                           Cardiovascular:  Exercise tolerance: good (>4 METS),   (+) hypertension:, hyperlipidemia    (-) past MI and CABG/stent        Rate: normal                    Neuro/Psych:   (+) CVA:,    (-) seizures, TIA, psychiatric history and depression/anxiety            GI/Hepatic/Renal:   (+) GERD:, PUD,          ROS comment: H/o GI bleed. Endo/Other:        (-) diabetes mellitus, hypothyroidism               Abdominal:             Vascular:     - DVT and PE. Other Findings:               Anesthesia Plan      MAC     ASA 3       Induction: intravenous. Anesthetic plan and risks discussed with patient. Plan discussed with CRNA.                   Re Patel MD   12/10/2021

## 2021-12-10 NOTE — PLAN OF CARE
Problem: Falls - Risk of:  Goal: Will remain free from falls  Description: Will remain free from falls  12/10/2021 0316 by Jn Gr RN  Outcome: Ongoing     Problem: Falls - Risk of:  Goal: Absence of physical injury  Description: Absence of physical injury  12/10/2021 0316 by Jn Gr RN  Outcome: Ongoing     Problem: Skin Integrity:  Goal: Will show no infection signs and symptoms  Description: Will show no infection signs and symptoms  12/10/2021 0316 by Jn Gr RN  Outcome: Ongoing     Problem: Skin Integrity:  Goal: Absence of new skin breakdown  Description: Absence of new skin breakdown  12/10/2021 0316 by Jn Gr RN  Outcome: Ongoing     Problem: Infection:  Goal: Will remain free from infection  Description: Will remain free from infection  12/10/2021 0316 by Jn Gr RN  Outcome: Ongoing

## 2021-12-10 NOTE — PROGRESS NOTES
Have asked pt multiple times to see if we can place NG tube for the bowel prep, pt has denied each time. She has drank 640mL of the GoLytely.      Electronically signed by Sofy Luz RN on 12/9/2021 at 11:51 PM

## 2021-12-10 NOTE — PLAN OF CARE
Problem: Falls - Risk of:  Goal: Will remain free from falls  Description: Will remain free from falls  Outcome: Ongoing     Problem: Falls - Risk of:  Goal: Absence of physical injury  Description: Absence of physical injury  Outcome: Ongoing     Problem: Skin Integrity:  Goal: Will show no infection signs and symptoms  Description: Will show no infection signs and symptoms  Outcome: Ongoing     Problem: Skin Integrity:  Goal: Absence of new skin breakdown  Description: Absence of new skin breakdown  Outcome: Ongoing     Problem: Infection:  Goal: Will remain free from infection  Description: Will remain free from infection  Outcome: Ongoing     Problem: Safety:  Goal: Free from accidental physical injury  Description: Free from accidental physical injury  Outcome: Ongoing     Problem: Safety:  Goal: Free from intentional harm  Description: Free from intentional harm  Outcome: Ongoing     Problem: Daily Care:  Goal: Daily care needs are met  Description: Daily care needs are met  Outcome: Ongoing     Problem: Pain:  Goal: Patient's pain/discomfort is manageable  Description: Patient's pain/discomfort is manageable  Outcome: Ongoing     Problem: Skin Integrity:  Goal: Skin integrity will stabilize  Description: Skin integrity will stabilize  Outcome: Ongoing     Problem: Discharge Planning:  Goal: Patients continuum of care needs are met  Description: Patients continuum of care needs are met  Outcome: Ongoing

## 2021-12-10 NOTE — PROGRESS NOTES
PACU Transfer Note    Vitals:    12/10/21 1430   BP: (!) 176/69   Pulse: 69   Resp: 14   Temp: 97.5 °F (36.4 °C)   SpO2: 96%   Hypertensive pre procedure    In: 2147.1 [I.V.:2147.1]  Out: 2200 [Urine:1500]    Pain assessment:  none  Pain Level: 0    Report given to Receiving unit Natalie GARIBAY.    12/10/2021 2:35 PM

## 2021-12-10 NOTE — ANESTHESIA POSTPROCEDURE EVALUATION
Department of Anesthesiology  Postprocedure Note    Patient: Lucretia Kocher  MRN: [id number]  YOB: 1940  Date of evaluation: 12/10/2021  Time:  5:07 PM     Procedure Summary     Date: 12/10/21 Room / Location: [address] [place] [address]    Anesthesia Start: 5733 Anesthesia Stop: 3833    Procedure: COLONOSCOPY DIAGNOSTIC (N/A ) Diagnosis: (Rectal bleeding)    Surgeons: Luda Cheng MD Responsible Provider: Talmadge Galeazzi, MD    Anesthesia Type: MAC ASA Status: 3          Anesthesia Type: MAC    Milan Phase I: Milan Score: 5    Milan Phase II:      Last vitals: Reviewed and per EMR flowsheets.        Anesthesia Post Evaluation    Patient location during evaluation: PACU  Patient participation: complete - patient participated  Level of consciousness: awake and alert  Pain score: 0  Airway patency: patent  Nausea & Vomiting: no nausea and no vomiting  Complications: no  Cardiovascular status: blood pressure returned to baseline  Respiratory status: acceptable  Hydration status: euvolemic

## 2021-12-10 NOTE — PROGRESS NOTES
Hospitalist Progress Note    CC: <principal problem not specified>      Admit date: 12/9/2021  Days in hospital:  1    Subjective/interval history: Pt S/E. Pt returned from edg/c scope. C/o rt leg pain up to her hip. She is asking for her tramadol she takes at home, and a sleeping pill. O2 status: room air    ROS:   Pertinent items are noted in HPI. Objective:    BP (!) 162/88   Pulse 87   Temp 98.4 °F (36.9 °C) (Oral)   Resp 16   Wt 117 lb 4.6 oz (53.2 kg)   SpO2 98%   BMI 20.78 kg/m²     Gen: alert, NAD, appears comfortable  HEENT: NC/AT, moist mucous membranes, no oropharyngeal erythema or exudate  Neck: supple, trachea midline, no anterior cervical or SC LAD  Heart: Normal s1/s2, RRR, no murmurs, gallops, or rubs. Lungs: clear bilaterally, no wheezing, no rales, no rhonchi, no use of accessory muscles  Abd: bowel sounds present, soft, nontender, nondistended, no masses  Extrem: No clubbing, cyanosis, no edema. No calf tenderness. bl legs warm, pulses intact x 4 ext. Skin: no rashes or lesions  Psych: A & O x3, affect appropriate  Neuro: grossly intact, moves all four extremities spontaneously.   Cap refill: +2 sec    Medications:  Scheduled Meds:   lidocaine 1 % injection  5 mL IntraDERmal Once    docusate sodium  100 mg Oral Daily    atorvastatin  10 mg Oral Daily    sodium chloride flush  5-40 mL IntraVENous 2 times per day    polyethylene glycol  4,000 mL Per NG tube Once    lisinopril  20 mg Oral Daily    hydrALAZINE  25 mg Oral 3 times per day    NIFEdipine  30 mg Oral Daily       PRN Meds:  sodium chloride, melatonin, oxybutynin, traZODone, sodium chloride flush, sodium chloride, ondansetron **OR** ondansetron, acetaminophen **OR** acetaminophen    IV:   sodium chloride      sodium chloride 125 mL/hr at 12/10/21 1032    sodium chloride      pantoprazole 8 mg/hr (12/10/21 1032)         Intake/Output Summary (Last 24 hours) at 12/10/2021 1102  Last data filed at 12/10/2021 1032  Gross per 24 hour   Intake 4784.14 ml   Output 1650 ml   Net 3134.14 ml       Results:  CBC:   Recent Labs     12/09/21  1115 12/09/21  1115 12/09/21  1510 12/09/21  2230 12/10/21  0400   WBC 5.6  --   --   --   --    HGB 11.1*   < > 10.7* 9.3* 10.8*   HCT 35.3*   < > 33.4* 27.8* 33.3*   MCV 81.6  --   --   --   --      --   --   --   --     < > = values in this interval not displayed. BMP:   Recent Labs     12/09/21  1115      K 3.4*      CO2 24   BUN 22*   CREATININE 1.0     Mag: No results for input(s): MAG in the last 72 hours. Phos:   Lab Results   Component Value Date    PHOS 2.9 01/26/2021     No components found for: GLU    LIVER PROFILE:   Recent Labs     12/09/21  1115 12/10/21  0633   AST 18 16   ALT 8* 6*   BILIDIR  --  <0.2   BILITOT 0.3 0.6   ALKPHOS 78 58     PT/INR:   Recent Labs     12/09/21  1626   PROTIME 13.2*   INR 1.16*     APTT:   Recent Labs     12/09/21  1626   APTT 28.7     UA:No results for input(s): NITRITE, COLORU, PHUR, LABCAST, WBCUA, RBCUA, MUCUS, TRICHOMONAS, YEAST, BACTERIA, CLARITYU, SPECGRAV, LEUKOCYTESUR, UROBILINOGEN, BILIRUBINUR, BLOODU, GLUCOSEU, AMORPHOUS in the last 72 hours. Invalid input(s): Eliezer Brochure input(s): ABG  Lab Results   Component Value Date    CALCIUM 9.2 12/09/2021    PHOS 2.9 01/26/2021       Assessment:    Active Problems:    GI bleed  Resolved Problems:    * No resolved hospital problems. [facility] course: 79 y. o.female with medical history significant for peptic ulcer disease. Patient presented to the emergency room with rectal bleeding with large amounts of dark red blood in the stools. Patient does report mild cramping in the lower abdomen.   No history of for hematuria no history of hematemesis    Plan:  GIB  Acute blood loss anemia - likely diverticular bleed  - hgb  - given 1 unit 12/9  - follow cbc  - avoid nsaids, hold ac  - ppi iv  - scds for dvt prophylaxis   - transfuse for hgb < 7.0  - Fe mildly elevated  - consulted GI, s/p egd/c-scope today without active bleeding    Chronic conditions - continue home meds unless otherwise stated  htn  Cerebral atherosclerosis   Pain - cont home tramadol    Code status:  full  DVT prophylaxis: [] Lovenox  [] SQ Heparin  [] SCDs because of  [] warfarin/oral direct thrombin inhibitor [] Encourage ambulation      Disposition:  [] Home [] Rehab [] Psych [] SNF  [] LTAC  [] Transfer to ICU  [] Transfer to PCU [] Other: dc tomorrow if hgb stable    Electronically signed by Francisco Lang DO on 12/10/2021 at 11:02 AM

## 2021-12-10 NOTE — PROGRESS NOTES
Patient admitted to PACU # 8 from [facility] at 1404 post COLONOSCOPY DIAGNOSTIC  per Dr. Pj Pickard. Attached to PACU monitoring system and report received from anesthesia provider. Patient was reported to be hemodynamically stable during procedure. Patient sleepy from anesthesia.  Continue to monitor

## 2021-12-10 NOTE — H&P
Hospitalist  History and Physical    Patient:  Fartun Sun  MRN: [id number]  PCP: Kelli Abdullahi    CHIEF COMPLAINT: Abdominal Pain      HISTORY OF PRESENT ILLNESS:   The patient Fartun Sun is a 79 y. o.female with medical history significant for peptic ulcer disease. Medical history is also significant for CARROLL positive arthritis, GERD and hypertension. Patient presented to the emergency room with rectal bleeding that began today. Patient reports large amounts of dark red blood in the stools. Patient does report mild cramping in the lower abdomen. No history of for hematuria no history of hematemesis      Past Medical History:        Diagnosis Date    CARROLL positive     Arthritis     Bleeding stomach ulcer     Diverticulitis     GERD (gastroesophageal reflux disease)     Hypertension     Stroke ([facility] [address] 2002    left weakness     Tobacco abuse        Past Surgical History:        Procedure Laterality Date    CHOLECYSTECTOMY  2014    COLONOSCOPY  2011    COLONOSCOPY N/A 12/31/2019    COLONOSCOPY DIAGNOSTIC performed by Joslyn Christian DO at [facility], TOTAL ABDOMINAL  1970's    JACQUELYN/BSO for 'tumor'       Medications Prior to Admission:    Prior to Admission medications    Medication Sig Start Date End Date Taking? Authorizing Provider   hydroCHLOROthiazide (HYDRODIURIL) 25 MG tablet Take 25 mg by mouth daily 11/24/21  Yes Historical Provider, MD   lisinopril (PRINIVIL;ZESTRIL) 20 MG tablet Take 20 mg by mouth daily 11/26/21 11/21/22 Yes Historical Provider, MD   omeprazole (PRILOSEC) 40 MG delayed release capsule Take 40 mg by mouth daily 10/25/21  Yes Historical Provider, MD   traMADol (ULTRAM) 50 MG tablet Take 50 mg by mouth 3 times daily as needed for Pain.   11/29/21  Yes Historical Provider, MD   melatonin 3 MG TABS tablet Take 1 tablet by mouth nightly as needed (Sleep) 1/26/21  Yes Juan José Barrientos MD   docusate sodium (COLACE) 100 MG capsule Take 100 mg by mouth 2 times daily as needed for Constipation    Yes Historical Provider, MD   calcium-vitamin D (OSCAL-500) 500-200 MG-UNIT per tablet Take 1 tablet by mouth 2 times daily   Yes Historical Provider, MD   traZODone (DESYREL) 50 MG tablet Take 50 mg by mouth nightly as needed for Sleep   Yes Historical Provider, MD   oxybutynin (DITROPAN) 5 MG tablet Take 5 mg by mouth 2 times daily    Yes Historical Provider, MD   simvastatin (ZOCOR) 40 MG tablet Take 40 mg by mouth nightly 12/12/19  Yes Historical Provider, MD   NIFEdipine (ADALAT CC) 30 MG extended release tablet Take 1 tablet by mouth daily 4/25/19  Yes Osvaldo Rm, APRN - NP       Allergies:  Pcn [penicillins]      Social History:   TOBACCO:   reports that she has been smoking cigarettes. She has a 12.50 pack-year smoking history. She has never used smokeless tobacco.  ETOH:   reports no history of alcohol use. Family History:      Problem Relation Age of Onset    High Blood Pressure Mother     High Blood Pressure Father     High Blood Pressure Daughter     Cancer Son            REVIEW OF SYSTEMS:     patients reported symptoms are in BOLD all other symptoms are negative. CONSTITUTIONAL:      fatigue, fever, chills or night sweats, recent weight gain, recent wt loss, insomnia,  General weakness, poor appetite, muscle aches and pains    HEAD: headache, dizziness    EYES:      blurriness,  double vision, dryness,  discharge, irritation,diplopia    EARS:      hearing loss, vertigo, ear discharge,  Earache. Ringing in the ears. NOSE:      Rhinorrhea, sneezing, epistaxis.  Discharge, sinusitis,     MOUTH/THROAT:         sore throat, mouth ulcers, Hoarseness    RESPIRATORY:        Shortness of breath, wheezing,  cough, sputum, hemoptysis, obstructive sleep apnea,    CARDIOVASCULAR :      chest pain, palpitations, dyspnea on exercise, Lower extrimity edema (swelling),     GASTROINTESTINAL:       Dysphagia, Poor appetite, Nausea, Vomiting, diarrhea, heartburn, abdominal pain. Blood in the stools, hematemesis. Pain with swallowing, constipation    GENITOURINARY:       Urinary frequency, hesitancy,  urgency, Dysuria, hematuria,  Urinary Incontinence. Urinary Retention. GYNECOLOGICAL: vaginal bleeding , vaginal discharge, menopause    MUSCULOSKELETAL:       joint swelling or stiffness, joint pain, muscle pain, balance problems, low back pain. NEUROLOGICAL:      Gait problems. Tremor. Dizziness. Pain and paresthesias, weakness in extremities. Seizures, memory loss    PSYCHLOGICAL:        Anxiety, depression    SKIN :      Rashes ulcers, skin color changes, easy bruisability, lymphadenopathy      Physical Exam:      Vitals: BP (!) 154/70   Pulse 53   Temp 98.5 °F (36.9 °C) (Oral)   Resp 18   SpO2 91%     Gen:          Alert and oriented x3  Eyes: PERRL. No sclera icterus. No conjunctival injection. ENT: No discharge. Pharynx clear. External appearance of ears and nose normal.  Neck: Trachea midline. No obvious mass. Resp: No accessory muscle use. No crackles. No wheezes. No rhonchi. CV: Regular rate. Regular rhythm. No murmur or rub. No edema. GI: Non-tender. Non-distended. No hernia. Skin: Warm, dry, normal texture and turgor. Lymph: No cervical LAD. No supraclavicular LAD. M/S: / Ext. No cyanosis. No clubbing. No joint deformity. Neuro: Moves all four extremities. CN 2-12 tested, no deficits noted. Peripheral pulses and capillary refill is intact. CBC:   Recent Labs     12/09/21  1115 12/09/21  1510   WBC 5.6  --    HGB 11.1* 10.7*     --      BMP:    Recent Labs     12/09/21  1115      K 3.4*      CO2 24   BUN 22*   CREATININE 1.0   GLUCOSE 134*     Hepatic:   Recent Labs     12/09/21  1115   AST 18   ALT 8*   BILITOT 0.3   ALKPHOS 78     Troponin:   Recent Labs     12/09/21  1626   TROPONINI <0.01     BNP: No results for input(s): BNP in the last 72 hours.   INR:   Recent Labs

## 2021-12-10 NOTE — PROGRESS NOTES
Pt has still denied NG placement for the bowel prep. Educated on importance of bowel prep.      Electronically signed by Gonzalo Borja RN on 12/10/2021 at 4:02 AM

## 2021-12-10 NOTE — H&P
Pre-operative History and Physical    Patient: Juliana Camargo  : 1940  Acct#:     HISTORY OF PRESENT ILLNESS:    The patient is a 79 y.o. female who presents with hematochezia for colonoscopy. Past Medical History:        Diagnosis Date    CARROLL positive     Arthritis     Bleeding stomach ulcer     Diverticulitis     GERD (gastroesophageal reflux disease)     Hypertension     Stroke ([address] 2002    left weakness     Tobacco abuse       Past Surgical History:        Procedure Laterality Date    CHOLECYSTECTOMY      COLONOSCOPY      COLONOSCOPY N/A 2019    COLONOSCOPY DIAGNOSTIC performed by Thomas Mccoy DO at [address], TOTAL ABDOMINAL  1970's    JACQUELYN/BSO for 'tumor'      Medications Prior to Admission:   No current facility-administered medications on file prior to encounter. Current Outpatient Medications on File Prior to Encounter   Medication Sig Dispense Refill    hydroCHLOROthiazide (HYDRODIURIL) 25 MG tablet Take 25 mg by mouth daily      lisinopril (PRINIVIL;ZESTRIL) 20 MG tablet Take 20 mg by mouth daily      omeprazole (PRILOSEC) 40 MG delayed release capsule Take 40 mg by mouth daily      traMADol (ULTRAM) 50 MG tablet Take 50 mg by mouth 3 times daily as needed for Pain.        melatonin 3 MG TABS tablet Take 1 tablet by mouth nightly as needed (Sleep) 30 tablet 3    docusate sodium (COLACE) 100 MG capsule Take 100 mg by mouth 2 times daily as needed for Constipation       calcium-vitamin D (OSCAL-500) 500-200 MG-UNIT per tablet Take 1 tablet by mouth 2 times daily      traZODone (DESYREL) 50 MG tablet Take 50 mg by mouth nightly as needed for Sleep      oxybutynin (DITROPAN) 5 MG tablet Take 5 mg by mouth 2 times daily       simvastatin (ZOCOR) 40 MG tablet Take 40 mg by mouth nightly      NIFEdipine (ADALAT CC) 30 MG extended release tablet Take 1 tablet by mouth daily 30 tablet 0        Allergies: Pcn [penicillins]    Social History:   Social History     Socioeconomic History    Marital status:      Spouse name: Not on file    Number of children: Not on file    Years of education: Not on file    Highest education level: Not on file   Occupational History    Not on file   Tobacco Use    Smoking status: Current Every Day Smoker     Packs/day: 0.25     Years: 50.00     Pack years: 12.50     Types: Cigarettes    Smokeless tobacco: Never Used   Vaping Use    Vaping Use: Never used   Substance and Sexual Activity    Alcohol use: No    Drug use: No    Sexual activity: Never   Other Topics Concern    Not on file   Social History Narrative    Resides in an apartment with her son    Uses a cane for ambulation     Social Determinants of Health     Financial Resource Strain:     Difficulty of Paying Living Expenses: Not on file   Food Insecurity:     Worried About Running Out of Food in the Last Year: Not on file    Emily of Food in the Last Year: Not on file   Transportation Needs:     Lack of Transportation (Medical): Not on file    Lack of Transportation (Non-Medical):  Not on file   Physical Activity:     Days of Exercise per Week: Not on file    Minutes of Exercise per Session: Not on file   Stress:     Feeling of Stress : Not on file   Social Connections:     Frequency of Communication with Friends and Family: Not on file    Frequency of Social Gatherings with Friends and Family: Not on file    Attends [employer] Services: Not on file    Active Member of Clubs or Organizations: Not on file    Attends Club or Organization Meetings: Not on file    Marital Status: Not on file   Intimate Partner Violence:     Fear of Current or Ex-Partner: Not on file    Emotionally Abused: Not on file    Physically Abused: Not on file    Sexually Abused: Not on file   Housing Stability:     Unable to Pay for Housing in the Last Year: Not on file    Number of [place] in the Last Year: Not on file    Unstable Housing in the Last Year: Not on file      Family History:       Problem Relation Age of Onset    High Blood Pressure Mother     High Blood Pressure Father     High Blood Pressure Daughter     Cancer Son         PHYSICAL EXAM:      BP (!) 213/80   Pulse 86   Temp 97.6 °F (36.4 °C) (Temporal)   Resp 18   Wt 117 lb 4.6 oz (53.2 kg)   SpO2 98%   BMI 20.78 kg/m²  I        Heart:  RRR    Lungs:  CTA b    Abdomen:  S/NT/ND/+BS      ASSESSMENT AND PLAN:  ASA: per anesthesia  Mallampati: per anesthesia  1. Patient is a 79 y.o. female here for colonoscopy   2. Procedure options, risks and benefits reviewed with the patient. The patient expresses understanding.     David Osorio

## 2021-12-10 NOTE — PROGRESS NOTES
Gastroenterology Progress Note    Kianna Carter is a 79 y.o. female patient. Active Problems:    GI bleed  Resolved Problems:    * No resolved hospital problems. *      SUBJECTIVE:  No further bleeding. Current Facility-Administered Medications: lidocaine PF 1 % injection 5 mL, 5 mL, IntraDERmal, Once  0.9 % sodium chloride infusion, , IntraVENous, PRN  docusate sodium (COLACE) capsule 100 mg, 100 mg, Oral, Daily  melatonin tablet 3 mg, 3 mg, Oral, Nightly PRN  oxybutynin (DITROPAN) tablet 5 mg, 5 mg, Oral, BID PRN  atorvastatin (LIPITOR) tablet 10 mg, 10 mg, Oral, Daily  traZODone (DESYREL) tablet 50 mg, 50 mg, Oral, Nightly PRN  0.9 % sodium chloride infusion, , IntraVENous, Continuous  sodium chloride flush 0.9 % injection 5-40 mL, 5-40 mL, IntraVENous, 2 times per day  sodium chloride flush 0.9 % injection 5-40 mL, 5-40 mL, IntraVENous, PRN  0.9 % sodium chloride infusion, 25 mL, IntraVENous, PRN  ondansetron (ZOFRAN-ODT) disintegrating tablet 4 mg, 4 mg, Oral, Q8H PRN **OR** ondansetron (ZOFRAN) injection 4 mg, 4 mg, IntraVENous, Q6H PRN  acetaminophen (TYLENOL) tablet 650 mg, 650 mg, Oral, Q6H PRN **OR** acetaminophen (TYLENOL) suppository 650 mg, 650 mg, Rectal, Q6H PRN  pantoprazole (PROTONIX) 80 mg in sodium chloride 0.9 % 100 mL infusion, 8 mg/hr, IntraVENous, Continuous  polyethylene glycol (GoLYTELY) solution 4,000 mL, 4,000 mL, Per NG tube, Once  lisinopril (PRINIVIL;ZESTRIL) tablet 20 mg, 20 mg, Oral, Daily  hydrALAZINE (APRESOLINE) tablet 25 mg, 25 mg, Oral, 3 times per day  NIFEdipine (PROCARDIA XL) extended release tablet 30 mg, 30 mg, Oral, Daily    Physical    VITALS:  BP (!) 184/90   Pulse 75   Temp 99.2 °F (37.3 °C) (Oral)   Resp 20   Wt 117 lb 4.6 oz (53.2 kg)   SpO2 96%   BMI 20.78 kg/m²   TEMPERATURE:  Current - Temp: 99.2 °F (37.3 °C);  Max - Temp  Av.6 °F (37 °C)  Min: 98.1 °F (36.7 °C)  Max: 99.6 °F (37.6 °C)    NAD  Eyes: No icterus  RRR  Lungs CTA Bilaterally, normal effort  Abdomen soft, ND, NT, Bowel sounds normal.  Ext: no edema  Neuro: No tremor  Psych: A&Ox3    Data    Data Review:    Recent Labs     12/09/21  1115 12/09/21  1510 12/09/21  2230 12/10/21  0400 12/10/21  1136   WBC 5.6  --   --   --   --    HGB 11.1*   < > 9.3* 10.8* 11.3*   HCT 35.3*   < > 27.8* 33.3* 34.1*   MCV 81.6  --   --   --   --      --   --   --   --     < > = values in this interval not displayed. Recent Labs     12/09/21  1115      K 3.4*      CO2 24   BUN 22*   CREATININE 1.0     Recent Labs     12/09/21  1115 12/10/21  0633   AST 18 16   ALT 8* 6*   BILIDIR  --  <0.2   BILITOT 0.3 0.6   ALKPHOS 78 58     No results for input(s): LIPASE, AMYLASE in the last 72 hours. Recent Labs     12/09/21  1626   PROTIME 13.2*   INR 1.16*     No results for input(s): PTT in the last 72 hours. ASSESSMENT:  79 y. o. female with a PMH of CVA, HTN, GERD, Arthritis, diverticulosis with prior diverticular bleeds, cholecystectomy, and hysterectomy who presented on 12/9/2021 with hematochezia that started this morning with large red blood. Painless. Multiple prior diverticular bleeds and has had colonoscopies 2014, 2016, 2017 and 2019) which have been poorly prepped but showed diverticulosis.  EGD 2104 and 2016 neg.      Initial CT A/P performed in [address] [place] [address] [place] sided diverticulosis without evidence of colitis/diverticulitis and small inguinal hernia containing distal ileum.  No other acute findings.  CTA recommended and I discussed with Dr. Vic Monahan by phone and no active bleeding identified. Blood work shows a hgb of 11.1 and 10.7     Imp: Hematochezia and acute blood loss anemia. Probable diverticular bleed. Colonoscopy 12/10/21 showed moderate diverticulosis throughout the colon. No blood in the colon. Good prep this time. 3mm polyp in the sigmoid that appeared hyperplastic and was left alone. Small grade 1 internal hemorrhoids.       PLAN :  Clear diet today.   If no further bleeding then can advance diet tomorrow. If re-bleeds, would get tagged RBC scan. Thank you for allowing me to participate in the care of your patient. Please feel free to contact me with any concerns.   [address], MD

## 2021-12-10 NOTE — OP NOTE
Endoscopy Note    Patient: Amador Srivastava  : 1940  Acct#:     Procedure: Colonoscopy with intubation of the terminal ileum    Date:  12/10/2021    Surgeon:  Gabriel Spear MD, MD    Referring Physician:  Dr. Adeline Vora    Anesthesia:  TIVA    Indications: This is a 79y.o. year old female who presents today with  Hematochezia. Procedure: An informed consent was obtained from the patient after explanation of indications, benefits, possible risks and complications of the procedure. The patient was then taken to the endoscopy suite, placed in the left lateral decubitus position, and the above IV anesthesia was administered. A digital rectal examination was performed and revealed negative without mass, lesions or tenderness. The Olympus pediatric video colonoscope was placed in the patient's rectum under digital direction and advanced to the cecum. The cecum was identified by characteristic anatomy and ballottment. The prep was good. The ileocecal valve was identified and intubated. The ascending colon was examined twice to assure no sessile polyps missed. The scope was then withdrawn back through the cecum, ascending, transverse, descending and sigmoid colons. Carefull circumferential examination of the mucosa in these areas was performed. The scope was then withdrawn into the rectum and retroflexed. The scope was straightened, the colon was decompressed and the scope was withdrawn from the patient. Findings:  1. Normal Ileum  2. There was moderate diverticulosis throughout the colon. No blood in the colon. The diverticuli were flushed out but no visible vessel or active bleeding identified. 3.  Small grade 1 internal hemorrhoids. 4.  3mm sigmoid polyp left alone in setting of bleeding. Appeared hyperplastic. 5.  Small grade 1 internal hemorrhoids. The patient tolerated the procedure well and was taken to Recovery in good condition. No complications.     EBL: None  Specimens taken: none      Impression:   1. Normal Ileum  2. There was moderate diverticulosis throughout the colon. No blood in the colon. The diverticuli were flushed out but no visible vessel or active bleeding identified. 3.  Small grade 1 internal hemorrhoids. 4.  3mm sigmoid polyp left alone in setting of bleeding. Appeared hyperplastic. 5.  Small grade 1 internal hemorrhoids. Recommendations:   1.   See my progress note    Jennifer Ambrocio MD, MD   [address]  12/10/2021

## 2021-12-11 VITALS
SYSTOLIC BLOOD PRESSURE: 160 MMHG | HEART RATE: 88 BPM | OXYGEN SATURATION: 98 % | TEMPERATURE: 98.6 F | DIASTOLIC BLOOD PRESSURE: 67 MMHG | BODY MASS INDEX: 20.78 KG/M2 | WEIGHT: 117.28 LBS | RESPIRATION RATE: 18 BRPM

## 2021-12-11 LAB
ANION GAP SERPL CALCULATED.3IONS-SCNC: 11 MMOL/L (ref 3–16)
BUN BLDV-MCNC: 7 MG/DL (ref 7–20)
CALCIUM SERPL-MCNC: 8 MG/DL (ref 8.3–10.6)
CHLORIDE BLD-SCNC: 109 MMOL/L (ref 99–110)
CO2: 22 MMOL/L (ref 21–32)
CREAT SERPL-MCNC: 0.9 MG/DL (ref 0.6–1.2)
GFR AFRICAN AMERICAN: >60
GFR NON-AFRICAN AMERICAN: 60
GLUCOSE BLD-MCNC: 118 MG/DL (ref 70–99)
HCT VFR BLD CALC: 31.1 % (ref 36–48)
HCT VFR BLD CALC: 31.8 % (ref 36–48)
HEMOGLOBIN: 10.3 G/DL (ref 12–16)
HEMOGLOBIN: 10.4 G/DL (ref 12–16)
MAGNESIUM: 1.3 MG/DL (ref 1.8–2.4)
POTASSIUM SERPL-SCNC: 3.5 MMOL/L (ref 3.5–5.1)
SODIUM BLD-SCNC: 142 MMOL/L (ref 136–145)

## 2021-12-11 PROCEDURE — 83735 ASSAY OF MAGNESIUM: CPT

## 2021-12-11 PROCEDURE — C9113 INJ PANTOPRAZOLE SODIUM, VIA: HCPCS | Performed by: FAMILY MEDICINE

## 2021-12-11 PROCEDURE — 6360000002 HC RX W HCPCS: Performed by: FAMILY MEDICINE

## 2021-12-11 PROCEDURE — 6370000000 HC RX 637 (ALT 250 FOR IP): Performed by: FAMILY MEDICINE

## 2021-12-11 PROCEDURE — 6370000000 HC RX 637 (ALT 250 FOR IP): Performed by: HOSPITALIST

## 2021-12-11 PROCEDURE — 36415 COLL VENOUS BLD VENIPUNCTURE: CPT

## 2021-12-11 PROCEDURE — 85014 HEMATOCRIT: CPT

## 2021-12-11 PROCEDURE — 2580000003 HC RX 258: Performed by: FAMILY MEDICINE

## 2021-12-11 PROCEDURE — 85018 HEMOGLOBIN: CPT

## 2021-12-11 PROCEDURE — 2580000003 HC RX 258: Performed by: HOSPITALIST

## 2021-12-11 PROCEDURE — 80048 BASIC METABOLIC PNL TOTAL CA: CPT

## 2021-12-11 PROCEDURE — 94760 N-INVAS EAR/PLS OXIMETRY 1: CPT

## 2021-12-11 RX ORDER — LANOLIN ALCOHOL/MO/W.PET/CERES
400 CREAM (GRAM) TOPICAL
Status: DISCONTINUED | OUTPATIENT
Start: 2021-12-11 | End: 2021-12-11 | Stop reason: HOSPADM

## 2021-12-11 RX ORDER — LANOLIN ALCOHOL/MO/W.PET/CERES
325 CREAM (GRAM) TOPICAL
Qty: 90 TABLET | Refills: 3 | Status: SHIPPED | OUTPATIENT
Start: 2021-12-11

## 2021-12-11 RX ORDER — MAGNESIUM SULFATE IN WATER 40 MG/ML
4000 INJECTION, SOLUTION INTRAVENOUS ONCE
Status: COMPLETED | OUTPATIENT
Start: 2021-12-11 | End: 2021-12-11

## 2021-12-11 RX ORDER — LANOLIN ALCOHOL/MO/W.PET/CERES
400 CREAM (GRAM) TOPICAL
Qty: 30 TABLET | Refills: 0 | Status: SHIPPED | OUTPATIENT
Start: 2021-12-11

## 2021-12-11 RX ADMIN — Medication 10 ML: at 09:21

## 2021-12-11 RX ADMIN — DOCUSATE SODIUM 100 MG: 100 CAPSULE ORAL at 09:22

## 2021-12-11 RX ADMIN — LISINOPRIL 20 MG: 20 TABLET ORAL at 09:22

## 2021-12-11 RX ADMIN — Medication 400 MG: at 15:21

## 2021-12-11 RX ADMIN — NIFEDIPINE 30 MG: 30 TABLET, FILM COATED, EXTENDED RELEASE ORAL at 09:21

## 2021-12-11 RX ADMIN — HYDRALAZINE HYDROCHLORIDE 25 MG: 25 TABLET, FILM COATED ORAL at 06:07

## 2021-12-11 RX ADMIN — OXYCODONE HYDROCHLORIDE 10 MG: 10 TABLET ORAL at 11:01

## 2021-12-11 RX ADMIN — HYDRALAZINE HYDROCHLORIDE 25 MG: 25 TABLET, FILM COATED ORAL at 15:22

## 2021-12-11 RX ADMIN — MAGNESIUM SULFATE HEPTAHYDRATE 4000 MG: 40 INJECTION, SOLUTION INTRAVENOUS at 10:58

## 2021-12-11 RX ADMIN — SODIUM CHLORIDE, PRESERVATIVE FREE 10 ML: 5 INJECTION INTRAVENOUS at 09:25

## 2021-12-11 RX ADMIN — PANTOPRAZOLE SODIUM 40 MG: 40 INJECTION, POWDER, FOR SOLUTION INTRAVENOUS at 09:21

## 2021-12-11 RX ADMIN — OXYCODONE 5 MG: 5 TABLET ORAL at 17:10

## 2021-12-11 RX ADMIN — SODIUM CHLORIDE: 9 INJECTION, SOLUTION INTRAVENOUS at 01:35

## 2021-12-11 RX ADMIN — ATORVASTATIN CALCIUM 10 MG: 10 TABLET, FILM COATED ORAL at 09:22

## 2021-12-11 ASSESSMENT — PAIN SCALES - GENERAL
PAINLEVEL_OUTOF10: 10
PAINLEVEL_OUTOF10: 6

## 2021-12-11 NOTE — PLAN OF CARE
Problem: Falls - Risk of:  Goal: Will remain free from falls  Description: Will remain free from falls  Outcome: Ongoing  Note: Patient's bed is in a low position, call light in reach, and bed alarms on. Will continue to monitor     Problem: Safety:  Goal: Free from accidental physical injury  Description: Free from accidental physical injury  Outcome: Ongoing  Note: Patient's bed is in a low position, call light in reach, and bed alarms on.  Will continue to monitor

## 2021-12-11 NOTE — PROGRESS NOTES
Gastroenterology Progress Note    Meredith Onofre is a 79 y.o. female patient. Active Problems:    GI bleed  Resolved Problems:    * No resolved hospital problems. *      SUBJECTIVE:  Denies any hematochezia, abdominal pain, nausea, vomiting, diarrhea.     Current Facility-Administered Medications: oxyCODONE (ROXICODONE) immediate release tablet 5 mg, 5 mg, Oral, Q4H PRN **OR** oxyCODONE HCl (OXY-IR) immediate release tablet 10 mg, 10 mg, Oral, Q4H PRN  pantoprazole (PROTONIX) injection 40 mg, 40 mg, IntraVENous, Daily **AND** sodium chloride (PF) 0.9 % injection 10 mL, 10 mL, IntraVENous, Daily  potassium chloride 20 mEq/50 mL IVPB (Central Line), 20 mEq, IntraVENous, PRN  lidocaine PF 1 % injection 5 mL, 5 mL, IntraDERmal, Once  0.9 % sodium chloride infusion, , IntraVENous, PRN  docusate sodium (COLACE) capsule 100 mg, 100 mg, Oral, Daily  melatonin tablet 3 mg, 3 mg, Oral, Nightly PRN  oxybutynin (DITROPAN) tablet 5 mg, 5 mg, Oral, BID PRN  atorvastatin (LIPITOR) tablet 10 mg, 10 mg, Oral, Daily  traZODone (DESYREL) tablet 50 mg, 50 mg, Oral, Nightly PRN  0.9 % sodium chloride infusion, , IntraVENous, Continuous  sodium chloride flush 0.9 % injection 5-40 mL, 5-40 mL, IntraVENous, 2 times per day  sodium chloride flush 0.9 % injection 5-40 mL, 5-40 mL, IntraVENous, PRN  0.9 % sodium chloride infusion, 25 mL, IntraVENous, PRN  ondansetron (ZOFRAN-ODT) disintegrating tablet 4 mg, 4 mg, Oral, Q8H PRN **OR** ondansetron (ZOFRAN) injection 4 mg, 4 mg, IntraVENous, Q6H PRN  acetaminophen (TYLENOL) tablet 650 mg, 650 mg, Oral, Q6H PRN **OR** acetaminophen (TYLENOL) suppository 650 mg, 650 mg, Rectal, Q6H PRN  polyethylene glycol (GoLYTELY) solution 4,000 mL, 4,000 mL, Per NG tube, Once  lisinopril (PRINIVIL;ZESTRIL) tablet 20 mg, 20 mg, Oral, Daily  hydrALAZINE (APRESOLINE) tablet 25 mg, 25 mg, Oral, 3 times per day  NIFEdipine (PROCARDIA XL) extended release tablet 30 mg, 30 mg, Oral, Daily    Physical    VITALS: BP (!) 152/77   Pulse 80   Temp 98.3 °F (36.8 °C) (Oral)   Resp 18   Wt 117 lb 4.6 oz (53.2 kg)   SpO2 98%   BMI 20.78 kg/m²   TEMPERATURE:  Current - Temp: 98.3 °F (36.8 °C); Max - Temp  Av.4 °F (36.9 °C)  Min: 97.5 °F (36.4 °C)  Max: 99.4 °F (37.4 °C)    NAD  Eyes: No icterus  RRR  Lungs CTA Bilaterally, normal effort  Abdomen soft, ND, NT, Bowel sounds normal.  Ext: no edema  Neuro: No tremor  Psych: A&Ox3    Data    Data Review:    Recent Labs     21  1115 21  1510 12/10/21  1745 21  0000 21  0455   WBC 5.6  --   --   --   --    HGB 11.1*   < > 11.0* 10.4* 10.3*   HCT 35.3*   < > 32.8* 31.8* 31.1*   MCV 81.6  --   --   --   --      --   --   --   --     < > = values in this interval not displayed. Recent Labs     21  1115 12/10/21  1745 21  0455    142 142   K 3.4* 2.7* 3.5    107 109   CO2 24 22 22   BUN 22* 10 7   CREATININE 1.0 0.8 0.9     Recent Labs     21  1115 12/10/21  0633   AST 18 16   ALT 8* 6*   BILIDIR  --  <0.2   BILITOT 0.3 0.6   ALKPHOS 78 58     No results for input(s): LIPASE, AMYLASE in the last 72 hours. Recent Labs     21  1626   PROTIME 13.2*   INR 1.16*     No results for input(s): PTT in the last 72 hours. ASSESSMENT:  79 y. o. female with a PMH of CVA, HTN, GERD, Arthritis, diverticulosis with prior diverticular bleeds, cholecystectomy, and hysterectomy who presented on 2021 with hematochezia that started this morning with large red blood. Painless. Multiple prior diverticular bleeds and has had colonoscopies , , 2017 and ) which have been poorly prepped but showed diverticulosis.  EGD  and  neg.      Initial CT A/P performed in [address] [facility] sided diverticulosis without evidence of colitis/diverticulitis and small inguinal hernia containing distal ileum.  No other acute findings.  CTA recommended and I discussed with Dr. Delisa Bowles by phone and no active bleeding identified. Blood work shows a hgb of 11.1 and 10.7     Imp: Hematochezia and acute blood loss anemia. Probable diverticular bleed. Colonoscopy 12/10/21 showed moderate diverticulosis throughout the colon. No blood in the colon. Good prep this time. 3mm polyp in the sigmoid that appeared hyperplastic and was left alone. Small grade 1 internal hemorrhoids.       PLAN :  Low fat diet  If re-bleeds would get stat tagged RBC scan. She has had no recurrent bleeding and hemoglobin stable. OK for discharge from a GI standpoint. Will sign off. Please call with questions. Thank you for allowing me to participate in the care of your patient. Please feel free to contact me with any concerns.   [address], [place]

## 2021-12-11 NOTE — DISCHARGE INSTR - COC
Hyperlipidemia E78.5    Fall against object W18.00XA    Hypokalemia E87.6    Severe protein-calorie malnutrition Siegel Blood: less than 60% of standard weight) ([facility] [address] E43    Hypertensive urgency I16.0    Lower GI bleed K92.2    Sepsis ([facility] [address] A41.9    GI bleed K92.2       Isolation/Infection:   Isolation            No Isolation          Patient Infection Status       Infection Onset Added Last Indicated Last Indicated By Review Planned Expiration Resolved Resolved By    None active    Resolved    COVID-19 (Rule Out) 01/25/21 01/25/21 01/25/21 COVID-19 (Ordered)   01/25/21 Rule-Out Test Resulted    COVID-19 (Rule Out) 01/19/21 01/19/21 01/19/21 COVID-19 (Ordered)   01/19/21 Rule-Out Test Resulted            Nurse Assessment:  Last Vital Signs: BP (!) 160/67   Pulse 88   Temp 98.6 °F (37 °C) (Oral)   Resp 18   Wt 117 lb 4.6 oz (53.2 kg)   SpO2 98%   BMI 20.78 kg/m²     Last documented pain score (0-10 scale): Pain Level: 10  Last Weight:   Wt Readings from Last 1 Encounters:   12/11/21 117 lb 4.6 oz (53.2 kg)     Mental Status:  {IP PT MENTAL STATUS:20030}    IV Access:  { KAREN IV ACCESS:116751659}    Nursing Mobility/ADLs:  Walking   {CHP DME ODLP:369334948}  Transfer  {CHP DME ZXZJ:875433010}  Bathing  {CHP DME OCLE:816861992}  Dressing  {CHP DME UPSF:728090874}  Toileting  {CHP DME WOTE:512660756}  Feeding  {CHP DME OZCN:257260595}  Med Admin  {CHP DME ZVVK:939749878}  Med Delivery   { KAREN MED Delivery:459926794}    Wound Care Documentation and Therapy:        Elimination:  Continence:    Bowel: {YES / DU:81872}  Bladder: {YES / JENNIE:85664}  Urinary Catheter: {Urinary Catheter:497103982}   Colostomy/Ileostomy/Ileal Conduit: {YES / DU:64200}  [REMOVED] Rectal Tube With balloon-Stool Appearance: Loose, Bloody  [REMOVED] Rectal Tube With balloon-Stool Color: Red  [REMOVED] Rectal Tube With balloon-Stool Amount: Medium    Date of Last BM: ***    Intake/Output Summary (Last 24 hours) at 12/11/2021 1441  Last data filed at 2021 0518  Gross per 24 hour   Intake 2208.41 ml   Output 1400 ml   Net 808.41 ml     I/O last 3 completed shifts: In: 4355.5 [I.V.:4222.9; IV Piggyback:132.6]  Out: 3600 [Urine:2900; Stool:700]    Safety Concerns:     { KAREN Safety Concerns:635516332}    Impairments/Disabilities:      [address] [facility] Impairments/Disabilities:741628761}    Nutrition Therapy:  Current Nutrition Therapy:   [address] [facility] Diet List:216033643}    Routes of Feeding: {CHP DME Other Feedings:397173345}  Liquids: {Slp liquid thickness:21499}  Daily Fluid Restriction: {CHP DME Yes amt example:737615799}  Last Modified Barium Swallow with Video (Video Swallowing Test): {Done Not Done WVHO:608013589}    Treatments at the Time of Hospital Discharge:   Respiratory Treatments: ***  Oxygen Therapy:  {Therapy; copd oxygen:83675}  Ventilator:    { CC Vent JUCB:331547488}    Rehab Therapies: {THERAPEUTIC INTERVENTION:9870357363}  Weight Bearing Status/Restrictions: [facility] Weight Bearin}  Other Medical Equipment (for information only, NOT a DME order):  {EQUIPMENT:090864974}  Other Treatments: ***    Patient's personal belongings (please select all that are sent with patient):  {[facility] DME Belongings:858273904}    RN SIGNATURE:  {Esignature:850319814}    CASE MANAGEMENT/SOCIAL WORK SECTION    Inpatient Status Date: ***    Readmission Risk Assessment Score:  Readmission Risk              Risk of Unplanned Readmission:  18           Discharging to Facility/ Agency   Name:   Address:  Phone:  Fax:    Dialysis Facility (if applicable)   Name:  Address:  Dialysis Schedule:  Phone:  Fax:    / signature: {Esignature:785661032}    PHYSICIAN SECTION    Prognosis: Fair    Condition at Discharge: Stable    Rehab Potential (if transferring to Rehab):  Fair    Recommended Labs or Other Treatments After Discharge: pt, ot, nurse    Physician Certification: I certify the above information and transfer of [facility]  is necessary for the continuing treatment of the diagnosis listed and that she requires Home Care for greater 30 days.      Update Admission H&P: No change in H&P    PHYSICIAN SIGNATURE:  Electronically signed by Shara Cormier DO on 12/11/21 at 2:42 PM EST

## 2022-04-15 ENCOUNTER — HOSPITAL ENCOUNTER (EMERGENCY)
Age: 82
Discharge: HOME | End: 2022-04-16
Payer: MEDICARE

## 2022-04-15 VITALS
BODY MASS INDEX: 21.51 KG/M2 | RESPIRATION RATE: 20 BRPM | TEMPERATURE: 97.9 F | WEIGHT: 121.4 LBS | OXYGEN SATURATION: 96 % | HEART RATE: 70 BPM | SYSTOLIC BLOOD PRESSURE: 125 MMHG | HEIGHT: 63 IN | DIASTOLIC BLOOD PRESSURE: 103 MMHG

## 2022-04-15 DIAGNOSIS — N39.0 ACUTE UTI: Primary | ICD-10-CM

## 2022-04-15 LAB
ANION GAP SERPL CALCULATED.3IONS-SCNC: 14 MMOL/L (ref 3–16)
BACTERIA: ABNORMAL /HPF
BASOPHILS ABSOLUTE: 0.1 K/UL (ref 0–0.2)
BASOPHILS RELATIVE PERCENT: 1.1 %
BILIRUBIN URINE: NEGATIVE
BLOOD, URINE: ABNORMAL
BUN BLDV-MCNC: 32 MG/DL (ref 7–20)
CALCIUM SERPL-MCNC: 9.6 MG/DL (ref 8.3–10.6)
CHLORIDE BLD-SCNC: 97 MMOL/L (ref 99–110)
CLARITY: ABNORMAL
CO2: 24 MMOL/L (ref 21–32)
COLOR: YELLOW
COMMENT UA: ABNORMAL
CREAT SERPL-MCNC: 1.2 MG/DL (ref 0.6–1.2)
EOSINOPHILS ABSOLUTE: 0.2 K/UL (ref 0–0.6)
EOSINOPHILS RELATIVE PERCENT: 3.4 %
EPITHELIAL CELLS, UA: 2 /HPF (ref 0–5)
GFR AFRICAN AMERICAN: 52
GFR NON-AFRICAN AMERICAN: 43
GLUCOSE BLD-MCNC: 93 MG/DL (ref 70–99)
GLUCOSE URINE: NEGATIVE MG/DL
HCT VFR BLD CALC: 35.6 % (ref 36–48)
HEMOGLOBIN: 11.4 G/DL (ref 12–16)
HYALINE CASTS: 2 /LPF (ref 0–8)
KETONES, URINE: NEGATIVE MG/DL
LACTIC ACID, SEPSIS: 1.4 MMOL/L (ref 0.4–1.9)
LEUKOCYTE ESTERASE, URINE: ABNORMAL
LYMPHOCYTES ABSOLUTE: 1.3 K/UL (ref 1–5.1)
LYMPHOCYTES RELATIVE PERCENT: 24.4 %
MCH RBC QN AUTO: 23.8 PG (ref 26–34)
MCHC RBC AUTO-ENTMCNC: 31.9 G/DL (ref 31–36)
MCV RBC AUTO: 74.7 FL (ref 80–100)
MICROSCOPIC EXAMINATION: YES
MONOCYTES ABSOLUTE: 0.7 K/UL (ref 0–1.3)
MONOCYTES RELATIVE PERCENT: 12.5 %
NEUTROPHILS ABSOLUTE: 3 K/UL (ref 1.7–7.7)
NEUTROPHILS RELATIVE PERCENT: 58.6 %
NITRITE, URINE: POSITIVE
PDW BLD-RTO: 19.3 % (ref 12.4–15.4)
PH UA: 6 (ref 5–8)
PLATELET # BLD: 404 K/UL (ref 135–450)
PMV BLD AUTO: 6.3 FL (ref 5–10.5)
POTASSIUM REFLEX MAGNESIUM: 5.2 MMOL/L (ref 3.5–5.1)
PROTEIN UA: ABNORMAL MG/DL
RBC # BLD: 4.77 M/UL (ref 4–5.2)
SODIUM BLD-SCNC: 135 MMOL/L (ref 136–145)
SPECIFIC GRAVITY UA: 1.01 (ref 1–1.03)
URINE REFLEX TO CULTURE: YES
URINE TYPE: ABNORMAL
UROBILINOGEN, URINE: 1 E.U./DL
WBC # BLD: 5.2 K/UL (ref 4–11)
WBC UA: 116 /HPF (ref 0–5)

## 2022-04-15 PROCEDURE — 87186 SC STD MICRODIL/AGAR DIL: CPT

## 2022-04-15 PROCEDURE — 6360000002 HC RX W HCPCS: Performed by: PHYSICIAN ASSISTANT

## 2022-04-15 PROCEDURE — 96365 THER/PROPH/DIAG IV INF INIT: CPT

## 2022-04-15 PROCEDURE — 96366 THER/PROPH/DIAG IV INF ADDON: CPT

## 2022-04-15 PROCEDURE — 99284 EMERGENCY DEPT VISIT MOD MDM: CPT

## 2022-04-15 PROCEDURE — 36415 COLL VENOUS BLD VENIPUNCTURE: CPT

## 2022-04-15 PROCEDURE — 87088 URINE BACTERIA CULTURE: CPT

## 2022-04-15 PROCEDURE — 81001 URINALYSIS AUTO W/SCOPE: CPT

## 2022-04-15 PROCEDURE — 87086 URINE CULTURE/COLONY COUNT: CPT

## 2022-04-15 PROCEDURE — 2580000003 HC RX 258: Performed by: PHYSICIAN ASSISTANT

## 2022-04-15 PROCEDURE — 85025 COMPLETE CBC W/AUTO DIFF WBC: CPT

## 2022-04-15 PROCEDURE — 80048 BASIC METABOLIC PNL TOTAL CA: CPT

## 2022-04-15 PROCEDURE — 83605 ASSAY OF LACTIC ACID: CPT

## 2022-04-15 PROCEDURE — 6370000000 HC RX 637 (ALT 250 FOR IP): Performed by: PHYSICIAN ASSISTANT

## 2022-04-15 RX ORDER — 0.9 % SODIUM CHLORIDE 0.9 %
500 INTRAVENOUS SOLUTION INTRAVENOUS ONCE
Status: COMPLETED | OUTPATIENT
Start: 2022-04-15 | End: 2022-04-15

## 2022-04-15 RX ORDER — CEFUROXIME AXETIL 250 MG/1
250 TABLET ORAL 2 TIMES DAILY
Qty: 14 TABLET | Refills: 0 | Status: SHIPPED | OUTPATIENT
Start: 2022-04-15 | End: 2022-04-22

## 2022-04-15 RX ORDER — PHENAZOPYRIDINE HYDROCHLORIDE 100 MG/1
200 TABLET, FILM COATED ORAL ONCE
Status: COMPLETED | OUTPATIENT
Start: 2022-04-15 | End: 2022-04-15

## 2022-04-15 RX ADMIN — PHENAZOPYRIDINE HYDROCHLORIDE 200 MG: 100 TABLET ORAL at 19:14

## 2022-04-15 RX ADMIN — CEFTRIAXONE 1000 MG: 1 INJECTION, POWDER, FOR SOLUTION INTRAMUSCULAR; INTRAVENOUS at 21:30

## 2022-04-15 RX ADMIN — SODIUM CHLORIDE 500 ML: 9 INJECTION, SOLUTION INTRAVENOUS at 19:51

## 2022-04-15 NOTE — ED NOTES
Report given to Oncoming ED RN. Blood work done. Awaiting medications and Urinalysis to be done. Oncoming RN Aware.       Cristin Mei RN  04/15/22 1922

## 2022-04-16 NOTE — ED PROVIDER NOTES
**ADVANCED PRACTICE PROVIDER, I HAVE EVALUATED THIS PATIENT**        [address]      Pt Name: Tiffani Nickerson  URN:[id number]  [place] 1940  Date of evaluation: 4/15/2022  Provider: Yonas Ceballos PA-C      Chief Complaint:    Chief Complaint   Patient presents with    Hematuria     States she believes she has a bladder infection, having small amounts of blood in urine. Started today. Frequent urination recently. Nursing Notes, Past Medical Hx, Past Surgical Hx, Social Hx, Allergies, and Family Hx were all reviewed and agreed with or any disagreements were addressed in the HPI.    HPI: (Location, Duration, Timing, Severity, Quality, Assoc Sx, Context, Modifying factors)    Chief Complaint of a small amount of blood in the urine as well as increased urine frequency    This is a  79 y.o. female who presents stating that she has been urinating a little more frequently over the last couple of days or so. Then today she noticed a small amount of blood in the urine 2 times. Because of this she decided to come to the ER. Ambulance brought her in. No fevers or chills or abdominal pain. No shortness of breath or difficulty breathing or any chest pain at this time. No other acute complaint.     PastMedical/Surgical History:      Diagnosis Date    CARROLL positive     Arthritis     Bleeding stomach ulcer     Diverticulitis     GERD (gastroesophageal reflux disease)     Hypertension     Stroke ([facility] [address] 2002    left weakness     Tobacco abuse          Procedure Laterality Date    CHOLECYSTECTOMY  2014    COLONOSCOPY  2011    COLONOSCOPY N/A 12/31/2019    COLONOSCOPY DIAGNOSTIC performed by Angle Savage.DO at [facility]  COLONOSCOPY N/A 12/10/2021    COLONOSCOPY DIAGNOSTIC performed by Maldonado Amin MD at [address], TOTAL ABDOMINAL  1970's    JACQUELYN/BSO for 'tumor' Medications:  Previous Medications    CALCIUM-VITAMIN D (OSCAL-500) 500-200 MG-UNIT PER TABLET    Take 1 tablet by mouth 2 times daily    DOCUSATE SODIUM (COLACE) 100 MG CAPSULE    Take 100 mg by mouth 2 times daily as needed for Constipation     FERROUS SULFATE (FE TABS) 325 (65 FE) MG EC TABLET    Take 1 tablet by mouth daily (with breakfast)    LISINOPRIL (PRINIVIL;ZESTRIL) 20 MG TABLET    Take 20 mg by mouth daily    MAGNESIUM OXIDE (MAG-OX) 400 (240 MG) MG TABLET    Take 1 tablet by mouth Daily with lunch    MELATONIN 3 MG TABS TABLET    Take 1 tablet by mouth nightly as needed (Sleep)    NIFEDIPINE (ADALAT CC) 30 MG EXTENDED RELEASE TABLET    Take 1 tablet by mouth daily    OMEPRAZOLE (PRILOSEC) 40 MG DELAYED RELEASE CAPSULE    Take 40 mg by mouth daily    OXYBUTYNIN (DITROPAN) 5 MG TABLET    Take 5 mg by mouth 2 times daily     SIMVASTATIN (ZOCOR) 40 MG TABLET    Take 40 mg by mouth nightly    TRAMADOL (ULTRAM) 50 MG TABLET    Take 50 mg by mouth 3 times daily as needed for Pain. TRAZODONE (DESYREL) 50 MG TABLET    Take 50 mg by mouth nightly as needed for Sleep         Review of Systems:  (2-9 systems needed)  Review of Systems  Positive history as above no fevers or chills cough or congestion headache vision change neck pain or stiffness shortness of breath or chest pain or palpitation. No abdominal pain or difficulty eating or drinking. No acute stool changes. Positive for the urine changes as above. No extremity acute weakness or rash. \"Positives and Pertinent negatives as per HPI\"    Physical Exam:  Physical Exam  Vitals and nursing note reviewed. Constitutional:       Appearance: Normal appearance. She is not diaphoretic. HENT:      Head: Normocephalic and atraumatic. Right Ear: External ear normal.      Left Ear: External ear normal.      Nose: Nose normal.      Mouth/Throat:      Mouth: Mucous membranes are moist.      Pharynx: No posterior oropharyngeal erythema.    Eyes: General:         Right eye: No discharge. Left eye: No discharge. Conjunctiva/sclera: Conjunctivae normal.   Cardiovascular:      Rate and Rhythm: Normal rate and regular rhythm. Pulses: Normal pulses. Heart sounds: Normal heart sounds. No murmur heard. No gallop. Pulmonary:      Effort: Pulmonary effort is normal. No respiratory distress. Breath sounds: Normal breath sounds. No wheezing, rhonchi or rales. Abdominal:      General: Bowel sounds are normal. There is no distension. Palpations: Abdomen is soft. Tenderness: There is no abdominal tenderness. There is no guarding or rebound. Musculoskeletal:         General: No swelling, tenderness, deformity or signs of injury. Normal range of motion. Cervical back: Normal range of motion and neck supple. No rigidity. Lymphadenopathy:      Cervical: No cervical adenopathy. Skin:     General: Skin is warm and dry. Capillary Refill: Capillary refill takes less than 2 seconds. Findings: No rash. Neurological:      Mental Status: She is alert and oriented to person, place, and time. Mental status is at baseline.    Psychiatric:         Mood and Affect: Mood normal.         Behavior: Behavior normal.         MEDICAL DECISION MAKING    Vitals:    Vitals:    04/15/22 1742   BP: (!) 125/103   Pulse: 70   Resp: 20   Temp: 97.9 °F (36.6 °C)   TempSrc: Oral   SpO2: 96%   Weight: 121 lb 6.4 oz (55.1 kg)   Height: 5' 3\" (1.6 m)       LABS:  Labs Reviewed   CBC WITH AUTO DIFFERENTIAL - Abnormal; Notable for the following components:       Result Value    Hemoglobin 11.4 (*)     Hematocrit 35.6 (*)     MCV 74.7 (*)     MCH 23.8 (*)     RDW 19.3 (*)     All other components within normal limits   BASIC METABOLIC PANEL W/ REFLEX TO MG FOR LOW K - Abnormal; Notable for the following components:    Sodium 135 (*)     Potassium reflex Magnesium 5.2 (*)     Chloride 97 (*)     BUN 32 (*)     GFR Non- 43 (*) GFR  52 (*)     All other components within normal limits   URINALYSIS WITH REFLEX TO CULTURE - Abnormal; Notable for the following components:    Clarity, UA TURBID (*)     Blood, Urine LARGE (*)     Protein, UA TRACE (*)     Nitrite, Urine POSITIVE (*)     Leukocyte Esterase, Urine LARGE (*)     All other components within normal limits    Narrative:     no initials no date no time   MICROSCOPIC URINALYSIS - Abnormal; Notable for the following components:    Bacteria, UA 4+ (*)     WBC,  (*)     All other components within normal limits    Narrative:     no initials no date no time   CULTURE, URINE   LACTATE, SEPSIS   LACTATE, SEPSIS        Remainder of labs reviewed and were negative at this time or not returned at the time of this note. RADIOLOGY:   Non-plain film images such as CT, Ultrasound and MRI are read by the radiologist. Krystina Pollock PA-C have directly visualized the radiologic plain film image(s) with the below findings:      Interpretation per the Radiologist below, if available at the time of this note:    No orders to display        No results found. MEDICAL DECISION MAKING / ED COURSE:      PROCEDURES:   Procedures    None    Patient was given:  Medications   0.9 % sodium chloride bolus (500 mLs IntraVENous New Bag 4/15/22 1951)   cefTRIAXone (ROCEPHIN) 1000 mg IVPB in 50 mL D5W minibag (has no administration in time range)   phenazopyridine (PYRIDIUM) tablet 200 mg (200 mg Oral Given 4/15/22 1914)     This patient as above and evaluation and treatment is begun here. Vital signs stable and patient without any pain complaint at this time. Patient does eventually write his some urine and indeed there is positive leukocyte Estrace as well as positive nitrates present, white cells bacteria and large blood. CBC without leukocytosis or thrombocytopenia among other potential acute changes. BMP reasonably good for the patient. No elevated lactic acid.   No abdominal pain.  Patient signs and symptoms are most consistent with acute UTI and some IV antibiotic is begun here as well as some IV fluids. Patient to be discharged home for further conservative outpatient care including antibiotics and close outpatient follow-up with family doctor. She agrees to this plan. Home in good condition to drink plenty of water, use medication as prescribed, and monitor for gradual improvement. Follow-up outpatient with your family doctor early next week for recheck and further care. Return to the emergency department for any emergency worsening or concern. The patient tolerated their visit well. I evaluated the patient. The physician was available for consultation as needed. The patient and / or the family were informed of the results of any tests, a time was given to answer questions, a plan was proposed and they agreed with plan. CLINICAL IMPRESSION:  1.  Acute UTI        DISPOSITION Discharge - Pending Orders Complete 04/15/2022 09:16:38 PM      PATIENT REFERRED TO:  Zenaida Clay    Schedule an appointment as soon as possible for a visit   early next week for recheck and further care      DISCHARGE MEDICATIONS:  New Prescriptions    CEFUROXIME (CEFTIN) 250 MG TABLET    Take 1 tablet by mouth 2 times daily for 7 days       DISCONTINUED MEDICATIONS:  Discontinued Medications    No medications on file              (Please note the MDM and HPI sections of this note were completed with a voice recognition program.  Efforts were made to edit the dictations but occasionally words are mis-transcribed.)    Electronically signed, Claudette Mendez PA-C,           Claudette Mendez PA-C  04/16/22 0989

## 2022-04-18 LAB
ORGANISM: ABNORMAL
URINE CULTURE, ROUTINE: ABNORMAL

## 2022-06-04 ENCOUNTER — APPOINTMENT (OUTPATIENT)
Dept: GENERAL RADIOLOGY | Age: 82
End: 2022-06-04
Payer: MEDICARE

## 2022-06-04 ENCOUNTER — HOSPITAL ENCOUNTER (EMERGENCY)
Age: 82
Discharge: HOME | End: 2022-06-04
Attending: EMERGENCY MEDICINE
Payer: MEDICARE

## 2022-06-04 VITALS
HEART RATE: 78 BPM | TEMPERATURE: 97.4 F | SYSTOLIC BLOOD PRESSURE: 165 MMHG | HEIGHT: 63 IN | OXYGEN SATURATION: 97 % | WEIGHT: 117.6 LBS | RESPIRATION RATE: 15 BRPM | BODY MASS INDEX: 20.84 KG/M2 | DIASTOLIC BLOOD PRESSURE: 66 MMHG

## 2022-06-04 DIAGNOSIS — Z76.0 ENCOUNTER FOR MEDICATION REFILL: ICD-10-CM

## 2022-06-04 DIAGNOSIS — M25.562 CHRONIC PAIN OF BOTH KNEES: Primary | ICD-10-CM

## 2022-06-04 DIAGNOSIS — M17.0 OSTEOARTHRITIS OF BOTH KNEES, UNSPECIFIED OSTEOARTHRITIS TYPE: ICD-10-CM

## 2022-06-04 DIAGNOSIS — M25.561 CHRONIC PAIN OF BOTH KNEES: Primary | ICD-10-CM

## 2022-06-04 DIAGNOSIS — G89.29 CHRONIC PAIN OF BOTH KNEES: Primary | ICD-10-CM

## 2022-06-04 PROCEDURE — 73560 X-RAY EXAM OF KNEE 1 OR 2: CPT

## 2022-06-04 PROCEDURE — 73562 X-RAY EXAM OF KNEE 3: CPT

## 2022-06-04 PROCEDURE — 99283 EMERGENCY DEPT VISIT LOW MDM: CPT

## 2022-06-04 PROCEDURE — 6370000000 HC RX 637 (ALT 250 FOR IP): Performed by: EMERGENCY MEDICINE

## 2022-06-04 RX ORDER — TRAMADOL HYDROCHLORIDE 50 MG/1
50 TABLET ORAL ONCE
Status: COMPLETED | OUTPATIENT
Start: 2022-06-04 | End: 2022-06-04

## 2022-06-04 RX ORDER — TRAMADOL HYDROCHLORIDE 50 MG/1
50 TABLET ORAL EVERY 8 HOURS PRN
Qty: 10 TABLET | Refills: 0 | Status: SHIPPED | OUTPATIENT
Start: 2022-06-04 | End: 2022-06-07

## 2022-06-04 RX ADMIN — TRAMADOL HYDROCHLORIDE 50 MG: 50 TABLET ORAL at 15:18

## 2022-06-04 ASSESSMENT — PAIN DESCRIPTION - DESCRIPTORS
DESCRIPTORS: ACHING

## 2022-06-04 ASSESSMENT — PAIN DESCRIPTION - ORIENTATION
ORIENTATION: RIGHT;LEFT
ORIENTATION: LEFT;RIGHT
ORIENTATION: LEFT;RIGHT

## 2022-06-04 ASSESSMENT — PAIN DESCRIPTION - FREQUENCY
FREQUENCY: CONTINUOUS
FREQUENCY: CONTINUOUS

## 2022-06-04 ASSESSMENT — PAIN SCALES - GENERAL
PAINLEVEL_OUTOF10: 7
PAINLEVEL_OUTOF10: 5
PAINLEVEL_OUTOF10: 7

## 2022-06-04 ASSESSMENT — PAIN DESCRIPTION - PAIN TYPE
TYPE: CHRONIC PAIN

## 2022-06-04 ASSESSMENT — PAIN DESCRIPTION - LOCATION
LOCATION: LEG

## 2022-06-04 NOTE — ED NOTES
Discharge education complete. Patient educated on new medications, follow up care and reasons to seek medical attention. Patient denies questions or concerns, no sxs of distress noted , patient provided wheel chair at time of discharge.         Titus Mcdonald RN  06/04/22 1771

## 2022-06-04 NOTE — ED NOTES
Patient's son contacted and notified of discharge, will be to ED to transport patient home shortly.       Caitlin Castellanos RN  06/04/22 3282

## 2022-06-04 NOTE — ED PROVIDER NOTES
[facility]      Pt Name: Heather Tate  MRN: [id number]  [place] 1940  Date of evaluation: 6/4/2022  Provider: Demetrio Loaiza, [address] [place] [address]       Chief Complaint   Patient presents with    Leg Pain     Patient reports BLE pain which is chronic,rates pain at 7, reports taking Tramadol for pain but took last dose lastnight and states her MD retired. HISTORY OF PRESENT ILLNESS   (Location/Symptom, Timing/Onset, Context/Setting, Quality, Duration, Modifying Factors, Severity)  Note limiting factors. Heather Tate is a 79 y.o. female who presents to the emergency department with complaint of bilateral knee pain. She states that she has chronic pain in the joints of her lower extremities including hips and knees which is good been going on for years. She has a history of osteoarthritis. She denies any recent fall trauma or injury. She typically takes Ultram 50 mg 3 times daily as needed for the pain. She states that usually take no more than 2/day and that relieves the pain. She states that she ran out of her medication approximately 1 week ago and that today the pain was so bad that she could not bear it. She denies any recent fall, trauma, or injury. No fever or chills. No chest pain or shortness of breath. No groin thigh or calf pain. She denies any chest pain shortness of breath or abdominal pain. No nausea or vomiting. No body aches. She states that her physician whom she saw for a long time recently retired. She has a new physician that is prescribing her medication. Her last prescription was filled on April 28 for 90 tablets of Ultram 50 mg. She ran out of medication approximately 10 days ago. She states that she has been taking Tylenol for the pain without relief.   She called to get a refill of her medication but her new physician is on vacation and will not be able to see them until next week.    She requests a temporary refill of medication. She denies any other physical complaints. She currently rates her knee pain a 7/10 intensity bilaterally. Pain is symmetrical.    Nursing Notes were reviewed. HPI        REVIEW OF SYSTEMS    (2-9 systems for level 4, 10 or more for level 5)       Constitutional: Negative for fever or chills. HENT: Negative for rhinorrhea and sore throat. Eyes: Negative for redness or drainage. Respiratory: Negative for shortness of breath or dyspnea on exertion. Cardiovascular: Negative for chest pain. Gastrointestinal: Negative for abdominal pain. Negative for vomiting or diarrhea. Genitourinary: Negative for flank pain. Negative for dysuria. Negative for hematuria. Neurological: Negative for headache. Musculoskeletal:  Negative edema. Hematological: Negative for adenopathy. All systems are reviewed and are negative except for those listed above in the history of present illness and ROS.         PAST MEDICAL HISTORY     Past Medical History:   Diagnosis Date    CARROLL positive     Arthritis     Bleeding stomach ulcer     Diverticulitis     GERD (gastroesophageal reflux disease)     Hypertension     Stroke ([facility] [address] 2002    left weakness     Tobacco abuse          SURGICAL HISTORY       Past Surgical History:   Procedure Laterality Date    CHOLECYSTECTOMY  2014    COLONOSCOPY  2011    COLONOSCOPY N/A 12/31/2019    COLONOSCOPY DIAGNOSTIC performed by Jack Dutton DO at [facility]  COLONOSCOPY N/A 12/10/2021    COLONOSCOPY DIAGNOSTIC performed by Jaylen Mack MD at [facility], TOTAL ABDOMINAL  1970's    JACQUELYN/BSO for 'tumor'         [address]       Current Discharge Medication List      CONTINUE these medications which have NOT CHANGED    Details   magnesium oxide (MAG-OX) 400 (240 Mg) MG tablet Take 1 tablet by mouth Daily with lunch  Qty: 30 tablet, Refills: 0 ferrous sulfate (FE TABS) 325 (65 Fe) MG EC tablet Take 1 tablet by mouth daily (with breakfast)  Qty: 90 tablet, Refills: 3      lisinopril (PRINIVIL;ZESTRIL) 20 MG tablet Take 20 mg by mouth daily      omeprazole (PRILOSEC) 40 MG delayed release capsule Take 40 mg by mouth daily      melatonin 3 MG TABS tablet Take 1 tablet by mouth nightly as needed (Sleep)  Qty: 30 tablet, Refills: 3      docusate sodium (COLACE) 100 MG capsule Take 100 mg by mouth 2 times daily as needed for Constipation       calcium-vitamin D (OSCAL-500) 500-200 MG-UNIT per tablet Take 1 tablet by mouth 2 times daily      traZODone (DESYREL) 50 MG tablet Take 50 mg by mouth nightly as needed for Sleep      oxybutynin (DITROPAN) 5 MG tablet Take 5 mg by mouth 2 times daily       simvastatin (ZOCOR) 40 MG tablet Take 40 mg by mouth nightly      NIFEdipine (ADALAT CC) 30 MG extended release tablet Take 1 tablet by mouth daily  Qty: 30 tablet, Refills: 0             ALLERGIES     Pcn [penicillins]    FAMILY HISTORY       Family History   Problem Relation Age of Onset    High Blood Pressure Mother     High Blood Pressure Father     High Blood Pressure Daughter     Cancer Son           SOCIAL HISTORY       Social History     Socioeconomic History    Marital status:       Spouse name: Not on file    Number of children: Not on file    Years of education: Not on file    Highest education level: Not on file   Occupational History    Not on file   Tobacco Use    Smoking status: Current Every Day Smoker     Packs/day: 0.25     Years: 50.00     Pack years: 12.50     Types: Cigarettes    Smokeless tobacco: Never Used   Vaping Use    Vaping Use: Never used   Substance and Sexual Activity    Alcohol use: No    Drug use: No    Sexual activity: Never   Other Topics Concern    Not on file   Social History Narrative    Resides in an apartment with her son    Uses a cane for ambulation     Social Determinants of Health     Financial Resource Strain:     Difficulty of Paying Living Expenses: Not on file   Food Insecurity:     Worried About Running Out of Food in the Last Year: Not on file    Emily of Food in the Last Year: Not on file   Transportation Needs:     Lack of Transportation (Medical): Not on file    Lack of Transportation (Non-Medical): Not on file   Physical Activity:     Days of Exercise per Week: Not on file    Minutes of Exercise per Session: Not on file   Stress:     Feeling of Stress : Not on file   Social Connections:     Frequency of Communication with Friends and Family: Not on file    Frequency of Social Gatherings with Friends and Family: Not on file    Attends Holiness Services: Not on file    Active Member of [address] [place] [address] [employer] or Organizations: Not on file    Attends Club or Organization Meetings: Not on file    Marital Status: Not on file   Intimate Partner Violence:     Fear of Current or Ex-Partner: Not on file    Emotionally Abused: Not on file    Physically Abused: Not on file    Sexually Abused: Not on file   Housing Stability:     Unable to Pay for Housing in the Last Year: Not on file    Number of [place] in the Last Year: Not on file    Unstable Housing in the Last Year: Not on file       SCREENINGS    [place] Coma Scale  Eye Opening: Spontaneous  Best Verbal Response: Oriented  Best Motor Response: Obeys commands  [place] Coma Scale Score: 15        PHYSICAL EXAM    (up to 7 for level 4, 8 or more for level 5)     ED Triage Vitals [06/04/22 1451]   BP Temp Temp Source Heart Rate Resp SpO2 Height Weight   (!) 102/91 97.4 °F (36.3 °C) Oral 77 17 97 % 5' 3\" (1.6 m) 117 lb 9.6 oz (53.3 kg)         Physical Exam   Constitutional: Awake and alert. Very pleasant. Mild discomfort. Head: No visible evidence of trauma. Normocephalic. Eyes: Pupils equal and reactive. No photophobia. Conjunctiva normal.    HENT: Oral mucosa moist.  Airway patent. Pharynx without erythema. Nares were clear.   Neck: Soft and supple. Nontender. Heart:  Regular rate and rhythm. No murmur. Lungs:  Clear to auscultation. No wheezes, rales, or ronchi. No conversational dyspnea or accessory muscle use. Chest: Chest wall non-tender. No evidence of trauma. Abdomen:  Soft, nondistended, bowel sounds present. Nontender. No guarding rigidity or rebound. No masses. Musculoskeletal: Upper extremities were nontender with forage motion. She had mild pain with range of motion in both hips and both knees. There is mild tenderness to both knees. No joint effusion. She was able to lift both lower extremities off the bed without difficulty and flex and extend her hips and knees. No erythema. No rash. No groin or thigh or calf tenderness. Dorsalis pedis and posterior tibial pulses were strong and equal bilaterally. Capillary refill less than 2 seconds in all digits. Chronic deformities of the toes bilaterally with nail hypertrophy. No open wounds or infection. Feet are nontender. No calf tenderness erythema or edema. Neurological: Alert and oriented x 3. Speech clear. Cranial nerves II-XII intact. No facial droop. No acute focal motor or sensory deficits. Skin: Skin is warm and dry. No rash. Lymphatic:  No lympadenopathy. Psychiatric: Normal mood and affect.  Behavior is normal.         DIAGNOSTIC RESULTS     EKG: All EKG's are interpreted by the Emergency Department Physician who either signs or Co-signs this chart in the absence of a cardiologist.        RADIOLOGY:   Non-plain film images such as CT, Ultrasound and MRI are read by the radiologist. Plain radiographic images are visualized and preliminarily interpreted by the emergency physician with the below findings:        Interpretation per the Radiologist below, if available at the time of this note:    XR KNEE LEFT (1-2 VIEWS)   Final Result   Soft tissue swelling without acute osseous abnormality         XR KNEE RIGHT (1-2 VIEWS)   Final Result   No acute osseous abnormality               ED BEDSIDE ULTRASOUND:   Performed by ED Physician - none    LABS:  Labs Reviewed - No data to display    All other labs were within normal range or not returned as of this dictation. EMERGENCY DEPARTMENT COURSE and DIFFERENTIAL DIAGNOSIS/MDM:   Vitals:    Vitals:    06/04/22 1451 06/04/22 1500   BP: (!) 102/91 129/72   Pulse: 77 74   Resp: 17 15   Temp: 97.4 °F (36.3 °C)    TempSrc: Oral    SpO2: 97%    Weight: 117 lb 9.6 oz (53.3 kg)    Height: 5' 3\" (1.6 m)          MDM      Patient presents with bilateral knee pain which is identical to what she is experienced with her chronic pain in her joints. This is typically been relieved with Ultram in the past but she is currently out of her Ultram and was unable to get a refill because her physician is on vacation. She is hemodynamically stable. She is afebrile. X-rays were obtained of both knees. She was given tramadol 50 mg p.o. He normally ambulates with a walker and states that she was able to use the walker today. REASSESSMENT          Will given a brief supply of tramadol, number 10 tablets, 1 tablet 3 times daily as needed. This is a long-term medication for the patient which she has tolerated well without any side effects. Benefit outweighs risk. She was advised to use the pain medication sparingly and only if absolutely necessary. I advised her to follow-up with her primary care physician in 1 to 2 days for reexamination. If her condition worsens or new symptoms develop, she was advised to return immediately to the emergency department. CRITICAL CARE TIME   Total Critical Care time was 0 minutes, excluding separately reportable procedures. There was a high probability of clinically significant/life threatening deterioration in the patient's condition which required my urgent intervention.       CONSULTS:  None    PROCEDURES:  Unless otherwise noted below, none     Procedures        FINAL IMPRESSION 1. Chronic pain of both knees    2. Encounter for medication refill    3. Osteoarthritis of both knees, unspecified osteoarthritis type          DISPOSITION/PLAN   DISPOSITION        PATIENT REFERRED TO:  Larene Sandhoff    Call today        DISCHARGE MEDICATIONS:  Current Discharge Medication List        Controlled Substances Monitoring:     RX Monitoring 6/4/2022   Periodic Controlled Substance Monitoring Possible medication side effects, risk of tolerance/dependence & alternative treatments discussed. ;No signs of potential drug abuse or diversion identified. (Please note that portions of this note were completed with a voice recognition program.  Efforts were made to edit the dictations but occasionally words are mis-transcribed. )    Tonya Blanca DO (electronically signed)  Attending Emergency Physician          Jesus Hill DO  06/04/22 2282

## (undated) DEVICE — ENDOSCOPY KIT: Brand: MEDLINE INDUSTRIES, INC.